# Patient Record
Sex: FEMALE | Race: WHITE | Employment: OTHER | URBAN - METROPOLITAN AREA
[De-identification: names, ages, dates, MRNs, and addresses within clinical notes are randomized per-mention and may not be internally consistent; named-entity substitution may affect disease eponyms.]

---

## 2017-08-23 PROBLEM — M54.2 NECK PAIN: Status: ACTIVE | Noted: 2017-08-23

## 2019-02-15 DIAGNOSIS — M54.2 NECK PAIN: Primary | ICD-10-CM

## 2019-03-01 ENCOUNTER — HOSPITAL ENCOUNTER (OUTPATIENT)
Dept: GENERAL RADIOLOGY | Age: 69
Discharge: HOME | End: 2019-03-03
Payer: MEDICARE

## 2019-03-01 ENCOUNTER — HOSPITAL ENCOUNTER (OUTPATIENT)
Age: 69
Discharge: HOME | End: 2019-03-03
Payer: MEDICARE

## 2019-03-01 ENCOUNTER — OFFICE VISIT (OUTPATIENT)
Dept: NEUROSURGERY | Age: 69
End: 2019-03-01
Payer: MEDICARE

## 2019-03-01 DIAGNOSIS — M54.2 NECK PAIN: Primary | ICD-10-CM

## 2019-03-01 DIAGNOSIS — M54.2 NECK PAIN: ICD-10-CM

## 2019-03-01 PROCEDURE — 1123F ACP DISCUSS/DSCN MKR DOCD: CPT | Performed by: PHYSICIAN ASSISTANT

## 2019-03-01 PROCEDURE — 1101F PT FALLS ASSESS-DOCD LE1/YR: CPT | Performed by: PHYSICIAN ASSISTANT

## 2019-03-01 PROCEDURE — G8428 CUR MEDS NOT DOCUMENT: HCPCS | Performed by: PHYSICIAN ASSISTANT

## 2019-03-01 PROCEDURE — 1036F TOBACCO NON-USER: CPT | Performed by: PHYSICIAN ASSISTANT

## 2019-03-01 PROCEDURE — G8484 FLU IMMUNIZE NO ADMIN: HCPCS | Performed by: PHYSICIAN ASSISTANT

## 2019-03-01 PROCEDURE — 72040 X-RAY EXAM NECK SPINE 2-3 VW: CPT

## 2019-03-01 PROCEDURE — G8421 BMI NOT CALCULATED: HCPCS | Performed by: PHYSICIAN ASSISTANT

## 2019-03-01 PROCEDURE — G8400 PT W/DXA NO RESULTS DOC: HCPCS | Performed by: PHYSICIAN ASSISTANT

## 2019-03-01 PROCEDURE — 99213 OFFICE O/P EST LOW 20 MIN: CPT | Performed by: PHYSICIAN ASSISTANT

## 2019-03-01 PROCEDURE — 1090F PRES/ABSN URINE INCON ASSESS: CPT | Performed by: PHYSICIAN ASSISTANT

## 2019-03-01 PROCEDURE — 3017F COLORECTAL CA SCREEN DOC REV: CPT | Performed by: PHYSICIAN ASSISTANT

## 2019-03-01 PROCEDURE — 4040F PNEUMOC VAC/ADMIN/RCVD: CPT | Performed by: PHYSICIAN ASSISTANT

## 2019-03-13 ENCOUNTER — HOSPITAL ENCOUNTER (OUTPATIENT)
Dept: MRI IMAGING | Age: 69
Discharge: HOME | End: 2019-03-15
Payer: MEDICARE

## 2019-03-13 DIAGNOSIS — M54.2 NECK PAIN: ICD-10-CM

## 2019-03-13 PROCEDURE — 72141 MRI NECK SPINE W/O DYE: CPT

## 2019-04-23 ENCOUNTER — OFFICE VISIT (OUTPATIENT)
Dept: NEUROSURGERY | Age: 69
End: 2019-04-23
Payer: MEDICARE

## 2019-04-23 VITALS
HEART RATE: 81 BPM | BODY MASS INDEX: 32.1 KG/M2 | DIASTOLIC BLOOD PRESSURE: 65 MMHG | SYSTOLIC BLOOD PRESSURE: 131 MMHG | WEIGHT: 188 LBS | HEIGHT: 64 IN

## 2019-04-23 DIAGNOSIS — M54.2 NECK PAIN: Primary | ICD-10-CM

## 2019-04-23 PROCEDURE — 99213 OFFICE O/P EST LOW 20 MIN: CPT | Performed by: NEUROLOGICAL SURGERY

## 2019-04-23 PROCEDURE — G8427 DOCREV CUR MEDS BY ELIG CLIN: HCPCS | Performed by: NEUROLOGICAL SURGERY

## 2019-04-23 PROCEDURE — 4040F PNEUMOC VAC/ADMIN/RCVD: CPT | Performed by: NEUROLOGICAL SURGERY

## 2019-04-23 PROCEDURE — 1090F PRES/ABSN URINE INCON ASSESS: CPT | Performed by: NEUROLOGICAL SURGERY

## 2019-04-23 PROCEDURE — G8417 CALC BMI ABV UP PARAM F/U: HCPCS | Performed by: NEUROLOGICAL SURGERY

## 2019-04-23 PROCEDURE — 1123F ACP DISCUSS/DSCN MKR DOCD: CPT | Performed by: NEUROLOGICAL SURGERY

## 2019-04-23 PROCEDURE — 3017F COLORECTAL CA SCREEN DOC REV: CPT | Performed by: NEUROLOGICAL SURGERY

## 2019-04-23 PROCEDURE — G8400 PT W/DXA NO RESULTS DOC: HCPCS | Performed by: NEUROLOGICAL SURGERY

## 2019-04-23 PROCEDURE — 1036F TOBACCO NON-USER: CPT | Performed by: NEUROLOGICAL SURGERY

## 2019-04-23 NOTE — PROGRESS NOTES
Patient is here for follow up consult for: neck pain. She has failed physical therapy and has myelopathy    Physical exam  Alert and Oriented X3  PERRLA, EOMI  ARAUJO 5/5  Sensation intact to LT and PP  Reflexes are 3+ and symmetric  Positive Lucia's sign bilaterally      A/P: patient is here for follow up for: pain. She has cervical stenosis from C3 to C6.  She has myelopathy with a positive Lucia's sign and I am recommending a posterior C3-C6 laminectomy and fusion    Marcelino Willa

## 2019-05-07 ENCOUNTER — PREP FOR PROCEDURE (OUTPATIENT)
Dept: NEUROSURGERY | Age: 69
End: 2019-05-07

## 2019-05-07 DIAGNOSIS — M48.02 CERVICAL STENOSIS OF SPINAL CANAL: Primary | ICD-10-CM

## 2019-05-07 RX ORDER — SODIUM CHLORIDE 0.9 % (FLUSH) 0.9 %
10 SYRINGE (ML) INJECTION EVERY 12 HOURS SCHEDULED
Status: CANCELLED | OUTPATIENT
Start: 2019-05-07

## 2019-05-07 RX ORDER — SODIUM CHLORIDE 0.9 % (FLUSH) 0.9 %
10 SYRINGE (ML) INJECTION PRN
Status: CANCELLED | OUTPATIENT
Start: 2019-05-07

## 2019-05-07 RX ORDER — SODIUM CHLORIDE 9 MG/ML
INJECTION, SOLUTION INTRAVENOUS CONTINUOUS
Status: CANCELLED | OUTPATIENT
Start: 2019-05-07

## 2019-06-14 RX ORDER — MULTIVIT-MIN/IRON/FOLIC ACID/K 18-600-40
CAPSULE ORAL
Status: ON HOLD | COMMUNITY
End: 2019-08-09 | Stop reason: HOSPADM

## 2019-06-14 NOTE — PROGRESS NOTES
pre-op area if you have concerns about your blood sugar [phone]. [x] Use your inhalers the morning of surgery. Bring your emergency inhaler with you day of surgery. [x] Follow physician instructions regarding any blood thinners you may be taking. WHAT TO EXPECT:  [x] The day of surgery you will be greeted and checked in by the [employer].  In addition, you will be registered in the Institute by a Patient Access Representative. Please bring your photo ID and insurance card. A nurse will greet you in accordance to the time you are needed in the pre-op area to prepare you for surgery. Please do not be discouraged if you are not greeted in the order you arrive as there are many variables that are involved in patient preparation. Your patience is greatly appreciated as you wait for your nurse. Please bring in items such as: books, magazines, newspapers, electronics, or any other items  to occupy your time in the waiting area. [x]  Delays may occur with surgery and staff will make a sincere effort to keep you informed of delays. If any delays occur with your procedure, we apologize ahead of time for your inconvenience as we recognize the value of your time.

## 2019-06-20 NOTE — PROGRESS NOTES
Unable to reach pt by phone to review surgical procedure/post-op expectations. Pre-op education materials provided by staff during PAT session and immediate questions to be answered at that time. VM left on pts pone with contact #. Will follow post-op.     Jaime Plascencia, RN, BSN  [facility]

## 2019-06-21 ENCOUNTER — HOSPITAL ENCOUNTER (OUTPATIENT)
Dept: GENERAL RADIOLOGY | Age: 69
Discharge: HOME | End: 2019-06-23
Payer: MEDICARE

## 2019-06-21 ENCOUNTER — HOSPITAL ENCOUNTER (OUTPATIENT)
Dept: PREADMISSION TESTING | Age: 69
Discharge: HOME | End: 2019-06-21
Payer: MEDICARE

## 2019-06-21 ENCOUNTER — ANESTHESIA EVENT (OUTPATIENT)
Dept: OPERATING ROOM | Age: 69
DRG: 473 | End: 2019-06-21
Payer: MEDICARE

## 2019-06-21 VITALS
RESPIRATION RATE: 20 BRPM | OXYGEN SATURATION: 98 % | HEIGHT: 65 IN | BODY MASS INDEX: 29.99 KG/M2 | TEMPERATURE: 98.2 F | HEART RATE: 73 BPM | WEIGHT: 180 LBS

## 2019-06-21 DIAGNOSIS — Z01.812 PRE-OPERATIVE LABORATORY EXAMINATION: ICD-10-CM

## 2019-06-21 DIAGNOSIS — M48.02 CERVICAL STENOSIS OF SPINAL CANAL: ICD-10-CM

## 2019-06-21 LAB
ABO/RH: NORMAL
ANION GAP SERPL CALCULATED.3IONS-SCNC: 9 MMOL/L (ref 7–16)
ANTIBODY SCREEN: NORMAL
BACTERIA: NORMAL /HPF
BASOPHILS ABSOLUTE: 0.07 E9/L (ref 0–0.2)
BASOPHILS RELATIVE PERCENT: 0.9 % (ref 0–2)
BILIRUBIN URINE: NEGATIVE
BLOOD, URINE: ABNORMAL
BUN BLDV-MCNC: 13 MG/DL (ref 8–23)
CALCIUM SERPL-MCNC: 9.4 MG/DL (ref 8.6–10.2)
CHLORIDE BLD-SCNC: 103 MMOL/L (ref 98–107)
CLARITY: CLEAR
CO2: 28 MMOL/L (ref 22–29)
COLOR: YELLOW
CREAT SERPL-MCNC: 0.6 MG/DL (ref 0.5–1)
EKG ATRIAL RATE: 73 BPM
EKG P AXIS: -20 DEGREES
EKG P-R INTERVAL: 146 MS
EKG Q-T INTERVAL: 386 MS
EKG QRS DURATION: 86 MS
EKG QTC CALCULATION (BAZETT): 425 MS
EKG R AXIS: 49 DEGREES
EKG T AXIS: 47 DEGREES
EKG VENTRICULAR RATE: 73 BPM
EOSINOPHILS ABSOLUTE: 0.35 E9/L (ref 0.05–0.5)
EOSINOPHILS RELATIVE PERCENT: 4.6 % (ref 0–6)
GFR AFRICAN AMERICAN: >60
GFR NON-AFRICAN AMERICAN: >60 ML/MIN/1.73
GLUCOSE BLD-MCNC: 120 MG/DL (ref 74–99)
GLUCOSE URINE: NEGATIVE MG/DL
HCT VFR BLD CALC: 41 % (ref 34–48)
HEMOGLOBIN: 13.3 G/DL (ref 11.5–15.5)
IMMATURE GRANULOCYTES #: 0.02 E9/L
IMMATURE GRANULOCYTES %: 0.3 % (ref 0–5)
INR BLD: 1
KETONES, URINE: NEGATIVE MG/DL
LEUKOCYTE ESTERASE, URINE: ABNORMAL
LYMPHOCYTES ABSOLUTE: 3.11 E9/L (ref 1.5–4)
LYMPHOCYTES RELATIVE PERCENT: 40.5 % (ref 20–42)
MCH RBC QN AUTO: 29.1 PG (ref 26–35)
MCHC RBC AUTO-ENTMCNC: 32.4 % (ref 32–34.5)
MCV RBC AUTO: 89.7 FL (ref 80–99.9)
MONOCYTES ABSOLUTE: 0.57 E9/L (ref 0.1–0.95)
MONOCYTES RELATIVE PERCENT: 7.4 % (ref 2–12)
NEUTROPHILS ABSOLUTE: 3.55 E9/L (ref 1.8–7.3)
NEUTROPHILS RELATIVE PERCENT: 46.3 % (ref 43–80)
NITRITE, URINE: NEGATIVE
PDW BLD-RTO: 13.6 FL (ref 11.5–15)
PH UA: 6 (ref 5–9)
PLATELET # BLD: 218 E9/L (ref 130–450)
PMV BLD AUTO: 10.1 FL (ref 7–12)
POTASSIUM REFLEX MAGNESIUM: 4.2 MMOL/L (ref 3.5–5)
PROTEIN UA: NEGATIVE MG/DL
PROTHROMBIN TIME: 11.2 SEC (ref 9.3–12.4)
RBC # BLD: 4.57 E12/L (ref 3.5–5.5)
RBC UA: NORMAL /HPF (ref 0–2)
RENAL EPITHELIAL, UA: NORMAL /HPF
SODIUM BLD-SCNC: 140 MMOL/L (ref 132–146)
SPECIFIC GRAVITY UA: 1.02 (ref 1–1.03)
UROBILINOGEN, URINE: 0.2 E.U./DL
WBC # BLD: 7.7 E9/L (ref 4.5–11.5)
WBC UA: NORMAL /HPF (ref 0–5)

## 2019-06-21 PROCEDURE — 86850 RBC ANTIBODY SCREEN: CPT

## 2019-06-21 PROCEDURE — 71046 X-RAY EXAM CHEST 2 VIEWS: CPT

## 2019-06-21 PROCEDURE — 87088 URINE BACTERIA CULTURE: CPT

## 2019-06-21 PROCEDURE — 36415 COLL VENOUS BLD VENIPUNCTURE: CPT

## 2019-06-21 PROCEDURE — 87081 CULTURE SCREEN ONLY: CPT

## 2019-06-21 PROCEDURE — 85025 COMPLETE CBC W/AUTO DIFF WBC: CPT

## 2019-06-21 PROCEDURE — 80048 BASIC METABOLIC PNL TOTAL CA: CPT

## 2019-06-21 PROCEDURE — 85610 PROTHROMBIN TIME: CPT

## 2019-06-21 PROCEDURE — 93010 ELECTROCARDIOGRAM REPORT: CPT | Performed by: INTERNAL MEDICINE

## 2019-06-21 PROCEDURE — 93005 ELECTROCARDIOGRAM TRACING: CPT | Performed by: PHYSICIAN ASSISTANT

## 2019-06-21 PROCEDURE — 86900 BLOOD TYPING SEROLOGIC ABO: CPT

## 2019-06-21 PROCEDURE — 81001 URINALYSIS AUTO W/SCOPE: CPT

## 2019-06-21 PROCEDURE — 86901 BLOOD TYPING SEROLOGIC RH(D): CPT

## 2019-06-21 ASSESSMENT — PAIN DESCRIPTION - LOCATION: LOCATION: SHOULDER;NECK

## 2019-06-21 ASSESSMENT — PAIN DESCRIPTION - FREQUENCY: FREQUENCY: CONTINUOUS

## 2019-06-21 ASSESSMENT — PAIN DESCRIPTION - PAIN TYPE: TYPE: CHRONIC PAIN

## 2019-06-21 ASSESSMENT — PAIN SCALES - GENERAL: PAINLEVEL_OUTOF10: 8

## 2019-06-21 ASSESSMENT — PAIN DESCRIPTION - ONSET: ONSET: ON-GOING

## 2019-06-21 ASSESSMENT — PAIN DESCRIPTION - PROGRESSION: CLINICAL_PROGRESSION: GRADUALLY WORSENING

## 2019-06-21 ASSESSMENT — PAIN DESCRIPTION - DESCRIPTORS: DESCRIPTORS: ACHING;SHARP;SHOOTING;NUMBNESS;TINGLING

## 2019-06-21 NOTE — ANESTHESIA PRE PROCEDURE
Department of Anesthesiology  Preprocedure Note       Name:  Payton Ellis   Age:  77 y.o.  :  1950                                          MRN:  [id number]         Date:  2019      Surgeon: Adeola Tovar):  Samantha Rodriguez MD    Procedure: POSTERIOR C3-C6 LAMINECTOMY AND FUSION -- NEEDS PLATES, SCREWS, CRISTÓBAL TABLE, CELL SAVER - GLOBUS (N/A )    Medications prior to admission:   Prior to Admission medications    Medication Sig Start Date End Date Taking? Authorizing Provider   Cholecalciferol (VITAMIN D) 2000 units CAPS capsule Take by mouth   Yes Historical Provider, MD   escitalopram (LEXAPRO) 10 MG tablet Take 20 mg by mouth daily    Yes Historical Provider, MD   amLODIPine (NORVASC) 5 MG tablet Take 5 mg by mouth daily   Yes Historical Provider, MD   Naproxen Sodium (ALEVE) 220 MG CAPS Take by mouth   Yes Historical Provider, MD   Multiple Vitamins-Minerals (THERAPEUTIC MULTIVITAMIN-MINERALS) tablet Take 1 tablet by mouth daily   Yes Historical Provider, MD   fluticasone (FLONASE) 50 MCG/ACT nasal spray 1 spray by Nasal route daily    Historical Provider, MD   vitamin B-12 (CYANOCOBALAMIN) 100 MCG tablet Take 50 mcg by mouth daily    Historical Provider, MD       Current medications:    Current Facility-Administered Medications   Medication Dose Route Frequency Provider Last Rate Last Dose    0.9 % sodium chloride infusion   Intravenous Continuous DRAKE Carmen        ceFAZolin (ANCEF) 2 g in dextrose 5 % 50 mL IVPB  2 g Intravenous [facility]        sodium chloride flush 0.9 % injection 10 mL  10 mL Intravenous 2 times per day DRAKE Carmen        sodium chloride flush 0.9 % injection 10 mL  10 mL Intravenous PRN DRAKE Carmen           Allergies:     Allergies   Allergen Reactions    Sulfa Antibiotics      As a child       Problem List:    Patient Active Problem List   Diagnosis Code    Neck pain M54.2    Cervical stenosis of spinal canal M48.02 Past Medical History:        Diagnosis Date    Anxiety     Depression     Osteoarthritis        Past Surgical History:        Procedure Laterality Date    CYST REMOVAL      ovary    JOINT REPLACEMENT      right hip    TONSILLECTOMY AND ADENOIDECTOMY      TOTAL HIP ARTHROPLASTY Right 2015       Social History:    Social History     Tobacco Use    Smoking status: Never Smoker    Smokeless tobacco: Never Used   Substance Use Topics    Alcohol use: No                                Counseling given: Not Answered      Vital Signs (Current):   Vitals:    06/14/19 1034 06/27/19 0639   BP:  (!) 170/78   Pulse:  80   Resp:  18   Temp:  98 °F (36.7 °C)   SpO2:  96%   Weight: 180 lb (81.6 kg) 186 lb (84.4 kg)   Height: 5' 4.5\" (1.638 m) 5' 4.5\" (1.638 m)                                              BP Readings from Last 3 Encounters:   06/27/19 (!) 170/78   04/23/19 131/65   01/23/18 (!) 179/85       NPO Status: Time of last liquid consumption: 2330pt instructed NPO after midnight 6/27/2019                         Time of last solid consumption: 2330                        Date of last liquid consumption: 06/26/19                        Date of last solid food consumption: 06/26/19    BMI:   Wt Readings from Last 3 Encounters:   06/27/19 186 lb (84.4 kg)   06/21/19 180 lb (81.6 kg)   04/23/19 188 lb (85.3 kg)     Body mass index is 31.43 kg/m².     CBC:   Lab Results   Component Value Date    WBC 7.7 06/21/2019    RBC 4.57 06/21/2019    HGB 13.3 06/21/2019    HCT 41.0 06/21/2019    MCV 89.7 06/21/2019    RDW 13.6 06/21/2019     06/21/2019       CMP:   Lab Results   Component Value Date     06/21/2019    K 4.2 06/21/2019     06/21/2019    CO2 28 06/21/2019    BUN 13 06/21/2019    CREATININE 0.6 06/21/2019    GFRAA >60 06/21/2019    LABGLOM >60 06/21/2019    GLUCOSE 120 06/21/2019    PROT 5.8 04/28/2015    CALCIUM 9.4 06/21/2019    BILITOT 0.3 04/28/2015    ALKPHOS 76 04/28/2015    AST 26 04/28/2015    ALT 22 04/28/2015       POC Tests: No results for input(s): POCGLU, POCNA, POCK, POCCL, POCBUN, POCHEMO, POCHCT in the last 72 hours. Coags:   Lab Results   Component Value Date    PROTIME 11.2 06/21/2019    INR 1.0 06/21/2019       HCG (If Applicable): No results found for: PREGTESTUR, PREGSERUM, HCG, HCGQUANT     ABGs: No results found for: PHART, PO2ART, SRY7VNB, GFV2SBC, BEART, D1ASOJDZ     Type & Screen (If Applicable):  No results found for: Paralee Lukes [address]     6/21/2019 EKG  EKG 12 lead   Study Date: 06/21/2019   [place] Perking 77 y.o.     Ordering Provider DRAKE Villarreal   Indications Cervical stenosis of spinal canal [M48.02 (ICD-10-CM)]   Result Information     Status: Preliminary result (6/21/2019 08:58) Provider Status: Ordered   Routing History     Priority Sent On From To Message Type    6/21/2019  8:38 AM Sal[place], Mhy Incoming Results From [employer] [facility] Results    6/21/2019  8:38 AM Salinas, Mhy Incoming Results From [employer] [facility] Pool Results   Order Questions     Question Answer Comment   Reason for Exam? Pre-op           PACS Images      Show images for EKG 12 lead   6/21/2019  8:58 AM - Salinas, Mhy Incoming Ekg Results From [facility]     Component Value Ref Range & Units Status Collected Lab   Ventricular Rate 73  BPM Preliminary 06/21/2019  8:53 AM [facility]   Atrial Rate 73  BPM Preliminary 06/21/2019  8:53 AM [facility]   P-R Interval 146  ms Preliminary 06/21/2019  8:53 AM [facility]   QRS Duration 86  ms Preliminary 06/21/2019  8:53 AM [facility]   Q-T Interval 386  ms Preliminary 06/21/2019  8:53 AM [facility]   QTc Calculation (Bazett) 425  ms Preliminary 06/21/2019  8:53 AM [facility]   P Axis -20  degrees Preliminary 06/21/2019  8:53 AM [facility]   R [place] 49  degrees Preliminary 06/21/2019  8:53 AM [facility]   T [place] 47  degrees Preliminary 06/21/2019  8:53 AM [facility]   Testing Performed By     Lab - Abbreviation Name Director Address Valid Date Range   360-[facility] MUSE Unknown Unknown 04/18/16 0721-Present   Narrative   Performed by: [facility]   Normal sinus rhythm  Normal ECG     3/1/2019 XR cervical spine     Mild grade 1 anterolisthesis C3 on C4. There is a well-corticated bony fragment overlying the inferior   endplate of C3 which may represent a remote avulsion fracture of the   osteophyte. Multiple osteophytes are seen along the lower cervical spine. No fracture or foreign body is identified. The disc spaces demonstrate moderate joint space loss primarily at   C1-C2 and C4-C5. Remainder of the cervical spine demonstrates mild   joint space loss    There is mild loss of the vertebral body height. There are areas of lucency noted through the bodies of C4-C5 and C6   which likely represent a lucency between contiguous proliferative   osteophytes at the uncinate process/vertebral articulation   The are severe degenerative changes involving the facets.           Impression   Findings consistent with moderate degenerative disc   disease and degenerative facets disease       The exam has been dictated and signed by Jimena Medel PA-C. Deborah Jacobs MD, Radiologist, have reviewed the images and   report and concur with these findings. 3/13/2019 MRI cervical spine     Findings: No evidence of fracture or subluxation. No suspicious marrow   infiltrative or destructive process is identified. Scattered fatty   marrow replacement changes are noted, some abutting the end plates. Considerable subcutaneous endplate sclerosis is seen about the   narrowed C4-C5 interspace.       Craniocervical junction remains in anatomic alignment. Mild   osteoarthritic changes are noted at the C1-C2 articulation.       At the C2-C3 interspace level, disc space height and configuration are   maintained. Spinal canal remains widely patent.  Posterior elements are   unremarkable for the presence of moderate left-sided only facet   hypertrophy which causes moderate foraminal outlet Cardiovascular:    (+) hypertension:,         Rhythm: regular  Rate: normal           Beta Blocker:  Not on Beta Blocker         Neuro/Psych:   (+) psychiatric history: stable with treatmentdepression/anxiety  (anxiety )             ROS comment: OA of cervical spine  GI/Hepatic/Renal: Neg GI/Hepatic/Renal ROS            Endo/Other:    (+) : arthritis: OA., .                 Abdominal:   (+) obese,         Vascular: negative vascular ROS. Anesthesia Plan      general     ASA 2     (IV to be placed in preop )  Induction: intravenous. BIS and arterial line  MIPS: Postoperative opioids intended, Prophylactic antiemetics administered and Postoperative trial extubation. Anesthetic plan and risks discussed with patient. Use of blood products discussed with patient whom consented to blood products. Plan discussed with CRNA and attending. Magalie Figueroa DO   2019        Department of Anesthesiology  Preprocedure Note       Name:  Mahad Lawrence   Age:  77 y.o.  :  1950                                          MRN:  [id number]         Date:  2019      Surgeon: Rosalie Chacko):  Isatu Rojas MD    Procedure: POSTERIOR C3-C6 LAMINECTOMY AND FUSION -- NEEDS PLATES, SCREWS, CRISTÓBAL TABLE, CELL SAVER - GLOBUS (N/A )    Medications prior to admission:   Prior to Admission medications    Medication Sig Start Date End Date Taking?  Authorizing Provider   Cholecalciferol (VITAMIN D) 2000 units CAPS capsule Take by mouth   Yes Historical Provider, MD   escitalopram (LEXAPRO) 10 MG tablet Take 20 mg by mouth daily    Yes Historical Provider, MD   amLODIPine (NORVASC) 5 MG tablet Take 5 mg by mouth daily   Yes Historical Provider, MD   Naproxen Sodium (ALEVE) 220 MG CAPS Take by mouth   Yes Historical Provider, MD   Multiple Vitamins-Minerals (THERAPEUTIC MULTIVITAMIN-MINERALS) tablet Take 1 tablet by mouth daily   Yes Historical Provider, MD   fluticasone (FLONASE) 50 MCG/ACT nasal spray 1 spray by Nasal route daily    Historical Provider, MD   vitamin B-12 (CYANOCOBALAMIN) 100 MCG tablet Take 50 mcg by mouth daily    Historical Provider, MD       Current medications:    Current Facility-Administered Medications   Medication Dose Route Frequency Provider Last Rate Last Dose    0.9 % sodium chloride infusion   Intravenous Continuous DRAKE Hunter        ceFAZolin (ANCEF) 2 g in dextrose 5 % 50 mL IVPB  2 g Intravenous On Call to Rachelle Taylor, PA        sodium chloride flush 0.9 % injection 10 mL  10 mL Intravenous 2 times per day DRAKE Hunter        sodium chloride flush 0.9 % injection 10 mL  10 mL Intravenous PRN DRAKE Hunter           Allergies: Allergies   Allergen Reactions    Sulfa Antibiotics      As a child       Problem List:    Patient Active Problem List   Diagnosis Code    Neck pain M54.2    Cervical stenosis of spinal canal M48.02       Past Medical History:        Diagnosis Date    Anxiety     Depression     Osteoarthritis        Past Surgical History:        Procedure Laterality Date    CYST REMOVAL      ovary    JOINT REPLACEMENT      right hip    TONSILLECTOMY AND ADENOIDECTOMY      TOTAL HIP ARTHROPLASTY Right 2015       Social History:    Social History     Tobacco Use    Smoking status: Never Smoker    Smokeless tobacco: Never Used   Substance Use Topics    Alcohol use:  No                                Counseling given: Not Answered      Vital Signs (Current):   Vitals:    06/14/19 1034 06/27/19 0639   BP:  (!) 170/78   Pulse:  80   Resp:  18   Temp:  98 °F (36.7 °C)   SpO2:  96%   Weight: 180 lb (81.6 kg) 186 lb (84.4 kg)   Height: 5' 4.5\" (1.638 m) 5' 4.5\" (1.638 m)                                              BP Readings from Last 3 Encounters:   06/27/19 (!) 170/78   04/23/19 131/65   01/23/18 (!) 179/85       NPO Status: Time of last liquid consumption: 2330pt [employer] P Josph Puls Ib Pool Results   Order Questions     Question Answer Comment   Reason for Exam? Pre-op           PACS Images      Show images for EKG 12 lead   6/21/2019  8:58 AM - Salinas, Mhy Incoming Ekg Results From [facility]     Component Value Ref Range & Units Status Collected Lab   Ventricular Rate 73  BPM Preliminary 06/21/2019  8:53 AM [facility]   Atrial Rate 73  BPM Preliminary 06/21/2019  8:53 AM [facility]   P-R Interval 146  ms Preliminary 06/21/2019  8:53 AM [facility]   QRS Duration 86  ms Preliminary 06/21/2019  8:53 AM [facility]   Q-T Interval 386  ms Preliminary 06/21/2019  8:53 AM [facility]   QTc Calculation (Bazett) 425  ms Preliminary 06/21/2019  8:53 AM [facility]   P Axis -20  degrees Preliminary 06/21/2019  8:53 AM [facility]   R [place] 49  degrees Preliminary 06/21/2019  8:53 AM [facility]   T [place] 47  degrees Preliminary 06/21/2019  8:53 AM [facility]   Testing Performed By     Lab - Abbreviation Name Director Address Valid Date Range   360-[facility] MUSE Unknown Unknown 04/18/16 0721-Present   Narrative   Performed by: [facility]   Normal sinus rhythm  Normal ECG     3/1/2019 XR cervical spine     Mild grade 1 anterolisthesis C3 on C4. There is a well-corticated bony fragment overlying the inferior   endplate of C3 which may represent a remote avulsion fracture of the   osteophyte. Multiple osteophytes are seen along the lower cervical spine. No fracture or foreign body is identified. The disc spaces demonstrate moderate joint space loss primarily at   C1-C2 and C4-C5. Remainder of the cervical spine demonstrates mild   joint space loss    There is mild loss of the vertebral body height.    There are areas of lucency noted through the bodies of C4-C5 and C6   which likely represent a lucency between contiguous proliferative   osteophytes at the uncinate process/vertebral articulation   The are severe degenerative changes involving the facets.           Impression   Findings consistent with moderate degenerative disc   disease and degenerative facets disease       The exam has been dictated and signed by Hermann Jackson PA-C. Haley Mtz MD, Radiologist, have reviewed the images and   report and concur with these findings. 3/13/2019 MRI cervical spine     Findings: No evidence of fracture or subluxation. No suspicious marrow   infiltrative or destructive process is identified. Scattered fatty   marrow replacement changes are noted, some abutting the end plates. Considerable subcutaneous endplate sclerosis is seen about the   narrowed C4-C5 interspace.       Craniocervical junction remains in anatomic alignment. Mild   osteoarthritic changes are noted at the C1-C2 articulation.       At the C2-C3 interspace level, disc space height and configuration are   maintained. Spinal canal remains widely patent. Posterior elements are   unremarkable for the presence of moderate left-sided only facet   hypertrophy which causes moderate foraminal outlet narrowing.       At the C3-C4 interspace level minimal generalized posterior disc   bulging ridging changes are seen. Minimal decrease space height is   seen. Spinal canal is slightly compromised. Moderate right-sided   uncinate joint hypertrophy is noted on the left side, combination of   changes of moderate uncinate joint hypertrophy and mild facet   hypertrophy are noted. There is moderate narrowing of the right   foraminal outlet and severe narrowing of the left foramen.       At the C4-C5 interspace level, severe disc space narrowing is noted   along with mild generalized posterior ridging. Mild to moderate   compromise the spinal canal is noted.  Moderate bilateral facet   hypertrophy is noted on the right and moderate to severe hypertrophy   on the left, causing extensive bilateral foraminal outlet narrowing.       At the C5-C6 interspace level, mild bilateral uncinate joint   hypertrophy is seen with mild generalized posterior disc bulging and ridging. There is moderate concurrent high pressure the right facet. There is mild compromise of the spinal canal. The right foramen is   severely narrowed of the left foraminal outlet mildly narrowed.       At the C6-C7 and C7-T1 interspace levels, only minor degenerative   changes are noted. Spinal canal and neural foramina remain widely   patent.       Cervical spinal cord remains normal in caliber. Minimal signal   abnormality compatible with myelinolysis is noted within the cord at   the approximate level of the C5-C6 interspace.       No paraspinous mass lesion is identified.           Impression   Extensive multilevel cervical spondylotic changes as described above.             Anesthesia Evaluation  Patient summary reviewed and Nursing notes reviewed no history of anesthetic complications:   Airway: Mallampati: II  TM distance: >3 FB   Neck ROM: full  Mouth opening: > = 3 FB Dental:          Pulmonary:Negative Pulmonary ROS and normal exam                               Cardiovascular:    (+) hypertension:,         Rhythm: regular  Rate: normal           Beta Blocker:  Not on Beta Blocker         Neuro/Psych:   (+) psychiatric history: stable with treatmentdepression/anxiety  (anxiety )             ROS comment: OA of cervical spine  GI/Hepatic/Renal: Neg GI/Hepatic/Renal ROS            Endo/Other:    (+) : arthritis: OA., .                 Abdominal:           Vascular: negative vascular ROS. Anesthesia Plan      general     ASA 2     (IV to be placed in preop )  Induction: intravenous. BIS and arterial line  MIPS: Postoperative opioids intended, Prophylactic antiemetics administered and Postoperative trial extubation. Anesthetic plan and risks discussed with patient. Use of blood products discussed with patient whom consented to blood products. Plan discussed with CRNA and attending.                   Lynna Sandifer, DO   6/27/2019

## 2019-06-22 LAB — MRSA CULTURE ONLY: NORMAL

## 2019-06-23 LAB — URINE CULTURE, ROUTINE: NORMAL

## 2019-06-27 ENCOUNTER — ANESTHESIA (OUTPATIENT)
Dept: OPERATING ROOM | Age: 69
DRG: 473 | End: 2019-06-27
Payer: MEDICARE

## 2019-06-27 ENCOUNTER — APPOINTMENT (OUTPATIENT)
Dept: GENERAL RADIOLOGY | Age: 69
DRG: 473 | End: 2019-06-27
Attending: NEUROLOGICAL SURGERY
Payer: MEDICARE

## 2019-06-27 ENCOUNTER — HOSPITAL ENCOUNTER (INPATIENT)
Age: 69
LOS: 2 days | Discharge: NURSING, REHAB OR LONG-TERM CARE | DRG: 473 | End: 2019-06-29
Attending: NEUROLOGICAL SURGERY | Admitting: NEUROLOGICAL SURGERY
Payer: MEDICARE

## 2019-06-27 VITALS
OXYGEN SATURATION: 100 % | DIASTOLIC BLOOD PRESSURE: 71 MMHG | SYSTOLIC BLOOD PRESSURE: 129 MMHG | RESPIRATION RATE: 9 BRPM

## 2019-06-27 DIAGNOSIS — M48.02 CERVICAL STENOSIS OF SPINAL CANAL: ICD-10-CM

## 2019-06-27 DIAGNOSIS — Z01.812 PRE-OPERATIVE LABORATORY EXAMINATION: Primary | ICD-10-CM

## 2019-06-27 PROCEDURE — C1713 ANCHOR/SCREW BN/BN,TIS/BN: HCPCS | Performed by: NEUROLOGICAL SURGERY

## 2019-06-27 PROCEDURE — 22600 ARTHRD PST TQ 1NTRSPC CRV: CPT | Performed by: PHYSICIAN ASSISTANT

## 2019-06-27 PROCEDURE — 2580000003 HC RX 258

## 2019-06-27 PROCEDURE — 22842 INSERT SPINE FIXATION DEVICE: CPT | Performed by: NEUROLOGICAL SURGERY

## 2019-06-27 PROCEDURE — 2580000003 HC RX 258: Performed by: NEUROLOGICAL SURGERY

## 2019-06-27 PROCEDURE — 7100000001 HC PACU RECOVERY - ADDTL 15 MIN: Performed by: NEUROLOGICAL SURGERY

## 2019-06-27 PROCEDURE — 22614 ARTHRD PST TQ 1NTRSPC EA ADD: CPT | Performed by: PHYSICIAN ASSISTANT

## 2019-06-27 PROCEDURE — 2500000003 HC RX 250 WO HCPCS: Performed by: NEUROLOGICAL SURGERY

## 2019-06-27 PROCEDURE — 6360000002 HC RX W HCPCS

## 2019-06-27 PROCEDURE — 97162 PT EVAL MOD COMPLEX 30 MIN: CPT

## 2019-06-27 PROCEDURE — 97165 OT EVAL LOW COMPLEX 30 MIN: CPT

## 2019-06-27 PROCEDURE — 6370000000 HC RX 637 (ALT 250 FOR IP): Performed by: NEUROLOGICAL SURGERY

## 2019-06-27 PROCEDURE — 3700000000 HC ANESTHESIA ATTENDED CARE: Performed by: NEUROLOGICAL SURGERY

## 2019-06-27 PROCEDURE — 3700000001 HC ADD 15 MINUTES (ANESTHESIA): Performed by: NEUROLOGICAL SURGERY

## 2019-06-27 PROCEDURE — 1200000000 HC SEMI PRIVATE

## 2019-06-27 PROCEDURE — 6360000002 HC RX W HCPCS: Performed by: NEUROLOGICAL SURGERY

## 2019-06-27 PROCEDURE — 63015 REMOVE SPINE LAMINA >2 CRVCL: CPT | Performed by: PHYSICIAN ASSISTANT

## 2019-06-27 PROCEDURE — 7100000000 HC PACU RECOVERY - FIRST 15 MIN: Performed by: NEUROLOGICAL SURGERY

## 2019-06-27 PROCEDURE — 6360000002 HC RX W HCPCS: Performed by: ANESTHESIOLOGY

## 2019-06-27 PROCEDURE — 22842 INSERT SPINE FIXATION DEVICE: CPT | Performed by: PHYSICIAN ASSISTANT

## 2019-06-27 PROCEDURE — 3600000014 HC SURGERY LEVEL 4 ADDTL 15MIN: Performed by: NEUROLOGICAL SURGERY

## 2019-06-27 PROCEDURE — 2700000000 HC OXYGEN THERAPY PER DAY

## 2019-06-27 PROCEDURE — 97530 THERAPEUTIC ACTIVITIES: CPT

## 2019-06-27 PROCEDURE — 0RG2071 FUSION OF 2 OR MORE CERVICAL VERTEBRAL JOINTS WITH AUTOLOGOUS TISSUE SUBSTITUTE, POSTERIOR APPROACH, POSTERIOR COLUMN, OPEN APPROACH: ICD-10-PCS | Performed by: NEUROLOGICAL SURGERY

## 2019-06-27 PROCEDURE — 22614 ARTHRD PST TQ 1NTRSPC EA ADD: CPT | Performed by: NEUROLOGICAL SURGERY

## 2019-06-27 PROCEDURE — 63015 REMOVE SPINE LAMINA >2 CRVCL: CPT | Performed by: NEUROLOGICAL SURGERY

## 2019-06-27 PROCEDURE — 2709999900 HC NON-CHARGEABLE SUPPLY: Performed by: NEUROLOGICAL SURGERY

## 2019-06-27 PROCEDURE — 6360000002 HC RX W HCPCS: Performed by: PHYSICIAN ASSISTANT

## 2019-06-27 PROCEDURE — 22600 ARTHRD PST TQ 1NTRSPC CRV: CPT | Performed by: NEUROLOGICAL SURGERY

## 2019-06-27 PROCEDURE — 2500000003 HC RX 250 WO HCPCS

## 2019-06-27 PROCEDURE — 3209999900 FLUORO FOR SURGICAL PROCEDURES

## 2019-06-27 PROCEDURE — 3600000004 HC SURGERY LEVEL 4 BASE: Performed by: NEUROLOGICAL SURGERY

## 2019-06-27 PROCEDURE — C9359 IMPLNT,BON VOID FILLER-PUTTY: HCPCS | Performed by: NEUROLOGICAL SURGERY

## 2019-06-27 DEVICE — 3.5MM CAPITOL CURVED ROD, 50MM
Type: IMPLANTABLE DEVICE | Site: SPINE CERVICAL | Status: FUNCTIONAL
Brand: CAPITOL

## 2019-06-27 DEVICE — ALLOGRAFT BNE PTTY 5 CC DBM PROGENIX +: Type: IMPLANTABLE DEVICE | Site: SPINE CERVICAL | Status: FUNCTIONAL

## 2019-06-27 DEVICE — QUARTEX THREADED LOCKING CAP
Type: IMPLANTABLE DEVICE | Site: SPINE CERVICAL | Status: FUNCTIONAL
Brand: QUARTEX

## 2019-06-27 DEVICE — QUARTEX 3.5MM POLYAXIAL SCREW, 14MM
Type: IMPLANTABLE DEVICE | Site: SPINE CERVICAL | Status: FUNCTIONAL
Brand: QUARTEX

## 2019-06-27 RX ORDER — SODIUM CHLORIDE 9 MG/ML
INJECTION, SOLUTION INTRAVENOUS CONTINUOUS PRN
Status: DISCONTINUED | OUTPATIENT
Start: 2019-06-27 | End: 2019-06-27 | Stop reason: SDUPTHER

## 2019-06-27 RX ORDER — CYCLOBENZAPRINE HCL 10 MG
10 TABLET ORAL 3 TIMES DAILY PRN
Status: DISCONTINUED | OUTPATIENT
Start: 2019-06-27 | End: 2019-06-29 | Stop reason: HOSPADM

## 2019-06-27 RX ORDER — OXYCODONE HYDROCHLORIDE 5 MG/1
5 TABLET ORAL EVERY 4 HOURS PRN
Status: DISCONTINUED | OUTPATIENT
Start: 2019-06-27 | End: 2019-06-29 | Stop reason: HOSPADM

## 2019-06-27 RX ORDER — LIDOCAINE HYDROCHLORIDE 20 MG/ML
INJECTION, SOLUTION INTRAVENOUS PRN
Status: DISCONTINUED | OUTPATIENT
Start: 2019-06-27 | End: 2019-06-27 | Stop reason: SDUPTHER

## 2019-06-27 RX ORDER — ONDANSETRON 2 MG/ML
4 INJECTION INTRAMUSCULAR; INTRAVENOUS EVERY 6 HOURS PRN
Status: DISCONTINUED | OUTPATIENT
Start: 2019-06-27 | End: 2019-06-29 | Stop reason: HOSPADM

## 2019-06-27 RX ORDER — OXYCODONE HYDROCHLORIDE AND ACETAMINOPHEN 5; 325 MG/1; MG/1
1 TABLET ORAL PRN
Status: DISCONTINUED | OUTPATIENT
Start: 2019-06-27 | End: 2019-06-27 | Stop reason: HOSPADM

## 2019-06-27 RX ORDER — SENNA AND DOCUSATE SODIUM 50; 8.6 MG/1; MG/1
1 TABLET, FILM COATED ORAL 2 TIMES DAILY
Status: DISCONTINUED | OUTPATIENT
Start: 2019-06-27 | End: 2019-06-29 | Stop reason: HOSPADM

## 2019-06-27 RX ORDER — VANCOMYCIN HYDROCHLORIDE 500 MG/10ML
INJECTION, POWDER, LYOPHILIZED, FOR SOLUTION INTRAVENOUS PRN
Status: DISCONTINUED | OUTPATIENT
Start: 2019-06-27 | End: 2019-06-27 | Stop reason: ALTCHOICE

## 2019-06-27 RX ORDER — NEOSTIGMINE METHYLSULFATE 1 MG/ML
INJECTION, SOLUTION INTRAVENOUS PRN
Status: DISCONTINUED | OUTPATIENT
Start: 2019-06-27 | End: 2019-06-27 | Stop reason: SDUPTHER

## 2019-06-27 RX ORDER — MORPHINE SULFATE 4 MG/ML
4 INJECTION, SOLUTION INTRAMUSCULAR; INTRAVENOUS
Status: DISCONTINUED | OUTPATIENT
Start: 2019-06-27 | End: 2019-06-29 | Stop reason: HOSPADM

## 2019-06-27 RX ORDER — SODIUM CHLORIDE, SODIUM LACTATE, POTASSIUM CHLORIDE, CALCIUM CHLORIDE 600; 310; 30; 20 MG/100ML; MG/100ML; MG/100ML; MG/100ML
INJECTION, SOLUTION INTRAVENOUS CONTINUOUS PRN
Status: DISCONTINUED | OUTPATIENT
Start: 2019-06-27 | End: 2019-06-27 | Stop reason: SDUPTHER

## 2019-06-27 RX ORDER — ESCITALOPRAM OXALATE 10 MG/1
20 TABLET ORAL DAILY
Status: DISCONTINUED | OUTPATIENT
Start: 2019-06-28 | End: 2019-06-29 | Stop reason: HOSPADM

## 2019-06-27 RX ORDER — ONDANSETRON 2 MG/ML
INJECTION INTRAMUSCULAR; INTRAVENOUS PRN
Status: DISCONTINUED | OUTPATIENT
Start: 2019-06-27 | End: 2019-06-27 | Stop reason: SDUPTHER

## 2019-06-27 RX ORDER — ONDANSETRON 2 MG/ML
4 INJECTION INTRAMUSCULAR; INTRAVENOUS
Status: DISCONTINUED | OUTPATIENT
Start: 2019-06-27 | End: 2019-06-27 | Stop reason: HOSPADM

## 2019-06-27 RX ORDER — SODIUM CHLORIDE 0.9 % (FLUSH) 0.9 %
10 SYRINGE (ML) INJECTION PRN
Status: DISCONTINUED | OUTPATIENT
Start: 2019-06-27 | End: 2019-06-29 | Stop reason: HOSPADM

## 2019-06-27 RX ORDER — BUPIVACAINE HYDROCHLORIDE AND EPINEPHRINE 2.5; 5 MG/ML; UG/ML
INJECTION, SOLUTION EPIDURAL; INFILTRATION; INTRACAUDAL; PERINEURAL PRN
Status: DISCONTINUED | OUTPATIENT
Start: 2019-06-27 | End: 2019-06-27 | Stop reason: ALTCHOICE

## 2019-06-27 RX ORDER — FENTANYL CITRATE 50 UG/ML
INJECTION, SOLUTION INTRAMUSCULAR; INTRAVENOUS PRN
Status: DISCONTINUED | OUTPATIENT
Start: 2019-06-27 | End: 2019-06-27 | Stop reason: SDUPTHER

## 2019-06-27 RX ORDER — SODIUM PHOSPHATE, DIBASIC AND SODIUM PHOSPHATE, MONOBASIC 7; 19 G/133ML; G/133ML
1 ENEMA RECTAL DAILY PRN
Status: DISCONTINUED | OUTPATIENT
Start: 2019-06-27 | End: 2019-06-29 | Stop reason: HOSPADM

## 2019-06-27 RX ORDER — ROCURONIUM BROMIDE 10 MG/ML
INJECTION, SOLUTION INTRAVENOUS PRN
Status: DISCONTINUED | OUTPATIENT
Start: 2019-06-27 | End: 2019-06-27 | Stop reason: SDUPTHER

## 2019-06-27 RX ORDER — SODIUM CHLORIDE 0.9 % (FLUSH) 0.9 %
10 SYRINGE (ML) INJECTION EVERY 12 HOURS SCHEDULED
Status: DISCONTINUED | OUTPATIENT
Start: 2019-06-27 | End: 2019-06-29 | Stop reason: HOSPADM

## 2019-06-27 RX ORDER — SODIUM CHLORIDE 9 MG/ML
INJECTION, SOLUTION INTRAVENOUS CONTINUOUS
Status: DISCONTINUED | OUTPATIENT
Start: 2019-06-27 | End: 2019-06-27

## 2019-06-27 RX ORDER — MEPERIDINE HYDROCHLORIDE 50 MG/ML
12.5 INJECTION INTRAMUSCULAR; INTRAVENOUS; SUBCUTANEOUS
Status: DISCONTINUED | OUTPATIENT
Start: 2019-06-27 | End: 2019-06-27 | Stop reason: HOSPADM

## 2019-06-27 RX ORDER — MORPHINE SULFATE 2 MG/ML
1 INJECTION, SOLUTION INTRAMUSCULAR; INTRAVENOUS EVERY 5 MIN PRN
Status: DISCONTINUED | OUTPATIENT
Start: 2019-06-27 | End: 2019-06-27 | Stop reason: HOSPADM

## 2019-06-27 RX ORDER — MORPHINE SULFATE 2 MG/ML
2 INJECTION, SOLUTION INTRAMUSCULAR; INTRAVENOUS
Status: DISCONTINUED | OUTPATIENT
Start: 2019-06-27 | End: 2019-06-29 | Stop reason: HOSPADM

## 2019-06-27 RX ORDER — MORPHINE SULFATE 2 MG/ML
2 INJECTION, SOLUTION INTRAMUSCULAR; INTRAVENOUS EVERY 5 MIN PRN
Status: DISCONTINUED | OUTPATIENT
Start: 2019-06-27 | End: 2019-06-27 | Stop reason: HOSPADM

## 2019-06-27 RX ORDER — FLUTICASONE PROPIONATE 50 MCG
1 SPRAY, SUSPENSION (ML) NASAL DAILY
Status: DISCONTINUED | OUTPATIENT
Start: 2019-06-27 | End: 2019-06-29 | Stop reason: HOSPADM

## 2019-06-27 RX ORDER — PROPOFOL 10 MG/ML
INJECTION, EMULSION INTRAVENOUS PRN
Status: DISCONTINUED | OUTPATIENT
Start: 2019-06-27 | End: 2019-06-27 | Stop reason: SDUPTHER

## 2019-06-27 RX ORDER — SODIUM CHLORIDE 0.9 % (FLUSH) 0.9 %
10 SYRINGE (ML) INJECTION PRN
Status: DISCONTINUED | OUTPATIENT
Start: 2019-06-27 | End: 2019-06-27 | Stop reason: HOSPADM

## 2019-06-27 RX ORDER — AMLODIPINE BESYLATE 5 MG/1
5 TABLET ORAL DAILY
Status: DISCONTINUED | OUTPATIENT
Start: 2019-06-28 | End: 2019-06-29 | Stop reason: HOSPADM

## 2019-06-27 RX ORDER — DOCUSATE SODIUM 100 MG/1
100 CAPSULE, LIQUID FILLED ORAL 2 TIMES DAILY
Status: DISCONTINUED | OUTPATIENT
Start: 2019-06-27 | End: 2019-06-29 | Stop reason: HOSPADM

## 2019-06-27 RX ORDER — GLYCOPYRROLATE 1 MG/5 ML
SYRINGE (ML) INTRAVENOUS PRN
Status: DISCONTINUED | OUTPATIENT
Start: 2019-06-27 | End: 2019-06-27 | Stop reason: SDUPTHER

## 2019-06-27 RX ORDER — DEXAMETHASONE SODIUM PHOSPHATE 10 MG/ML
INJECTION INTRAMUSCULAR; INTRAVENOUS PRN
Status: DISCONTINUED | OUTPATIENT
Start: 2019-06-27 | End: 2019-06-27 | Stop reason: SDUPTHER

## 2019-06-27 RX ORDER — PHENYLEPHRINE HYDROCHLORIDE 10 MG/ML
INJECTION INTRAVENOUS PRN
Status: DISCONTINUED | OUTPATIENT
Start: 2019-06-27 | End: 2019-06-27 | Stop reason: SDUPTHER

## 2019-06-27 RX ORDER — MIDAZOLAM HYDROCHLORIDE 1 MG/ML
INJECTION INTRAMUSCULAR; INTRAVENOUS PRN
Status: DISCONTINUED | OUTPATIENT
Start: 2019-06-27 | End: 2019-06-27 | Stop reason: SDUPTHER

## 2019-06-27 RX ORDER — M-VIT,TX,IRON,MINS/CALC/FOLIC 27MG-0.4MG
1 TABLET ORAL DAILY
Status: DISCONTINUED | OUTPATIENT
Start: 2019-06-27 | End: 2019-06-29 | Stop reason: HOSPADM

## 2019-06-27 RX ORDER — OXYCODONE HYDROCHLORIDE AND ACETAMINOPHEN 5; 325 MG/1; MG/1
2 TABLET ORAL PRN
Status: DISCONTINUED | OUTPATIENT
Start: 2019-06-27 | End: 2019-06-27 | Stop reason: HOSPADM

## 2019-06-27 RX ORDER — LIDOCAINE HYDROCHLORIDE AND EPINEPHRINE 5; 5 MG/ML; UG/ML
INJECTION, SOLUTION INFILTRATION; PERINEURAL PRN
Status: DISCONTINUED | OUTPATIENT
Start: 2019-06-27 | End: 2019-06-27 | Stop reason: ALTCHOICE

## 2019-06-27 RX ORDER — OXYCODONE HYDROCHLORIDE 10 MG/1
10 TABLET ORAL EVERY 4 HOURS PRN
Status: DISCONTINUED | OUTPATIENT
Start: 2019-06-27 | End: 2019-06-29 | Stop reason: HOSPADM

## 2019-06-27 RX ORDER — SODIUM CHLORIDE 0.9 % (FLUSH) 0.9 %
10 SYRINGE (ML) INJECTION EVERY 12 HOURS SCHEDULED
Status: DISCONTINUED | OUTPATIENT
Start: 2019-06-27 | End: 2019-06-27 | Stop reason: HOSPADM

## 2019-06-27 RX ADMIN — DEXTROSE MONOHYDRATE 2 G: 50 INJECTION, SOLUTION INTRAVENOUS at 23:35

## 2019-06-27 RX ADMIN — Medication 10 ML: at 23:35

## 2019-06-27 RX ADMIN — OXYCODONE HYDROCHLORIDE 10 MG: 10 TABLET ORAL at 19:20

## 2019-06-27 RX ADMIN — SENNOSIDES,DOCUSATE SODIUM 1 TABLET: 8.6; 5 TABLET, FILM COATED ORAL at 16:11

## 2019-06-27 RX ADMIN — DOCUSATE SODIUM 100 MG: 100 CAPSULE, LIQUID FILLED ORAL at 16:12

## 2019-06-27 RX ADMIN — FLUTICASONE PROPIONATE 1 SPRAY: 50 SPRAY, METERED NASAL at 16:11

## 2019-06-27 RX ADMIN — DEXAMETHASONE SODIUM PHOSPHATE 10 MG: 10 INJECTION INTRAMUSCULAR; INTRAVENOUS at 08:08

## 2019-06-27 RX ADMIN — SODIUM CHLORIDE, POTASSIUM CHLORIDE, SODIUM LACTATE AND CALCIUM CHLORIDE: 600; 310; 30; 20 INJECTION, SOLUTION INTRAVENOUS at 08:03

## 2019-06-27 RX ADMIN — Medication 2 G: at 08:00

## 2019-06-27 RX ADMIN — OXYCODONE HYDROCHLORIDE 5 MG: 5 TABLET ORAL at 14:24

## 2019-06-27 RX ADMIN — ONDANSETRON HYDROCHLORIDE 4 MG: 2 INJECTION, SOLUTION INTRAMUSCULAR; INTRAVENOUS at 09:36

## 2019-06-27 RX ADMIN — PHENYLEPHRINE HYDROCHLORIDE 100 MCG: 10 INJECTION INTRAVENOUS at 08:59

## 2019-06-27 RX ADMIN — CYCLOBENZAPRINE 10 MG: 10 TABLET, FILM COATED ORAL at 14:24

## 2019-06-27 RX ADMIN — MORPHINE SULFATE 2 MG: 2 INJECTION, SOLUTION INTRAMUSCULAR; INTRAVENOUS at 21:59

## 2019-06-27 RX ADMIN — SENNOSIDES,DOCUSATE SODIUM 1 TABLET: 8.6; 5 TABLET, FILM COATED ORAL at 21:52

## 2019-06-27 RX ADMIN — FENTANYL CITRATE 100 MCG: 50 INJECTION, SOLUTION INTRAMUSCULAR; INTRAVENOUS at 07:59

## 2019-06-27 RX ADMIN — Medication 0.6 MG: at 10:16

## 2019-06-27 RX ADMIN — DEXTROSE MONOHYDRATE 2 G: 50 INJECTION, SOLUTION INTRAVENOUS at 16:32

## 2019-06-27 RX ADMIN — CYCLOBENZAPRINE 10 MG: 10 TABLET, FILM COATED ORAL at 21:52

## 2019-06-27 RX ADMIN — MIDAZOLAM HYDROCHLORIDE 2 MG: 1 INJECTION, SOLUTION INTRAMUSCULAR; INTRAVENOUS at 07:53

## 2019-06-27 RX ADMIN — ROCURONIUM BROMIDE 50 MG: 10 INJECTION, SOLUTION INTRAVENOUS at 07:59

## 2019-06-27 RX ADMIN — Medication 10 ML: at 21:00

## 2019-06-27 RX ADMIN — HYDROMORPHONE HYDROCHLORIDE 0.5 MG: 1 INJECTION, SOLUTION INTRAMUSCULAR; INTRAVENOUS; SUBCUTANEOUS at 10:55

## 2019-06-27 RX ADMIN — LIDOCAINE HYDROCHLORIDE 100 MG: 20 INJECTION, SOLUTION INTRAVENOUS at 07:59

## 2019-06-27 RX ADMIN — PROPOFOL 200 MG: 10 INJECTION, EMULSION INTRAVENOUS at 07:59

## 2019-06-27 RX ADMIN — SODIUM CHLORIDE: 9 INJECTION, SOLUTION INTRAVENOUS at 07:53

## 2019-06-27 RX ADMIN — HYDROMORPHONE HYDROCHLORIDE 0.5 MG: 1 INJECTION, SOLUTION INTRAMUSCULAR; INTRAVENOUS; SUBCUTANEOUS at 10:49

## 2019-06-27 RX ADMIN — Medication 3 MG: at 10:16

## 2019-06-27 RX ADMIN — MULTIPLE VITAMINS W/ MINERALS TAB 1 TABLET: TAB at 16:11

## 2019-06-27 RX ADMIN — PHENYLEPHRINE HYDROCHLORIDE 100 MCG: 10 INJECTION INTRAVENOUS at 09:09

## 2019-06-27 RX ADMIN — BISACODYL 5 MG: 5 TABLET, COATED ORAL at 16:11

## 2019-06-27 ASSESSMENT — PULMONARY FUNCTION TESTS
PIF_VALUE: 32
PIF_VALUE: 31
PIF_VALUE: 23
PIF_VALUE: 22
PIF_VALUE: 32
PIF_VALUE: 33
PIF_VALUE: 33
PIF_VALUE: 34
PIF_VALUE: 33
PIF_VALUE: 33
PIF_VALUE: 17
PIF_VALUE: 4
PIF_VALUE: 32
PIF_VALUE: 33
PIF_VALUE: 32
PIF_VALUE: 31
PIF_VALUE: 33
PIF_VALUE: 21
PIF_VALUE: 33
PIF_VALUE: 31
PIF_VALUE: 22
PIF_VALUE: 31
PIF_VALUE: 31
PIF_VALUE: 32
PIF_VALUE: 33
PIF_VALUE: 31
PIF_VALUE: 1
PIF_VALUE: 33
PIF_VALUE: 25
PIF_VALUE: 33
PIF_VALUE: 32
PIF_VALUE: 6
PIF_VALUE: 31
PIF_VALUE: 2
PIF_VALUE: 15
PIF_VALUE: 18
PIF_VALUE: 32
PIF_VALUE: 33
PIF_VALUE: 32
PIF_VALUE: 33
PIF_VALUE: 0
PIF_VALUE: 17
PIF_VALUE: 1
PIF_VALUE: 24
PIF_VALUE: 24
PIF_VALUE: 33
PIF_VALUE: 32
PIF_VALUE: 21
PIF_VALUE: 32
PIF_VALUE: 24
PIF_VALUE: 33
PIF_VALUE: 4
PIF_VALUE: 32
PIF_VALUE: 22
PIF_VALUE: 26
PIF_VALUE: 32
PIF_VALUE: 33
PIF_VALUE: 33
PIF_VALUE: 4
PIF_VALUE: 33
PIF_VALUE: 32
PIF_VALUE: 33
PIF_VALUE: 32
PIF_VALUE: 21
PIF_VALUE: 19
PIF_VALUE: 33
PIF_VALUE: 20
PIF_VALUE: 26
PIF_VALUE: 33
PIF_VALUE: 33
PIF_VALUE: 31
PIF_VALUE: 33
PIF_VALUE: 32
PIF_VALUE: 32
PIF_VALUE: 15
PIF_VALUE: 21
PIF_VALUE: 33
PIF_VALUE: 2
PIF_VALUE: 33
PIF_VALUE: 32
PIF_VALUE: 32
PIF_VALUE: 33
PIF_VALUE: 17
PIF_VALUE: 32
PIF_VALUE: 16
PIF_VALUE: 27
PIF_VALUE: 32
PIF_VALUE: 33
PIF_VALUE: 32
PIF_VALUE: 33
PIF_VALUE: 32
PIF_VALUE: 33
PIF_VALUE: 27
PIF_VALUE: 19
PIF_VALUE: 34
PIF_VALUE: 28
PIF_VALUE: 18
PIF_VALUE: 33
PIF_VALUE: 33
PIF_VALUE: 31
PIF_VALUE: 24
PIF_VALUE: 32
PIF_VALUE: 24
PIF_VALUE: 23
PIF_VALUE: 25
PIF_VALUE: 32
PIF_VALUE: 31
PIF_VALUE: 33
PIF_VALUE: 15
PIF_VALUE: 33
PIF_VALUE: 23
PIF_VALUE: 17
PIF_VALUE: 17
PIF_VALUE: 24
PIF_VALUE: 31
PIF_VALUE: 12
PIF_VALUE: 11
PIF_VALUE: 33
PIF_VALUE: 33
PIF_VALUE: 27
PIF_VALUE: 33
PIF_VALUE: 20
PIF_VALUE: 23
PIF_VALUE: 33
PIF_VALUE: 32
PIF_VALUE: 24
PIF_VALUE: 31
PIF_VALUE: 31
PIF_VALUE: 33
PIF_VALUE: 29
PIF_VALUE: 32
PIF_VALUE: 0
PIF_VALUE: 33
PIF_VALUE: 26
PIF_VALUE: 0
PIF_VALUE: 30
PIF_VALUE: 0

## 2019-06-27 ASSESSMENT — PAIN DESCRIPTION - FREQUENCY
FREQUENCY: CONTINUOUS
FREQUENCY: CONTINUOUS

## 2019-06-27 ASSESSMENT — PAIN SCALES - GENERAL
PAINLEVEL_OUTOF10: 0
PAINLEVEL_OUTOF10: 0
PAINLEVEL_OUTOF10: 4
PAINLEVEL_OUTOF10: 0
PAINLEVEL_OUTOF10: 7
PAINLEVEL_OUTOF10: 4
PAINLEVEL_OUTOF10: 0
PAINLEVEL_OUTOF10: 7
PAINLEVEL_OUTOF10: 8
PAINLEVEL_OUTOF10: 4
PAINLEVEL_OUTOF10: 4
PAINLEVEL_OUTOF10: 0
PAINLEVEL_OUTOF10: 0

## 2019-06-27 ASSESSMENT — PAIN - FUNCTIONAL ASSESSMENT
PAIN_FUNCTIONAL_ASSESSMENT: 0-10
PAIN_FUNCTIONAL_ASSESSMENT: PREVENTS OR INTERFERES SOME ACTIVE ACTIVITIES AND ADLS

## 2019-06-27 ASSESSMENT — PAIN DESCRIPTION - LOCATION
LOCATION: NECK
LOCATION: NECK

## 2019-06-27 ASSESSMENT — PAIN DESCRIPTION - ORIENTATION
ORIENTATION: POSTERIOR
ORIENTATION: POSTERIOR

## 2019-06-27 ASSESSMENT — PAIN DESCRIPTION - PAIN TYPE
TYPE: SURGICAL PAIN
TYPE: SURGICAL PAIN

## 2019-06-27 ASSESSMENT — PAIN DESCRIPTION - DESCRIPTORS
DESCRIPTORS: ACHING;BURNING;CONSTANT
DESCRIPTORS: ACHING;BURNING;CONSTANT
DESCRIPTORS: ACHING;SHARP;SHOOTING;NUMBNESS;TINGLING

## 2019-06-27 NOTE — PROGRESS NOTES
to sit: Mod I   Sit to supine: Mod I   Functional Transfers Sit to stand:Min A   Stand to sit:Min A  Stand pivot: Min A with w/w  Commode: NT  Sup   Functional Mobility Min A with w/w few steps from EOB to bedside chair. Sup with household distances using w/w   Balance Sitting:     Static:  SBA    Dynamic:CGA  Standing: Min A     Activity Tolerance Poor+/Fair- with lt. ax. Minimally limited d/t dizziness, pain. Pulse ox. WFL (95-96%) on 1.5L O2 via NC     Visual/  Perceptual Glasses: yes                Hand dominance: R  UE ROM: RUE: R shld. to 90, crepitus noted, distally WFL    LUE: L shld. to 100, distally WFL  Strength: RUE: shld. 3/5, 3+/4- distally        LUE: shld. 3+/5, 3+/4- distally   Strength: B WFL  Fine Motor Coordination: B Fair/ WFL with min. Increased time for dexterity    Hearing: [email or web address]  Sensation: B hands:  c/o numbness or tingling radial sided  Tone: B UE: WFL  Edema: None noted B UEs                            Comments/Treatment: Upon arrival, patient supine in bed, son present throughout. Pt. Assisted with bed mobility, ADLs at EOB during facilitated sitting ax., functional transfer training with focus on safety, technique, precautions & neutral mechanics. At end of session, patient seated in bedside chair, RN notified, son supervising pt. While up, all needs met, with call light and phone within reach, all lines and tubes intact. Pt. Instructed RE: safe transfers/mobility, ADLs, role of OT, treatment plan, recs. , prec. Pt would benefit from continued skilled OT to increase functional independence and quality of life. Eval Complexity: Low  · History: Expanded chart review of medical records and additional review of physical, cognitive, or psychosocial history related to current functional performance  · Exam: 3+ performance deficits  · Assistance/Modification: Min/mod assistance or modifications required to perform tasks.  May have comorbidities that affect occupational

## 2019-06-27 NOTE — ANESTHESIA POSTPROCEDURE EVALUATION
Department of Anesthesiology  Postprocedure Note    Patient: Monik Tan  MRN: [id number]  YOB: 1950  Date of evaluation: 6/27/2019  Time:  11:14 AM     Procedure Summary     Date:  06/27/19 [place]  [facility] [place] [facility] [place]    Anesthesia Start:  5992 Anesthesia Stop:  1030    Procedure:  POSTERIOR C3-C6 LAMINECTOMY AND FUSION -- NEEDS PLATES, SCREWS, CRISTÓBAL TABLE, CELL SAVER - GLOBUS (N/A ) Diagnosis:  (CERVICAL STENOSIS)    Surgeon:  Clemente Flores MD Responsible Provider:  Richardson Sullivan DO    Anesthesia Type:  general ASA Status:  2          Anesthesia Type: general    Amy Phase I: Amy Score: 10    Amy Phase II:      Last vitals: Reviewed and per EMR flowsheets.        Anesthesia Post Evaluation    Patient location during evaluation: PACU  Patient participation: complete - patient participated  Level of consciousness: awake and alert  Airway patency: patent  Nausea & Vomiting: no nausea and no vomiting  Complications: no  Cardiovascular status: blood pressure returned to baseline  Respiratory status: acceptable  Hydration status: euvolemic

## 2019-06-27 NOTE — H&P
I have examined the patient and reviewed the H and P and there are no changes noted    Goyo Anguiano
present. No tracheal deviation present. No thyromegaly present. Pulmonary/Chest: No stridor. No apnea, no tachypnea and no bradypnea. She is not intubated. Abdominal: Normal appearance. She exhibits no distension. Musculoskeletal: Normal range of motion. She exhibits no edema, tenderness or deformity. Lymphadenopathy:     She has no cervical adenopathy. Neurological: She is alert and oriented to person, place, and time. She is not disoriented. She displays no atrophy, no tremor and normal reflexes. No cranial nerve deficit or sensory deficit. She exhibits normal muscle tone. She displays a negative Romberg sign. She displays no seizure activity. Coordination and gait normal. GCS eye subscore is 4. GCS verbal subscore is 5. GCS motor subscore is 6. She displays no Babinski's sign on the right side. She displays no Babinski's sign on the left side. Reflex Scores:       Tricep reflexes are 3+ on the right side and 3+ on the left side. Bicep reflexes are 3+ on the right side and 3+ on the left side. Brachioradialis reflexes are 3+ on the right side and 3+ on the left side. Patellar reflexes are 3+ on the right side and 3+ on the left side. Achilles reflexes are 3+ on the right side and 3+ on the left side. 4/5 in all ext   Skin: Skin is warm and dry. No rash noted. She is not diaphoretic. No erythema. No pallor. Psychiatric: She has a normal mood and affect. Her behavior is normal. Judgment and thought content normal.   Vitals reviewed.        Assessment:   77year old lady who presents with neck pain that radiates into her arms. Her MRI shows severe stenosis from C3-C6                Plan:   I am recommending a C3-C6 posterior cervical laminectomy and fusion. R/B/A of surgery have been discussed and she wishes to proceed with surgery.

## 2019-06-28 PROCEDURE — 2580000003 HC RX 258: Performed by: NEUROLOGICAL SURGERY

## 2019-06-28 PROCEDURE — 97530 THERAPEUTIC ACTIVITIES: CPT

## 2019-06-28 PROCEDURE — 1200000000 HC SEMI PRIVATE

## 2019-06-28 PROCEDURE — 6360000002 HC RX W HCPCS: Performed by: NEUROLOGICAL SURGERY

## 2019-06-28 PROCEDURE — 6370000000 HC RX 637 (ALT 250 FOR IP): Performed by: NEUROLOGICAL SURGERY

## 2019-06-28 PROCEDURE — 97535 SELF CARE MNGMENT TRAINING: CPT

## 2019-06-28 RX ORDER — OXYCODONE HYDROCHLORIDE 5 MG/1
5 TABLET ORAL EVERY 4 HOURS PRN
Qty: 42 TABLET | Refills: 0 | Status: SHIPPED | OUTPATIENT
Start: 2019-06-28 | End: 2019-07-05

## 2019-06-28 RX ORDER — CYCLOBENZAPRINE HCL 10 MG
10 TABLET ORAL 3 TIMES DAILY PRN
Qty: 40 TABLET | Refills: 0 | Status: ON HOLD | OUTPATIENT
Start: 2019-06-28 | End: 2019-08-09 | Stop reason: HOSPADM

## 2019-06-28 RX ADMIN — OXYCODONE HYDROCHLORIDE 10 MG: 10 TABLET ORAL at 20:59

## 2019-06-28 RX ADMIN — SENNOSIDES,DOCUSATE SODIUM 1 TABLET: 8.6; 5 TABLET, FILM COATED ORAL at 20:59

## 2019-06-28 RX ADMIN — BISACODYL 5 MG: 5 TABLET, COATED ORAL at 09:02

## 2019-06-28 RX ADMIN — DEXTROSE MONOHYDRATE 2 G: 50 INJECTION, SOLUTION INTRAVENOUS at 08:55

## 2019-06-28 RX ADMIN — SENNOSIDES,DOCUSATE SODIUM 1 TABLET: 8.6; 5 TABLET, FILM COATED ORAL at 09:02

## 2019-06-28 RX ADMIN — OXYCODONE HYDROCHLORIDE 10 MG: 10 TABLET ORAL at 12:50

## 2019-06-28 RX ADMIN — DEXTROSE MONOHYDRATE 2 G: 50 INJECTION, SOLUTION INTRAVENOUS at 16:18

## 2019-06-28 RX ADMIN — DOCUSATE SODIUM 100 MG: 100 CAPSULE, LIQUID FILLED ORAL at 09:02

## 2019-06-28 RX ADMIN — Medication 10 ML: at 21:00

## 2019-06-28 RX ADMIN — ESCITALOPRAM OXALATE 20 MG: 10 TABLET, FILM COATED ORAL at 09:02

## 2019-06-28 RX ADMIN — MULTIPLE VITAMINS W/ MINERALS TAB 1 TABLET: TAB at 09:02

## 2019-06-28 RX ADMIN — Medication 10 ML: at 08:57

## 2019-06-28 RX ADMIN — AMLODIPINE BESYLATE 5 MG: 5 TABLET ORAL at 09:02

## 2019-06-28 RX ADMIN — FLUTICASONE PROPIONATE 1 SPRAY: 50 SPRAY, METERED NASAL at 09:02

## 2019-06-28 RX ADMIN — DOCUSATE SODIUM 100 MG: 100 CAPSULE, LIQUID FILLED ORAL at 20:59

## 2019-06-28 ASSESSMENT — PAIN DESCRIPTION - ONSET: ONSET: ON-GOING

## 2019-06-28 ASSESSMENT — PAIN DESCRIPTION - DESCRIPTORS
DESCRIPTORS: ACHING;CONSTANT;SORE
DESCRIPTORS: CONSTANT;CRUSHING;SHARP

## 2019-06-28 ASSESSMENT — PAIN DESCRIPTION - PAIN TYPE
TYPE: SURGICAL PAIN
TYPE: SURGICAL PAIN

## 2019-06-28 ASSESSMENT — PAIN SCALES - GENERAL
PAINLEVEL_OUTOF10: 2
PAINLEVEL_OUTOF10: 4
PAINLEVEL_OUTOF10: 8
PAINLEVEL_OUTOF10: 8

## 2019-06-28 ASSESSMENT — PAIN - FUNCTIONAL ASSESSMENT: PAIN_FUNCTIONAL_ASSESSMENT: PREVENTS OR INTERFERES SOME ACTIVE ACTIVITIES AND ADLS

## 2019-06-28 ASSESSMENT — PAIN DESCRIPTION - FREQUENCY: FREQUENCY: CONTINUOUS

## 2019-06-28 ASSESSMENT — PAIN DESCRIPTION - LOCATION
LOCATION: NECK
LOCATION: NECK

## 2019-06-28 ASSESSMENT — PAIN DESCRIPTION - ORIENTATION: ORIENTATION: POSTERIOR

## 2019-06-28 NOTE — DISCHARGE INSTR - COC
Wt 186 lb (84.4 kg)   SpO2 93%   BMI 31.43 kg/m²     Last documented pain score (0-10 scale): Pain Level: 2  Last Weight:   Wt Readings from Last 1 Encounters:   06/27/19 186 lb (84.4 kg)     Mental Status:  oriented, alert, coherent, logical, thought processes intact and able to concentrate and follow conversation    IV Access:  - None    Nursing Mobility/ADLs:  Walking   Assisted  Transfer  Assisted  Bathing  Assisted  Dressing  Assisted  Toileting  Assisted  Feeding  Independent  Med [facility]  Assisted  Med Delivery   whole    Wound Care Documentation and Therapy:        Elimination:  Continence:   · Bowel: Yes  · Bladder: Yes  Urinary Catheter: None   Colostomy/Ileostomy/Ileal Conduit: No       Date of Last BM: 06/26/2019    Intake/Output Summary (Last 24 hours) at 6/28/2019 0907  Last data filed at 6/28/2019 0838  Gross per 24 hour   Intake 1940 ml   Output 3180 ml   Net -1240 ml     I/O last 3 completed shifts: In: 2000 [P.O.:600; I.V.:1400]  Out: 2240 [Urine:1950; Drains:90; Blood:200]    Safety Concerns: At Risk for Falls    Impairments/Disabilities:      Vision and Hearing    Nutrition Therapy:  Current Nutrition Therapy:   - Oral Diet:  General    Routes of Feeding: Oral  Liquids: No Restrictions  Daily Fluid Restriction: no  Last Modified Barium Swallow with Video (Video Swallowing Test): not done    Treatments at the Time of Hospital Discharge:   Respiratory Treatments: N/A  Oxygen Therapy:  is not on home oxygen therapy.   Ventilator:    - No ventilator support    Rehab Therapies: Physical Therapy and Occupational Therapy  Weight Bearing Status/Restrictions: No weight bearing restirctions  Other Medical Equipment (for information only, NOT a DME order):  N/A  Other Treatments: ***    Patient's personal belongings (please select all that are sent with patient):  Glasses, Hearing Aides left    RN SIGNATURE:  Electronically signed by Franklin Villavicencio RN on 6/29/19 at 12:36 PM    CASE MANAGEMENT/SOCIAL WORK SECTION    Inpatient Status Date: ***    Readmission Risk Assessment Score:  Readmission Risk              Risk of Unplanned Readmission:        5           Discharging to Facility/ Agency   · Name: [facility]  · Address:  · Phone:  · Fax:    Dialysis Facility (if applicable)   · Name:  · Address:  · Dialysis Schedule:  · Phone:  · Fax:    / signature: Electronically signed by ANTONIO Acosta on 6/29/19 at 8:28 AM    PHYSICIAN SECTION    Prognosis: Good    Condition at Discharge: Stable    Rehab Potential (if transferring to Rehab): Good    Recommended Labs or Other Treatments After Discharge: ***    Physician Certification: I certify the above information and transfer of Tara Beverly  is necessary for the continuing treatment of the diagnosis listed and that she requires [place] for less 30 days.      Update Admission H&P: [id number]}    PHYSICIAN SIGNATURE:  Electronically signed by DRAKE Valdez on 6/28/19 at 9:07 AM

## 2019-06-28 NOTE — PLAN OF CARE
Problem: Falls - Risk of:  Goal: Will remain free from falls  Description  Will remain free from falls  Outcome: Met This Shift  Goal: Absence of physical injury  Description  Absence of physical injury  Outcome: Met This Shift     Problem: Infection - Surgical Site:  Goal: Will show no infection signs and symptoms  Description  Will show no infection signs and symptoms  Outcome: Met This Shift

## 2019-06-28 NOTE — PROGRESS NOTES
Post-op Instructions Reinforced:    Use of Incentive Spirometry q2h- encourage use  Use of PCD's when stationary in bed-reinforced importance  Pain Control- Use of pain scale and communication(white) board for pain med availability, Frequency and time available. Encourage up to chair for each meal-reinforce importance  Progress diet slowly-avoid nausea/vomiting  S/S to alert staff- sudden increase of pain/numbness of extremities, CP/SOB  Setting realistic pain goals-reaching goal before discharge. ALWAYS keep call light in reach !!  Reinforce hospital/unit expectations    Discharge Plan-Await PT/OT to assist with D/C planning. . Pt receptive to  HHC/Rehab if recommended.     Verbalized understanding of all of above-contact number given    Jorge Oropeza RN, Spine Navigator  [phone]

## 2019-06-28 NOTE — PLAN OF CARE
Problem: Falls - Risk of:  Goal: Will remain free from falls  Description  Will remain free from falls  Outcome: Met This Shift  Goal: Absence of physical injury  Description  Absence of physical injury  Outcome: Met This Shift     Problem: Discharge Planning:  Goal: Discharged to appropriate level of care  Description  Discharged to appropriate level of care  Outcome: Met This Shift     Problem: Infection - Surgical Site:  Goal: Will show no infection signs and symptoms  Description  Will show no infection signs and symptoms  Outcome: Met This Shift

## 2019-06-28 NOTE — PROGRESS NOTES
Physical Therapy  Treatment  Name: Anna Garcia  : 1950  MRN: [id number]     Date of Service: 2019     Evaluating Therapist: Sheila Michaels PT, DPT  [id number]     Room #: 9689/3245-O  DIAGNOSIS: Cervical Stenosis  PROCEDURE: POSTERIOR C3-C6 LAMINECTOMY AND FUSION (19)  PRECAUTIONS: Falls, Cervical Precautions, Custom Cervical Collar, O2, hemovac  PMH: Anxiety, depression, OA, R KRISTAL       Social:  Pt lives alone in a 2 story home with 4 stairs and 0 rail/s to enter. There are 12-14 steps and 1 rail/s to second floor bed/bath. Prior to admission pt ambulated with no AD. Pt owns [facility] from hip surgery, but doesn't use it.       Initial Evaluation  Date: 19 Treatment   19 Short Term/ Long Term   Goals   Was pt agreeable to Eval/treatment? Yes  Yes     Does pt have pain? Pt reported R posterior neck/upper back pain       Bed Mobility  Rolling: NT  Supine to sit: NT  Sit to supine: NT  Scooting: NT  Rolling: NT  Supine to sit: NT  Sit to supine: NT  Scooting: NT  Patient sitting EOB upon entering room. SBA   Transfers Sit to stand: Min A  Stand to sit: Min A  Stand pivot: Min A  Sit to stand: Min A to fww. Stand to sit: Min A cues for hand placement. Stand pivot: Min A with fww SBA   Ambulation   50 feet x2 reps using No AD with Min A  100 feet with fww for support. Sitting rest then 100 feet also with fww. Cues required to stay behind device. >200 feet using AAD if needed with SBA   Stair negotiation:  NT  NT >10 steps using 1 rail with SBA   ROM RLE: WFL  LLE: WFL       Strength RLE: 4+/5  LLE: 4+/5       Balance    Sitting: SBA  Standing: Min A using no AD       AM-PAC Basic Mobility Inpatient Short Form Raw Score:          Patient is A&Ox4. Sensation: pt reports \"numb\" feeling in R neck; pt reports h/o numbness/tingling in BLE  Coordination: NT  Edema: None noted     ASSESSMENT    Pt displays functional ability as noted in the objective portion of this evaluation.    Comments/Treatment:  Pt was cleared by RN prior to PT treatment. Pt was received sitting up EOB  and was agreeable to PT treatment. Pt amb with decreased step length, minimal heel strike and narrow JAMES required fww for support. Cues required to use fww safely. Gait completed for a distance of 100 feet. Distance limited by current endurance level. Gait then completed for 2nd rep also approximately 100 feet. Patient educated in spinal precautions with good understanding. Also completed sitting BLE exercises with cues for completion. Pt was left sitting up with call button within reach and all needs met.      Patient education  Pt educated on PT role, spinal precautions, bed mobility, transfer technique, gait training and postural reducation.       Patient response to education:   Pt verbalized understanding Pt demonstrated skill Pt requires further education in this area   Yes Requires assist/VCs Yes      Rehab potential is Good for reaching above PT goals.       Pts/ family goals     1. To get strength back and be pain free.      Patient and or family understand(s) diagnosis, prognosis, and plan of care.     PLAN    PT care will be provided in accordance with the objectives noted above. Whenever appropriate, clear delegation orders will be provided for nursing staff. Exercises and functional mobility practice will be used as well as appropriate assistive devices or modalities to obtain goals.  Patient and family education will also be administered as needed.     Frequency of treatments will be 2-5x/week x 7-10 days.     Time in: Giovana Mendoza  Time out:  Khanh [address], [address] [facility]

## 2019-06-28 NOTE — PROGRESS NOTES
Please notify Dr Olga Hilario for any internal med needs.  Patient's PCP is covered by Dr Olga Hilario  Thank you  Ken Zamora, [facility] Team

## 2019-06-28 NOTE — CARE COORDINATION
DISCHARGE PLANNING:    Spoke with patient at the bedside regarding transition of care upon discharge. Pt lives in a 2 story home alone with about 13 steps. Pt plan is to discharge home and or [address] pending PT/OT recommendations. Pt has KEE history with [facility] and no C history. Requires no DME at this time. Pt PCP is Dr. Orion Merlin. She uses [facility] on [address]. Her son Livia Sanders will be providing transportation when discharged. A referral was made to [facility]) [phone]. Awaiting a call back. I encouraged the patient to follow up with me if she has any further questions. I will continue to follow.

## 2019-06-29 VITALS
WEIGHT: 186 LBS | HEART RATE: 101 BPM | RESPIRATION RATE: 16 BRPM | HEIGHT: 65 IN | DIASTOLIC BLOOD PRESSURE: 72 MMHG | OXYGEN SATURATION: 94 % | TEMPERATURE: 98 F | SYSTOLIC BLOOD PRESSURE: 134 MMHG | BODY MASS INDEX: 30.99 KG/M2

## 2019-06-29 PROCEDURE — 6370000000 HC RX 637 (ALT 250 FOR IP): Performed by: NEUROLOGICAL SURGERY

## 2019-06-29 PROCEDURE — 6360000002 HC RX W HCPCS: Performed by: NEUROLOGICAL SURGERY

## 2019-06-29 PROCEDURE — 97530 THERAPEUTIC ACTIVITIES: CPT

## 2019-06-29 PROCEDURE — 2580000003 HC RX 258: Performed by: NEUROLOGICAL SURGERY

## 2019-06-29 RX ADMIN — OXYCODONE HYDROCHLORIDE 10 MG: 10 TABLET ORAL at 07:00

## 2019-06-29 RX ADMIN — MORPHINE SULFATE 4 MG: 4 INJECTION, SOLUTION INTRAMUSCULAR; INTRAVENOUS at 01:04

## 2019-06-29 RX ADMIN — DEXTROSE MONOHYDRATE 2 G: 50 INJECTION, SOLUTION INTRAVENOUS at 01:23

## 2019-06-29 RX ADMIN — AMLODIPINE BESYLATE 5 MG: 5 TABLET ORAL at 09:02

## 2019-06-29 RX ADMIN — ESCITALOPRAM OXALATE 20 MG: 10 TABLET, FILM COATED ORAL at 09:02

## 2019-06-29 RX ADMIN — DOCUSATE SODIUM 100 MG: 100 CAPSULE, LIQUID FILLED ORAL at 09:02

## 2019-06-29 RX ADMIN — DEXTROSE MONOHYDRATE 2 G: 50 INJECTION, SOLUTION INTRAVENOUS at 09:01

## 2019-06-29 RX ADMIN — FLUTICASONE PROPIONATE 1 SPRAY: 50 SPRAY, METERED NASAL at 09:03

## 2019-06-29 RX ADMIN — CYCLOBENZAPRINE 10 MG: 10 TABLET, FILM COATED ORAL at 07:00

## 2019-06-29 RX ADMIN — MULTIPLE VITAMINS W/ MINERALS TAB 1 TABLET: TAB at 09:03

## 2019-06-29 RX ADMIN — Medication 10 ML: at 09:02

## 2019-06-29 ASSESSMENT — PAIN SCALES - GENERAL
PAINLEVEL_OUTOF10: 4
PAINLEVEL_OUTOF10: 8
PAINLEVEL_OUTOF10: 7
PAINLEVEL_OUTOF10: 0
PAINLEVEL_OUTOF10: 8

## 2019-06-29 ASSESSMENT — PAIN DESCRIPTION - LOCATION
LOCATION: NECK;SHOULDER
LOCATION: SHOULDER;NECK

## 2019-06-29 ASSESSMENT — PAIN DESCRIPTION - ORIENTATION
ORIENTATION: RIGHT
ORIENTATION: RIGHT

## 2019-06-29 ASSESSMENT — PAIN DESCRIPTION - DESCRIPTORS
DESCRIPTORS: BURNING;ACHING;DISCOMFORT
DESCRIPTORS: ACHING;DISCOMFORT;SORE

## 2019-06-29 ASSESSMENT — PAIN DESCRIPTION - PAIN TYPE
TYPE: SURGICAL PAIN
TYPE: SURGICAL PAIN

## 2019-06-29 NOTE — PLAN OF CARE
Problem: Falls - Risk of:  Goal: Will remain free from falls  Description  Will remain free from falls  Outcome: Met This Shift     Problem: Infection - Surgical Site:  Goal: Will show no infection signs and symptoms  Description  Will show no infection signs and symptoms  Outcome: Met This Shift     Problem: Mobility - Impaired:  Goal: Mobility will improve to maximum level  Description  Mobility will improve to maximum level  Outcome: Ongoing

## 2019-06-29 NOTE — PROGRESS NOTES
Department of Neurosurgery  Progress Note    CHIEF COMPLAINT: s/p cervical fusion    SUBJECTIVE:  natasha op site pain well. REVIEW OF SYSTEMS :  Constitutional: Negative for chills and fever. Neurological: Negative for dizziness, tremors and speech change. OBJECTIVE:   VITALS:  /78   Pulse 98   Temp 97.3 °F (36.3 °C) (Temporal)   Resp 14   Ht 5' 4.5\" (1.638 m)   Wt 186 lb (84.4 kg)   SpO2 95%   BMI 31.43 kg/m²   PHYSICAL:  CONSTITUTIONAL:  awake, alert, cooperative, no apparent distress, and appears stated age. ARAUJO.   FC    DATA:  CBC:   Lab Results   Component Value Date    WBC 7.7 06/21/2019    RBC 4.57 06/21/2019    HGB 13.3 06/21/2019    HCT 41.0 06/21/2019    MCV 89.7 06/21/2019    MCH 29.1 06/21/2019    MCHC 32.4 06/21/2019    RDW 13.6 06/21/2019     06/21/2019    MPV 10.1 06/21/2019     BMP:    Lab Results   Component Value Date     06/21/2019    K 4.2 06/21/2019     06/21/2019    CO2 28 06/21/2019    BUN 13 06/21/2019    LABALBU 3.6 04/28/2015    CREATININE 0.6 06/21/2019    CALCIUM 9.4 06/21/2019    GFRAA >60 06/21/2019    LABGLOM >60 06/21/2019    GLUCOSE 120 06/21/2019     PT/INR:    Lab Results   Component Value Date    PROTIME 11.2 06/21/2019    INR 1.0 06/21/2019     PTT:  No results found for: APTT, PTT[APTT}    Current Inpatient Medications  Current Facility-Administered Medications: amLODIPine (NORVASC) tablet 5 mg, 5 mg, Oral, Daily  escitalopram (LEXAPRO) tablet 20 mg, 20 mg, Oral, Daily  fluticasone (FLONASE) 50 MCG/ACT nasal spray 1 spray, 1 spray, Nasal, Daily  therapeutic multivitamin-minerals 1 tablet, 1 tablet, Oral, Daily  sodium chloride flush 0.9 % injection 10 mL, 10 mL, Intravenous, 2 times per day  sodium chloride flush 0.9 % injection 10 mL, 10 mL, Intravenous, PRN  docusate sodium (COLACE) capsule 100 mg, 100 mg, Oral, BID  ondansetron (ZOFRAN) injection 4 mg, 4 mg, Intravenous, Q6H PRN  ceFAZolin (ANCEF) 2 g in dextrose 5 % 50 mL IVPB, 2 g,

## 2019-06-30 ENCOUNTER — APPOINTMENT (OUTPATIENT)
Dept: GENERAL RADIOLOGY | Age: 69
DRG: 908 | End: 2019-06-30
Payer: MEDICARE

## 2019-06-30 ENCOUNTER — APPOINTMENT (OUTPATIENT)
Dept: CT IMAGING | Age: 69
DRG: 908 | End: 2019-06-30
Payer: MEDICARE

## 2019-06-30 ENCOUNTER — HOSPITAL ENCOUNTER (EMERGENCY)
Age: 69
Discharge: HOME | DRG: 908 | End: 2019-06-30
Attending: EMERGENCY MEDICINE
Payer: MEDICARE

## 2019-06-30 VITALS
HEIGHT: 64 IN | RESPIRATION RATE: 18 BRPM | SYSTOLIC BLOOD PRESSURE: 118 MMHG | HEART RATE: 88 BPM | WEIGHT: 186 LBS | TEMPERATURE: 98.7 F | DIASTOLIC BLOOD PRESSURE: 82 MMHG | BODY MASS INDEX: 31.76 KG/M2

## 2019-06-30 DIAGNOSIS — R53.1 GENERAL WEAKNESS: ICD-10-CM
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
## 2019-06-30 LAB
ALBUMIN SERPL-MCNC: 3.7 G/DL (ref 3.5–5.2)
ALP BLD-CCNC: 107 U/L (ref 35–104)
ALT SERPL-CCNC: 57 U/L (ref 0–32)
ANION GAP SERPL CALCULATED.3IONS-SCNC: 15 MMOL/L (ref 7–16)
AST SERPL-CCNC: 62 U/L (ref 0–31)
BACTERIA: ABNORMAL /HPF
BASOPHILS ABSOLUTE: 0.04 E9/L (ref 0–0.2)
BASOPHILS RELATIVE PERCENT: 0.3 % (ref 0–2)
BILIRUB SERPL-MCNC: 0.7 MG/DL (ref 0–1.2)
BILIRUBIN URINE: NEGATIVE
BLOOD, URINE: ABNORMAL
BUN BLDV-MCNC: 11 MG/DL (ref 8–23)
CALCIUM SERPL-MCNC: 9.2 MG/DL (ref 8.6–10.2)
CHLORIDE BLD-SCNC: 95 MMOL/L (ref 98–107)
CLARITY: CLEAR
CO2: 25 MMOL/L (ref 22–29)
COLOR: YELLOW
CREAT SERPL-MCNC: 0.6 MG/DL (ref 0.5–1)
EOSINOPHILS ABSOLUTE: 0.03 E9/L (ref 0.05–0.5)
EOSINOPHILS RELATIVE PERCENT: 0.2 % (ref 0–6)
GFR AFRICAN AMERICAN: >60
GFR NON-AFRICAN AMERICAN: >60 ML/MIN/1.73
GLUCOSE BLD-MCNC: 122 MG/DL (ref 74–99)
GLUCOSE URINE: NEGATIVE MG/DL
HCT VFR BLD CALC: 38.4 % (ref 34–48)
HEMOGLOBIN: 11.9 G/DL (ref 11.5–15.5)
IMMATURE GRANULOCYTES #: 0.09 E9/L
IMMATURE GRANULOCYTES %: 0.6 % (ref 0–5)
KETONES, URINE: NEGATIVE MG/DL
LEUKOCYTE ESTERASE, URINE: ABNORMAL
LYMPHOCYTES ABSOLUTE: 2.59 E9/L (ref 1.5–4)
LYMPHOCYTES RELATIVE PERCENT: 17.5 % (ref 20–42)
MCH RBC QN AUTO: 28.6 PG (ref 26–35)
MCHC RBC AUTO-ENTMCNC: 31 % (ref 32–34.5)
MCV RBC AUTO: 92.3 FL (ref 80–99.9)
MONOCYTES ABSOLUTE: 1.55 E9/L (ref 0.1–0.95)
MONOCYTES RELATIVE PERCENT: 10.4 % (ref 2–12)
NEUTROPHILS ABSOLUTE: 10.54 E9/L (ref 1.8–7.3)
NEUTROPHILS RELATIVE PERCENT: 71 % (ref 43–80)
NITRITE, URINE: NEGATIVE
PDW BLD-RTO: 13.9 FL (ref 11.5–15)
PH UA: 6 (ref 5–9)
PLATELET # BLD: 201 E9/L (ref 130–450)
PMV BLD AUTO: 9.8 FL (ref 7–12)
POTASSIUM REFLEX MAGNESIUM: 3.9 MMOL/L (ref 3.5–5)
PROTEIN UA: ABNORMAL MG/DL
RBC # BLD: 4.16 E12/L (ref 3.5–5.5)
RBC # BLD: NORMAL 10*6/UL
RBC UA: ABNORMAL /HPF (ref 0–2)
SODIUM BLD-SCNC: 135 MMOL/L (ref 132–146)
SPECIFIC GRAVITY UA: 1.01 (ref 1–1.03)
TOTAL PROTEIN: 6.7 G/DL (ref 6.4–8.3)
UROBILINOGEN, URINE: 0.2 E.U./DL
WBC # BLD: 14.8 E9/L (ref 4.5–11.5)
WBC UA: ABNORMAL /HPF (ref 0–5)

## 2019-06-30 PROCEDURE — 71045 X-RAY EXAM CHEST 1 VIEW: CPT

## 2019-06-30 PROCEDURE — 73030 X-RAY EXAM OF SHOULDER: CPT

## 2019-06-30 PROCEDURE — 81001 URINALYSIS AUTO W/SCOPE: CPT

## 2019-06-30 PROCEDURE — 85025 COMPLETE CBC W/AUTO DIFF WBC: CPT

## 2019-06-30 PROCEDURE — 99284 EMERGENCY DEPT VISIT MOD MDM: CPT

## 2019-06-30 PROCEDURE — 72125 CT NECK SPINE W/O DYE: CPT

## 2019-06-30 PROCEDURE — 36415 COLL VENOUS BLD VENIPUNCTURE: CPT

## 2019-06-30 PROCEDURE — 80053 COMPREHEN METABOLIC PANEL: CPT

## 2019-06-30 RX ORDER — 0.9 % SODIUM CHLORIDE 0.9 %
1000 INTRAVENOUS SOLUTION INTRAVENOUS ONCE
Status: DISCONTINUED | OUTPATIENT
Start: 2019-06-30 | End: 2019-06-30 | Stop reason: HOSPADM

## 2019-06-30 ASSESSMENT — PAIN DESCRIPTION - LOCATION: LOCATION: NECK

## 2019-06-30 ASSESSMENT — PAIN SCALES - GENERAL: PAINLEVEL_OUTOF10: 8

## 2019-06-30 NOTE — ED NOTES
Assisted up to BR, gait unsteady, pt c/o R neck pain with any kind of movement and c/o c collar bothering her and stated she \"thinks it's too high for her\". This collar was placed on her post op and this RN instructed her that we could not change it as it was put on post op. C/o feeling tired and stiff from laying on cart. Assisted ambulatory back to room and into w/c for comfort.       Ebonie Abbott RN  06/30/19 3608

## 2019-06-30 NOTE — ED PROVIDER NOTES
ED Attending  CC: Marcelle     Department of Emergency Medicine   ED  Provider Note  Admit Date/RoomTime: 6/30/2019 12:36 PM  ED Room: 33/33  Chief Complaint   Fatigue (x 3 days) and Fall (Pt states it was a controlled fall and she lowered herself to the ground but was unable to get back up due to being weak )    History of Present Illness   Source of history provided by:  patient. History/Exam Limitations: none. Ibeth Wood is a 77 y.o. old female who has a past medical history of:   Past Medical History:   Diagnosis Date    Anxiety     Depression     Osteoarthritis     presents to the emergency department by ambulance where the patient received nothing prior to arrival., for a fall which occured 3 hour(s) prior to arrival. She was reportedly getting ready to shower while at rehab facility prior to incident with no complaints at this time. The patients tetanus status is up to date. Since onset the symptoms have been minimal in degree. Her pain is aggraveated by nothing and relieved by nothing. She denies any head injury, loss of consciousness, neck pain, chest pain, abdominal pain, extremity injury, numbness or weakness. Patient stated that she recently had a a cervical fusion of her C3, 4, 5, and 6 on Thursday, June 27, 2019 per . Patient stated that she was getting ready to use the the bathroom and getting ready to shower when her nurse that was in the room walked away she ended up off balance and slid down the wall in the bathroom. Due to the fact that the patient recently had surgery the facility wanted her neck evaluated. She currently does have a hard cervical collar on per patient she will be wearing that well over 1 month from her surgery. She is currently denying any head pain, neck pain, abdominal pain or any chest pain at this time. Patient states she has been feeling slightly weaker but she thinks it is just due to her having recent surgery.     N/A  ROS    Pertinent positives agreeable with the plan to be discharged. Assessment      1. Fall, initial encounter    2. General weakness      Plan   Discharge to rehab facility  Patient condition is good    New Medications     New Prescriptions    No medications on file     Electronically signed by FARHANA Lopez NP   DD: 6/30/19  **This report was transcribed using voice recognition software. Every effort was made to ensure accuracy; however, inadvertent computerized transcription errors may be present.   END OF ED PROVIDER NOTE      FARHANA Roberson NP  06/30/19 9250

## 2019-07-01 ENCOUNTER — HOSPITAL ENCOUNTER (INPATIENT)
Age: 69
LOS: 4 days | Discharge: NURSING, REHAB OR LONG-TERM CARE | DRG: 908 | End: 2019-07-06
Attending: INTERNAL MEDICINE | Admitting: INTERNAL MEDICINE
Payer: MEDICARE

## 2019-07-01 ENCOUNTER — APPOINTMENT (OUTPATIENT)
Dept: CT IMAGING | Age: 69
DRG: 908 | End: 2019-07-01
Payer: MEDICARE

## 2019-07-01 DIAGNOSIS — G89.18 POST-OP PAIN: ICD-10-CM

## 2019-07-01 DIAGNOSIS — R20.2 PARESTHESIA: Primary | ICD-10-CM

## 2019-07-01 LAB
ALBUMIN SERPL-MCNC: 3.6 G/DL (ref 3.5–5.2)
ALP BLD-CCNC: 226 U/L (ref 35–104)
ALT SERPL-CCNC: 154 U/L (ref 0–32)
ANION GAP SERPL CALCULATED.3IONS-SCNC: 12 MMOL/L (ref 7–16)
AST SERPL-CCNC: 188 U/L (ref 0–31)
BACTERIA: ABNORMAL /HPF
BASOPHILS ABSOLUTE: 0.04 E9/L (ref 0–0.2)
BASOPHILS RELATIVE PERCENT: 0.3 % (ref 0–2)
BILIRUB SERPL-MCNC: 0.9 MG/DL (ref 0–1.2)
BILIRUBIN URINE: NEGATIVE
BLOOD, URINE: ABNORMAL
BUN BLDV-MCNC: 12 MG/DL (ref 8–23)
BURR CELLS: ABNORMAL
CALCIUM SERPL-MCNC: 9.3 MG/DL (ref 8.6–10.2)
CHLORIDE BLD-SCNC: 96 MMOL/L (ref 98–107)
CLARITY: CLEAR
CO2: 29 MMOL/L (ref 22–29)
COLOR: YELLOW
CREAT SERPL-MCNC: 0.6 MG/DL (ref 0.5–1)
EOSINOPHILS ABSOLUTE: 0.17 E9/L (ref 0.05–0.5)
EOSINOPHILS RELATIVE PERCENT: 1.2 % (ref 0–6)
GFR AFRICAN AMERICAN: >60
GFR NON-AFRICAN AMERICAN: >60 ML/MIN/1.73
GLUCOSE BLD-MCNC: 142 MG/DL (ref 74–99)
GLUCOSE URINE: 100 MG/DL
HCT VFR BLD CALC: 36.9 % (ref 34–48)
HEMOGLOBIN: 11.8 G/DL (ref 11.5–15.5)
IMMATURE GRANULOCYTES #: 0.06 E9/L
IMMATURE GRANULOCYTES %: 0.4 % (ref 0–5)
KETONES, URINE: NEGATIVE MG/DL
LEUKOCYTE ESTERASE, URINE: NEGATIVE
LYMPHOCYTES ABSOLUTE: 2.3 E9/L (ref 1.5–4)
LYMPHOCYTES RELATIVE PERCENT: 15.6 % (ref 20–42)
MCH RBC QN AUTO: 28.6 PG (ref 26–35)
MCHC RBC AUTO-ENTMCNC: 32 % (ref 32–34.5)
MCV RBC AUTO: 89.6 FL (ref 80–99.9)
MONOCYTES ABSOLUTE: 1.57 E9/L (ref 0.1–0.95)
MONOCYTES RELATIVE PERCENT: 10.6 % (ref 2–12)
NEUTROPHILS ABSOLUTE: 10.63 E9/L (ref 1.8–7.3)
NEUTROPHILS RELATIVE PERCENT: 71.9 % (ref 43–80)
NITRITE, URINE: NEGATIVE
OVALOCYTES: ABNORMAL
PDW BLD-RTO: 13.9 FL (ref 11.5–15)
PH UA: 5.5 (ref 5–9)
PLATELET # BLD: 248 E9/L (ref 130–450)
PMV BLD AUTO: 10 FL (ref 7–12)
POIKILOCYTES: ABNORMAL
POTASSIUM REFLEX MAGNESIUM: 3.9 MMOL/L (ref 3.5–5)
PROTEIN UA: NEGATIVE MG/DL
RBC # BLD: 4.12 E12/L (ref 3.5–5.5)
RBC UA: ABNORMAL /HPF (ref 0–2)
SODIUM BLD-SCNC: 137 MMOL/L (ref 132–146)
SPECIFIC GRAVITY UA: 1.01 (ref 1–1.03)
TOTAL PROTEIN: 7.2 G/DL (ref 6.4–8.3)
UROBILINOGEN, URINE: 0.2 E.U./DL
WBC # BLD: 14.8 E9/L (ref 4.5–11.5)
WBC UA: ABNORMAL /HPF (ref 0–5)

## 2019-07-01 PROCEDURE — 81001 URINALYSIS AUTO W/SCOPE: CPT

## 2019-07-01 PROCEDURE — 99285 EMERGENCY DEPT VISIT HI MDM: CPT

## 2019-07-01 PROCEDURE — 72127 CT NECK SPINE W/O & W/DYE: CPT

## 2019-07-01 PROCEDURE — 87088 URINE BACTERIA CULTURE: CPT

## 2019-07-01 PROCEDURE — 80053 COMPREHEN METABOLIC PANEL: CPT

## 2019-07-01 PROCEDURE — 85025 COMPLETE CBC W/AUTO DIFF WBC: CPT

## 2019-07-01 ASSESSMENT — ENCOUNTER SYMPTOMS
DIARRHEA: 0
VOMITING: 0
PHOTOPHOBIA: 0
SHORTNESS OF BREATH: 0
NAUSEA: 0
ABDOMINAL PAIN: 0
BACK PAIN: 0
CHEST TIGHTNESS: 0
ABDOMINAL DISTENTION: 0

## 2019-07-01 NOTE — ED NOTES
Pt son arrived to take pt back. Pt was helped into car by this nurse. All follow up care and finding reviewed with the son.       Boy Laughlin RN  06/30/19 2013

## 2019-07-02 PROBLEM — R20.2 PARESTHESIA AND PAIN OF BOTH UPPER EXTREMITIES: Status: RESOLVED | Noted: 2019-07-02 | Resolved: 2019-07-02

## 2019-07-02 PROBLEM — M79.601 PARESTHESIA AND PAIN OF BOTH UPPER EXTREMITIES: Status: ACTIVE | Noted: 2019-07-02

## 2019-07-02 PROBLEM — M79.601 PARESTHESIA AND PAIN OF BOTH UPPER EXTREMITIES: Status: RESOLVED | Noted: 2019-07-02 | Resolved: 2019-07-02

## 2019-07-02 PROBLEM — M54.2 NECK PAIN: Status: RESOLVED | Noted: 2017-08-23 | Resolved: 2019-07-02

## 2019-07-02 PROBLEM — M79.602 PARESTHESIA AND PAIN OF BOTH UPPER EXTREMITIES: Status: RESOLVED | Noted: 2019-07-02 | Resolved: 2019-07-02

## 2019-07-02 PROBLEM — M79.602 PARESTHESIA AND PAIN OF BOTH UPPER EXTREMITIES: Status: ACTIVE | Noted: 2019-07-02

## 2019-07-02 PROBLEM — I10 ESSENTIAL HYPERTENSION: Chronic | Status: ACTIVE | Noted: 2019-07-02

## 2019-07-02 PROBLEM — R20.2 PARESTHESIA AND PAIN OF BOTH UPPER EXTREMITIES: Status: ACTIVE | Noted: 2019-07-02

## 2019-07-02 PROCEDURE — 6360000002 HC RX W HCPCS: Performed by: INTERNAL MEDICINE

## 2019-07-02 PROCEDURE — 6370000000 HC RX 637 (ALT 250 FOR IP): Performed by: INTERNAL MEDICINE

## 2019-07-02 PROCEDURE — 2580000003 HC RX 258: Performed by: RADIOLOGY

## 2019-07-02 PROCEDURE — 51798 US URINE CAPACITY MEASURE: CPT

## 2019-07-02 PROCEDURE — 6370000000 HC RX 637 (ALT 250 FOR IP): Performed by: STUDENT IN AN ORGANIZED HEALTH CARE EDUCATION/TRAINING PROGRAM

## 2019-07-02 PROCEDURE — 6360000004 HC RX CONTRAST MEDICATION: Performed by: RADIOLOGY

## 2019-07-02 PROCEDURE — 97530 THERAPEUTIC ACTIVITIES: CPT | Performed by: PHYSICAL THERAPIST

## 2019-07-02 PROCEDURE — 1200000000 HC SEMI PRIVATE

## 2019-07-02 PROCEDURE — 97162 PT EVAL MOD COMPLEX 30 MIN: CPT | Performed by: PHYSICAL THERAPIST

## 2019-07-02 PROCEDURE — 2580000003 HC RX 258: Performed by: INTERNAL MEDICINE

## 2019-07-02 PROCEDURE — 97165 OT EVAL LOW COMPLEX 30 MIN: CPT

## 2019-07-02 PROCEDURE — 51702 INSERT TEMP BLADDER CATH: CPT

## 2019-07-02 PROCEDURE — 97530 THERAPEUTIC ACTIVITIES: CPT

## 2019-07-02 RX ORDER — AMLODIPINE BESYLATE 5 MG/1
5 TABLET ORAL DAILY
Status: DISCONTINUED | OUTPATIENT
Start: 2019-07-02 | End: 2019-07-06 | Stop reason: HOSPADM

## 2019-07-02 RX ORDER — SODIUM CHLORIDE 0.9 % (FLUSH) 0.9 %
10 SYRINGE (ML) INJECTION PRN
Status: COMPLETED | OUTPATIENT
Start: 2019-07-02 | End: 2019-07-02

## 2019-07-02 RX ORDER — SODIUM CHLORIDE 0.9 % (FLUSH) 0.9 %
10 SYRINGE (ML) INJECTION PRN
Status: DISCONTINUED | OUTPATIENT
Start: 2019-07-02 | End: 2019-07-06 | Stop reason: HOSPADM

## 2019-07-02 RX ORDER — OXYCODONE HYDROCHLORIDE AND ACETAMINOPHEN 5; 325 MG/1; MG/1
1 TABLET ORAL ONCE
Status: COMPLETED | OUTPATIENT
Start: 2019-07-02 | End: 2019-07-02

## 2019-07-02 RX ORDER — FLUTICASONE PROPIONATE 50 MCG
1 SPRAY, SUSPENSION (ML) NASAL DAILY
Status: DISCONTINUED | OUTPATIENT
Start: 2019-07-02 | End: 2019-07-06 | Stop reason: HOSPADM

## 2019-07-02 RX ORDER — ESCITALOPRAM OXALATE 10 MG/1
20 TABLET ORAL DAILY
Status: DISCONTINUED | OUTPATIENT
Start: 2019-07-02 | End: 2019-07-06 | Stop reason: HOSPADM

## 2019-07-02 RX ORDER — MORPHINE SULFATE 2 MG/ML
2 INJECTION, SOLUTION INTRAMUSCULAR; INTRAVENOUS EVERY 4 HOURS PRN
Status: DISCONTINUED | OUTPATIENT
Start: 2019-07-02 | End: 2019-07-03

## 2019-07-02 RX ORDER — OXYCODONE HYDROCHLORIDE 5 MG/1
5 TABLET ORAL EVERY 4 HOURS PRN
Status: DISCONTINUED | OUTPATIENT
Start: 2019-07-02 | End: 2019-07-03

## 2019-07-02 RX ORDER — UBIDECARENONE 75 MG
50 CAPSULE ORAL DAILY
Status: DISCONTINUED | OUTPATIENT
Start: 2019-07-02 | End: 2019-07-06 | Stop reason: HOSPADM

## 2019-07-02 RX ORDER — ONDANSETRON 2 MG/ML
4 INJECTION INTRAMUSCULAR; INTRAVENOUS EVERY 6 HOURS PRN
Status: DISCONTINUED | OUTPATIENT
Start: 2019-07-02 | End: 2019-07-03 | Stop reason: SDUPTHER

## 2019-07-02 RX ORDER — M-VIT,TX,IRON,MINS/CALC/FOLIC 27MG-0.4MG
1 TABLET ORAL DAILY
Status: DISCONTINUED | OUTPATIENT
Start: 2019-07-02 | End: 2019-07-06 | Stop reason: HOSPADM

## 2019-07-02 RX ORDER — SODIUM CHLORIDE 0.9 % (FLUSH) 0.9 %
10 SYRINGE (ML) INJECTION EVERY 12 HOURS SCHEDULED
Status: DISCONTINUED | OUTPATIENT
Start: 2019-07-02 | End: 2019-07-06 | Stop reason: HOSPADM

## 2019-07-02 RX ORDER — CYCLOBENZAPRINE HCL 10 MG
10 TABLET ORAL 3 TIMES DAILY PRN
Status: DISCONTINUED | OUTPATIENT
Start: 2019-07-02 | End: 2019-07-06 | Stop reason: HOSPADM

## 2019-07-02 RX ADMIN — MULTIPLE VITAMINS W/ MINERALS TAB 1 TABLET: TAB at 08:51

## 2019-07-02 RX ADMIN — OXYCODONE HYDROCHLORIDE AND ACETAMINOPHEN 1 TABLET: 5; 325 TABLET ORAL at 04:54

## 2019-07-02 RX ADMIN — OXYCODONE HYDROCHLORIDE 5 MG: 5 TABLET ORAL at 11:09

## 2019-07-02 RX ADMIN — AMLODIPINE BESYLATE 5 MG: 5 TABLET ORAL at 08:52

## 2019-07-02 RX ADMIN — ESCITALOPRAM OXALATE 20 MG: 10 TABLET, FILM COATED ORAL at 08:52

## 2019-07-02 RX ADMIN — VITAM B12 50 MCG: 100 TAB at 08:51

## 2019-07-02 RX ADMIN — Medication 10 ML: at 00:36

## 2019-07-02 RX ADMIN — ENOXAPARIN SODIUM 40 MG: 40 INJECTION SUBCUTANEOUS at 08:52

## 2019-07-02 RX ADMIN — FLUTICASONE PROPIONATE 1 SPRAY: 50 SPRAY, METERED NASAL at 08:51

## 2019-07-02 RX ADMIN — Medication 10 ML: at 08:52

## 2019-07-02 RX ADMIN — CYCLOBENZAPRINE 10 MG: 10 TABLET, FILM COATED ORAL at 08:52

## 2019-07-02 RX ADMIN — IOPAMIDOL 90 ML: 755 INJECTION, SOLUTION INTRAVENOUS at 00:35

## 2019-07-02 RX ADMIN — Medication 10 ML: at 21:01

## 2019-07-02 RX ADMIN — OXYCODONE HYDROCHLORIDE 5 MG: 5 TABLET ORAL at 17:44

## 2019-07-02 ASSESSMENT — PAIN SCALES - GENERAL
PAINLEVEL_OUTOF10: 4
PAINLEVEL_OUTOF10: 7
PAINLEVEL_OUTOF10: 5
PAINLEVEL_OUTOF10: 0
PAINLEVEL_OUTOF10: 9
PAINLEVEL_OUTOF10: 7
PAINLEVEL_OUTOF10: 4
PAINLEVEL_OUTOF10: 5
PAINLEVEL_OUTOF10: 8

## 2019-07-02 ASSESSMENT — PAIN DESCRIPTION - PAIN TYPE: TYPE: ACUTE PAIN

## 2019-07-02 ASSESSMENT — PAIN DESCRIPTION - LOCATION: LOCATION: BACK

## 2019-07-02 ASSESSMENT — PAIN DESCRIPTION - DESCRIPTORS: DESCRIPTORS: ACHING;BURNING;DISCOMFORT

## 2019-07-02 NOTE — H&P
daily  vitamin B-12 (CYANOCOBALAMIN) 100 MCG tablet, Take 50 mcg by mouth daily    Allergies:    Sulfa antibiotics    Social History:    reports that she has never smoked. She has never used smokeless tobacco. She reports that she does not drink alcohol or use drugs. Family History:   family history is not on file. REVIEW OF SYSTEMS:  As above in the HPI, otherwise negative    PHYSICAL EXAM:    Vitals:  BP (!) 143/64   Pulse 87   Temp 98 °F (36.7 °C) (Temporal)   Resp 18   Ht 5' 4\" (1.626 m)   Wt 186 lb (84.4 kg)   SpO2 94%   BMI 31.93 kg/m²     General:  Awake, alert, oriented X 3. Well developed, well nourished, well groomed. No apparent distress. HEENT:  Normocephalic, atraumatic. Pupils equal, round, reactive to light. No scleral icterus. No conjunctival injection. Normal lips, teeth, and gums. No nasal discharge. Neck:  Supple  Heart:  RRR, no murmurs, gallops, rubs  Lungs:  CTA bilaterally, bilat symmetrical expansion, no wheeze, rales, or rhonchi  Abdomen:   Bowel sounds present, soft, nontender, no masses, no organomegaly, no peritoneal signs  Extremities:  No clubbing, cyanosis, or edema  Skin:  Warm and dry, no open lesions or rash  Neuro:  Cranial nerves 2-12 intact, no focal deficits  Breast: deferred  Rectal: deferred  Genitalia:  deferred    LABS:    CBC with Differential:    Lab Results   Component Value Date    WBC 14.8 07/01/2019    RBC 4.12 07/01/2019    HGB 11.8 07/01/2019    HCT 36.9 07/01/2019     07/01/2019    MCV 89.6 07/01/2019    MCH 28.6 07/01/2019    MCHC 32.0 07/01/2019    RDW 13.9 07/01/2019    LYMPHOPCT 15.6 07/01/2019    MONOPCT 10.6 07/01/2019    BASOPCT 0.3 07/01/2019    MONOSABS 1.57 07/01/2019    LYMPHSABS 2.30 07/01/2019    EOSABS 0.17 07/01/2019    BASOSABS 0.04 07/01/2019     CMP:    Lab Results   Component Value Date     07/01/2019    K 3.9 07/01/2019    CL 96 07/01/2019    CO2 29 07/01/2019    BUN 12 07/01/2019    CREATININE 0.6 07/01/2019

## 2019-07-02 NOTE — ED NOTES
This RN and a 2nd RN assisted pt to restroom. Pt having difficulties transferring as well as bearing weight. Knees buckled multiple times during transfer.  Dr. Martin Cool made aware and pt advised of necessity to remain in bed unless have 2 person assist.      Raciel Greenwood RN  07/02/19 1836

## 2019-07-02 NOTE — PROGRESS NOTES
Glasses: yes                UE Assessment:  Hand Dominance: Right [x]  Left []   Strength ROM Additional Info:    RUE  Not formally tested proximally d/t pain and cervical precautions, unable to move through full ROM shoulder flexion and elbow flexion. 3+/5 distally. WFL with exception of shoulder flexion to approx 50* fair  and wfl FMC/dexterity noted during ADL tasks   LUE Not formally tested proximally d/t pain and cervical precautions, unable to move through full ROM shoulder flexion and elbow flexion. 3/5 distally. WFL with exception of shoulder flexion limited to approx 30*, elbow flexion limited poor  and wfl FMC/dexterity noted during ADL tasks     Hearing: [facility]  Sensation:  Pt with c/o numbness, light touch intact. Pt reports that it \"feels like she is holding something in her hands even when she isn't. \"  Tone: WNL UE, high tone toned in RLE  Edema: none noted                            Comments/Treatment: OK from RN to see patient. Upon arrival, patient sitting up in chair with friend present. OT eval completed. Therapist facilitated: sitting at edge of chair for 5 minutes to increase dynamic sitting balance and activity tolerance, transfer training with verbal cues for hand placement. Pt unable to use arms to push up from chair due to weakness, attempted 3 sit to stands, pt unable to achieve. Pt educated on cervical precautions. Pt sitting in chair at end of eval, pt positioned with pillows to support arms. Call light and phone in reach. All lines and tubes intact. Pt would benefit from continued skilled OT to increase safety and independence with completion of ADL/IADL tasks for functional independence and quality of life.     Eval Complexity: Low    Assessment of current deficits:  Functional mobility [x]  ADLs [x] Strength [x]  Cognition []  Functional transfers  [x] IADLs [x] Safety Awareness [x]  Endurance [x]  Fine Motor Coordination [] Balance [x] Vision/perception [] Sensation [x]   Gross Motor Coordination [] ROM [x] Delirium []                  Motor Control []    Plan of Care:   ADL retraining [x]   Equipment needs [x]   Neuromuscular re-education [x] Energy Conservation Techniques [x]  Functional Transfer training [x] Patient and/or Family Education [x]  Functional Mobility training [x]  Environmental Modifications [x]  Cognitive re-training []   Compensatory techniques for ADLs [x]  Splinting Needs []   Positioning to improve overall function [x]   Therapeutic Activity [x]  Therapeutic Exercise  [x]  Visual/Perceptual: []    Delirium prevention/treatment  []   Other:  []    Rehab Potential: Good for established goals     Patient / Family Goal: None stated     Patient and/or family were instructed diagnosis, prognosis/goals and plan of care. Demonstrated fair understanding.     Tx Time in: 1200  Tx Time out: 1220  Evaluation + 20 treatment time    Joana Jett, OTR/L [id number]

## 2019-07-02 NOTE — ED PROVIDER NOTES
Conjunctivae are normal. Right eye exhibits no discharge. Left eye exhibits no discharge. Neck: Neck supple. Cardiovascular: Normal rate and regular rhythm. Pulmonary/Chest: Effort normal and breath sounds normal. No respiratory distress. She has no wheezes. Abdominal: Soft. Bowel sounds are normal. She exhibits no distension. There is no tenderness. There is no rebound and no guarding. Musculoskeletal: She exhibits no edema or tenderness. 5/5 strength UE/LE  UE ROM limited due to chronic shoulder pain   Neurological: She is alert and oriented to person, place, and time. No cranial nerve deficit or sensory deficit. Coordination normal.   No focal neurological deficits    Skin: She is not diaphoretic. Psychiatric: She has a normal mood and affect. Nursing note and vitals reviewed. Procedures    MDM  Number of Diagnoses or Management Options  Paresthesia:   Post-op pain:   Diagnosis management comments: Audra Adams presented with numbness and tingling down both arms. CT C-spine showed fluid collection. Discussed patient with neurosurgery, fluid collection observed on CT is normal post surgical finding per neurosurgery. Patient is unable to ambulate while in ED, patient unsteady. Patient also experiencing increasing pain. Patient will be admitted for management of post surgical pain and placement to facility. Patient is currently admitted for rehab. Patient is unable to ambulate in ED. Discussed results and plan with patient she is agreeable to plan.                  --------------------------------------------- PAST HISTORY ---------------------------------------------  Past Medical History:  has a past medical history of Anxiety, Depression, and Osteoarthritis. Past Surgical History:  has a past surgical history that includes joint replacement; cyst removal; Tonsillectomy and adenoidectomy; Total hip arthroplasty (Right, 2015); and cervical fusion (N/A, 6/27/2019).     Social History:  reports 0 - 31 U/L   Urinalysis with Microscopic   Result Value Ref Range    Color, UA Yellow Straw/Yellow    Clarity, UA Clear Clear    Glucose, Ur 100 (A) Negative mg/dL    Bilirubin Urine Negative Negative    Ketones, Urine Negative Negative mg/dL    Specific Gravity, UA 1.010 1.005 - 1.030    Blood, Urine TRACE-INTACT Negative    pH, UA 5.5 5.0 - 9.0    Protein, UA Negative Negative mg/dL    Urobilinogen, Urine 0.2 <2.0 E.U./dL    Nitrite, Urine Negative Negative    Leukocyte Esterase, Urine Negative Negative    WBC, UA NONE 0 - 5 /HPF    RBC, UA 0-1 0 - 2 /HPF    Bacteria, UA NONE /HPF       RADIOLOGY:  CT CERVICAL SPINE W WO CONTRAST   Final Result   1.  3 x 2 x 6.5 cm fluid collection in the soft tissues of the operative bed in    the mid cervical spine extending superiorly into the soft tissues of the upper    neck. This is likely related to recent surgery. A definite abscess is not    identified. This fluid collection could be causing some mass effect upon the    posterior spinal canal.   2.  Degenerative changes cervical spine as described. 3.  Posterior cervical fusion from C3-C6 with laminectomies at these levels. 4.  There is extensive artifact in the mid cervical spine. There appears to be    some narrowing of the spinal canal from C4-C6 however evaluation is limited on    this exam.   5.  Moderate neural foraminal stenosis in the mid cervical spine bilaterally. This is secondary to bony hypertrophy. This report has been electronically signed by Eric Pederson MD.                ------------------------- NURSING NOTES AND VITALS REVIEWED ---------------------------  Date / Time Roomed:  7/1/2019 10:02 PM  ED Bed Assignment:  17A/17A-17    The nursing notes within the ED encounter and vital signs as below have been reviewed.      Patient Vitals for the past 24 hrs:   BP Temp Temp src Pulse Resp SpO2 Height Weight   07/02/19 0400 -- -- -- 87 22 -- -- --   07/02/19 0200 (!) 155/105 -- -- 95 23 95 % --

## 2019-07-03 ENCOUNTER — ANESTHESIA (OUTPATIENT)
Dept: OPERATING ROOM | Age: 69
DRG: 908 | End: 2019-07-03
Payer: MEDICARE

## 2019-07-03 ENCOUNTER — ANESTHESIA EVENT (OUTPATIENT)
Dept: OPERATING ROOM | Age: 69
DRG: 908 | End: 2019-07-03
Payer: MEDICARE

## 2019-07-03 VITALS
OXYGEN SATURATION: 100 % | SYSTOLIC BLOOD PRESSURE: 160 MMHG | DIASTOLIC BLOOD PRESSURE: 109 MMHG | RESPIRATION RATE: 12 BRPM

## 2019-07-03 LAB
ANION GAP SERPL CALCULATED.3IONS-SCNC: 14 MMOL/L (ref 7–16)
BASOPHILS ABSOLUTE: 0.06 E9/L (ref 0–0.2)
BASOPHILS RELATIVE PERCENT: 0.5 % (ref 0–2)
BUN BLDV-MCNC: 10 MG/DL (ref 8–23)
CALCIUM SERPL-MCNC: 9.6 MG/DL (ref 8.6–10.2)
CHLORIDE BLD-SCNC: 97 MMOL/L (ref 98–107)
CO2: 27 MMOL/L (ref 22–29)
CREAT SERPL-MCNC: 0.6 MG/DL (ref 0.5–1)
EOSINOPHILS ABSOLUTE: 0.2 E9/L (ref 0.05–0.5)
EOSINOPHILS RELATIVE PERCENT: 1.8 % (ref 0–6)
GFR AFRICAN AMERICAN: >60
GFR NON-AFRICAN AMERICAN: >60 ML/MIN/1.73
GLUCOSE BLD-MCNC: 137 MG/DL (ref 74–99)
HCT VFR BLD CALC: 35.2 % (ref 34–48)
HEMOGLOBIN: 11.1 G/DL (ref 11.5–15.5)
IMMATURE GRANULOCYTES #: 0.05 E9/L
IMMATURE GRANULOCYTES %: 0.5 % (ref 0–5)
LYMPHOCYTES ABSOLUTE: 2.74 E9/L (ref 1.5–4)
LYMPHOCYTES RELATIVE PERCENT: 24.7 % (ref 20–42)
MCH RBC QN AUTO: 28.9 PG (ref 26–35)
MCHC RBC AUTO-ENTMCNC: 31.5 % (ref 32–34.5)
MCV RBC AUTO: 91.7 FL (ref 80–99.9)
MONOCYTES ABSOLUTE: 1.24 E9/L (ref 0.1–0.95)
MONOCYTES RELATIVE PERCENT: 11.2 % (ref 2–12)
NEUTROPHILS ABSOLUTE: 6.79 E9/L (ref 1.8–7.3)
NEUTROPHILS RELATIVE PERCENT: 61.3 % (ref 43–80)
PDW BLD-RTO: 14.1 FL (ref 11.5–15)
PLATELET # BLD: 253 E9/L (ref 130–450)
PMV BLD AUTO: 10 FL (ref 7–12)
POTASSIUM REFLEX MAGNESIUM: 3.7 MMOL/L (ref 3.5–5)
RBC # BLD: 3.84 E12/L (ref 3.5–5.5)
SODIUM BLD-SCNC: 138 MMOL/L (ref 132–146)
WBC # BLD: 11.1 E9/L (ref 4.5–11.5)

## 2019-07-03 PROCEDURE — 85025 COMPLETE CBC W/AUTO DIFF WBC: CPT

## 2019-07-03 PROCEDURE — 87205 SMEAR GRAM STAIN: CPT

## 2019-07-03 PROCEDURE — 36415 COLL VENOUS BLD VENIPUNCTURE: CPT

## 2019-07-03 PROCEDURE — 6370000000 HC RX 637 (ALT 250 FOR IP): Performed by: PHYSICIAN ASSISTANT

## 2019-07-03 PROCEDURE — 2709999900 HC NON-CHARGEABLE SUPPLY: Performed by: NEUROLOGICAL SURGERY

## 2019-07-03 PROCEDURE — 7100000000 HC PACU RECOVERY - FIRST 15 MIN: Performed by: NEUROLOGICAL SURGERY

## 2019-07-03 PROCEDURE — 6360000002 HC RX W HCPCS: Performed by: NEUROLOGICAL SURGERY

## 2019-07-03 PROCEDURE — 3600000014 HC SURGERY LEVEL 4 ADDTL 15MIN: Performed by: NEUROLOGICAL SURGERY

## 2019-07-03 PROCEDURE — 2580000003 HC RX 258: Performed by: NEUROLOGICAL SURGERY

## 2019-07-03 PROCEDURE — 6360000002 HC RX W HCPCS: Performed by: INTERNAL MEDICINE

## 2019-07-03 PROCEDURE — 87102 FUNGUS ISOLATION CULTURE: CPT

## 2019-07-03 PROCEDURE — 6370000000 HC RX 637 (ALT 250 FOR IP): Performed by: INTERNAL MEDICINE

## 2019-07-03 PROCEDURE — 3700000001 HC ADD 15 MINUTES (ANESTHESIA): Performed by: NEUROLOGICAL SURGERY

## 2019-07-03 PROCEDURE — 87075 CULTR BACTERIA EXCEPT BLOOD: CPT

## 2019-07-03 PROCEDURE — 87116 MYCOBACTERIA CULTURE: CPT

## 2019-07-03 PROCEDURE — 7100000001 HC PACU RECOVERY - ADDTL 15 MIN: Performed by: NEUROLOGICAL SURGERY

## 2019-07-03 PROCEDURE — 97530 THERAPEUTIC ACTIVITIES: CPT

## 2019-07-03 PROCEDURE — 10180 I&D COMPLEX PO WOUND INFCTJ: CPT | Performed by: NEUROLOGICAL SURGERY

## 2019-07-03 PROCEDURE — 97110 THERAPEUTIC EXERCISES: CPT

## 2019-07-03 PROCEDURE — 6360000002 HC RX W HCPCS: Performed by: NURSE ANESTHETIST, CERTIFIED REGISTERED

## 2019-07-03 PROCEDURE — 6370000000 HC RX 637 (ALT 250 FOR IP): Performed by: NEUROLOGICAL SURGERY

## 2019-07-03 PROCEDURE — 87015 SPECIMEN INFECT AGNT CONCNTJ: CPT

## 2019-07-03 PROCEDURE — 97535 SELF CARE MNGMENT TRAINING: CPT

## 2019-07-03 PROCEDURE — 80048 BASIC METABOLIC PNL TOTAL CA: CPT

## 2019-07-03 PROCEDURE — 2580000003 HC RX 258: Performed by: INTERNAL MEDICINE

## 2019-07-03 PROCEDURE — 3600000004 HC SURGERY LEVEL 4 BASE: Performed by: NEUROLOGICAL SURGERY

## 2019-07-03 PROCEDURE — 1200000000 HC SEMI PRIVATE

## 2019-07-03 PROCEDURE — 3700000000 HC ANESTHESIA ATTENDED CARE: Performed by: NEUROLOGICAL SURGERY

## 2019-07-03 PROCEDURE — 87206 SMEAR FLUORESCENT/ACID STAI: CPT

## 2019-07-03 PROCEDURE — 2580000003 HC RX 258: Performed by: PHYSICIAN ASSISTANT

## 2019-07-03 PROCEDURE — 87070 CULTURE OTHR SPECIMN AEROBIC: CPT

## 2019-07-03 PROCEDURE — 00CU0ZZ EXTIRPATION OF MATTER FROM SPINAL CANAL, OPEN APPROACH: ICD-10-PCS | Performed by: NEUROLOGICAL SURGERY

## 2019-07-03 PROCEDURE — 2580000003 HC RX 258: Performed by: NURSE ANESTHETIST, CERTIFIED REGISTERED

## 2019-07-03 PROCEDURE — 2500000003 HC RX 250 WO HCPCS: Performed by: NURSE ANESTHETIST, CERTIFIED REGISTERED

## 2019-07-03 RX ORDER — ONDANSETRON 2 MG/ML
4 INJECTION INTRAMUSCULAR; INTRAVENOUS EVERY 6 HOURS PRN
Status: DISCONTINUED | OUTPATIENT
Start: 2019-07-03 | End: 2019-07-06 | Stop reason: HOSPADM

## 2019-07-03 RX ORDER — GLYCOPYRROLATE 1 MG/5 ML
SYRINGE (ML) INTRAVENOUS PRN
Status: DISCONTINUED | OUTPATIENT
Start: 2019-07-03 | End: 2019-07-03 | Stop reason: SDUPTHER

## 2019-07-03 RX ORDER — DIAPER,BRIEF,INFANT-TODD,DISP
EACH MISCELLANEOUS PRN
Status: DISCONTINUED | OUTPATIENT
Start: 2019-07-03 | End: 2019-07-03 | Stop reason: ALTCHOICE

## 2019-07-03 RX ORDER — OXYCODONE HYDROCHLORIDE 5 MG/1
5 TABLET ORAL EVERY 4 HOURS PRN
Status: DISCONTINUED | OUTPATIENT
Start: 2019-07-03 | End: 2019-07-06 | Stop reason: HOSPADM

## 2019-07-03 RX ORDER — CEFAZOLIN SODIUM 1 G/3ML
INJECTION, POWDER, FOR SOLUTION INTRAMUSCULAR; INTRAVENOUS PRN
Status: DISCONTINUED | OUTPATIENT
Start: 2019-07-03 | End: 2019-07-03 | Stop reason: SDUPTHER

## 2019-07-03 RX ORDER — TIZANIDINE 4 MG/1
4 TABLET ORAL EVERY 6 HOURS PRN
Status: DISCONTINUED | OUTPATIENT
Start: 2019-07-03 | End: 2019-07-06 | Stop reason: HOSPADM

## 2019-07-03 RX ORDER — PROPOFOL 10 MG/ML
INJECTION, EMULSION INTRAVENOUS PRN
Status: DISCONTINUED | OUTPATIENT
Start: 2019-07-03 | End: 2019-07-03 | Stop reason: SDUPTHER

## 2019-07-03 RX ORDER — MORPHINE SULFATE 2 MG/ML
1 INJECTION, SOLUTION INTRAMUSCULAR; INTRAVENOUS EVERY 5 MIN PRN
Status: DISCONTINUED | OUTPATIENT
Start: 2019-07-03 | End: 2019-07-03 | Stop reason: HOSPADM

## 2019-07-03 RX ORDER — VANCOMYCIN HYDROCHLORIDE 500 MG/10ML
INJECTION, POWDER, LYOPHILIZED, FOR SOLUTION INTRAVENOUS PRN
Status: DISCONTINUED | OUTPATIENT
Start: 2019-07-03 | End: 2019-07-03 | Stop reason: ALTCHOICE

## 2019-07-03 RX ORDER — SODIUM CHLORIDE 0.9 % (FLUSH) 0.9 %
10 SYRINGE (ML) INJECTION EVERY 12 HOURS SCHEDULED
Status: DISCONTINUED | OUTPATIENT
Start: 2019-07-03 | End: 2019-07-06 | Stop reason: HOSPADM

## 2019-07-03 RX ORDER — DOCUSATE SODIUM 100 MG/1
100 CAPSULE, LIQUID FILLED ORAL 2 TIMES DAILY
Status: DISCONTINUED | OUTPATIENT
Start: 2019-07-03 | End: 2019-07-06 | Stop reason: HOSPADM

## 2019-07-03 RX ORDER — OXYCODONE HYDROCHLORIDE 10 MG/1
10 TABLET ORAL EVERY 4 HOURS PRN
Status: DISCONTINUED | OUTPATIENT
Start: 2019-07-03 | End: 2019-07-06 | Stop reason: HOSPADM

## 2019-07-03 RX ORDER — ONDANSETRON 2 MG/ML
INJECTION INTRAMUSCULAR; INTRAVENOUS PRN
Status: DISCONTINUED | OUTPATIENT
Start: 2019-07-03 | End: 2019-07-03 | Stop reason: SDUPTHER

## 2019-07-03 RX ORDER — NEOSTIGMINE METHYLSULFATE 1 MG/ML
INJECTION, SOLUTION INTRAVENOUS PRN
Status: DISCONTINUED | OUTPATIENT
Start: 2019-07-03 | End: 2019-07-03 | Stop reason: SDUPTHER

## 2019-07-03 RX ORDER — DEXAMETHASONE SODIUM PHOSPHATE 10 MG/ML
INJECTION INTRAMUSCULAR; INTRAVENOUS PRN
Status: DISCONTINUED | OUTPATIENT
Start: 2019-07-03 | End: 2019-07-03 | Stop reason: SDUPTHER

## 2019-07-03 RX ORDER — PHENYLEPHRINE HYDROCHLORIDE 10 MG/ML
INJECTION INTRAVENOUS PRN
Status: DISCONTINUED | OUTPATIENT
Start: 2019-07-03 | End: 2019-07-03 | Stop reason: SDUPTHER

## 2019-07-03 RX ORDER — SODIUM CHLORIDE 0.9 % (FLUSH) 0.9 %
10 SYRINGE (ML) INJECTION PRN
Status: DISCONTINUED | OUTPATIENT
Start: 2019-07-03 | End: 2019-07-06 | Stop reason: HOSPADM

## 2019-07-03 RX ORDER — SODIUM CHLORIDE, SODIUM LACTATE, POTASSIUM CHLORIDE, CALCIUM CHLORIDE 600; 310; 30; 20 MG/100ML; MG/100ML; MG/100ML; MG/100ML
INJECTION, SOLUTION INTRAVENOUS CONTINUOUS PRN
Status: DISCONTINUED | OUTPATIENT
Start: 2019-07-03 | End: 2019-07-03 | Stop reason: SDUPTHER

## 2019-07-03 RX ORDER — ROCURONIUM BROMIDE 10 MG/ML
INJECTION, SOLUTION INTRAVENOUS PRN
Status: DISCONTINUED | OUTPATIENT
Start: 2019-07-03 | End: 2019-07-03 | Stop reason: SDUPTHER

## 2019-07-03 RX ORDER — FENTANYL CITRATE 50 UG/ML
INJECTION, SOLUTION INTRAMUSCULAR; INTRAVENOUS PRN
Status: DISCONTINUED | OUTPATIENT
Start: 2019-07-03 | End: 2019-07-03 | Stop reason: SDUPTHER

## 2019-07-03 RX ORDER — SODIUM CHLORIDE 9 MG/ML
INJECTION, SOLUTION INTRAVENOUS CONTINUOUS
Status: DISCONTINUED | OUTPATIENT
Start: 2019-07-03 | End: 2019-07-06 | Stop reason: HOSPADM

## 2019-07-03 RX ORDER — MORPHINE SULFATE 2 MG/ML
2 INJECTION, SOLUTION INTRAMUSCULAR; INTRAVENOUS EVERY 5 MIN PRN
Status: DISCONTINUED | OUTPATIENT
Start: 2019-07-03 | End: 2019-07-03 | Stop reason: HOSPADM

## 2019-07-03 RX ORDER — LIDOCAINE HYDROCHLORIDE 20 MG/ML
INJECTION, SOLUTION INTRAVENOUS PRN
Status: DISCONTINUED | OUTPATIENT
Start: 2019-07-03 | End: 2019-07-03 | Stop reason: SDUPTHER

## 2019-07-03 RX ADMIN — FLUTICASONE PROPIONATE 1 SPRAY: 50 SPRAY, METERED NASAL at 09:26

## 2019-07-03 RX ADMIN — Medication 3 MG: at 19:17

## 2019-07-03 RX ADMIN — DOCUSATE SODIUM 100 MG: 100 CAPSULE, LIQUID FILLED ORAL at 22:29

## 2019-07-03 RX ADMIN — LIDOCAINE HYDROCHLORIDE 40 MG: 20 INJECTION, SOLUTION INTRAVENOUS at 17:58

## 2019-07-03 RX ADMIN — AMLODIPINE BESYLATE 5 MG: 5 TABLET ORAL at 09:26

## 2019-07-03 RX ADMIN — CEFAZOLIN 2000 MG: 1 INJECTION, POWDER, FOR SOLUTION INTRAMUSCULAR; INTRAVENOUS at 18:13

## 2019-07-03 RX ADMIN — CEFAZOLIN SODIUM 2 G: 10 INJECTION, POWDER, FOR SOLUTION INTRAVENOUS at 22:29

## 2019-07-03 RX ADMIN — PHENYLEPHRINE HYDROCHLORIDE 100 MCG: 10 INJECTION INTRAVENOUS at 18:59

## 2019-07-03 RX ADMIN — SODIUM CHLORIDE: 9 INJECTION, SOLUTION INTRAVENOUS at 22:31

## 2019-07-03 RX ADMIN — ESCITALOPRAM OXALATE 20 MG: 10 TABLET, FILM COATED ORAL at 09:26

## 2019-07-03 RX ADMIN — SODIUM CHLORIDE, POTASSIUM CHLORIDE, SODIUM LACTATE AND CALCIUM CHLORIDE: 600; 310; 30; 20 INJECTION, SOLUTION INTRAVENOUS at 18:43

## 2019-07-03 RX ADMIN — DEXAMETHASONE SODIUM PHOSPHATE 10 MG: 10 INJECTION INTRAMUSCULAR; INTRAVENOUS at 18:15

## 2019-07-03 RX ADMIN — VITAM B12 50 MCG: 100 TAB at 09:25

## 2019-07-03 RX ADMIN — Medication 10 ML: at 22:29

## 2019-07-03 RX ADMIN — ONDANSETRON HYDROCHLORIDE 4 MG: 2 INJECTION, SOLUTION INTRAMUSCULAR; INTRAVENOUS at 18:48

## 2019-07-03 RX ADMIN — CYCLOBENZAPRINE 10 MG: 10 TABLET, FILM COATED ORAL at 09:25

## 2019-07-03 RX ADMIN — Medication 10 ML: at 09:26

## 2019-07-03 RX ADMIN — FENTANYL CITRATE 50 MCG: 50 INJECTION, SOLUTION INTRAMUSCULAR; INTRAVENOUS at 17:58

## 2019-07-03 RX ADMIN — ROCURONIUM BROMIDE 40 MG: 10 INJECTION, SOLUTION INTRAVENOUS at 17:58

## 2019-07-03 RX ADMIN — SODIUM CHLORIDE: 9 INJECTION, SOLUTION INTRAVENOUS at 13:00

## 2019-07-03 RX ADMIN — PHENYLEPHRINE HYDROCHLORIDE 50 MCG: 10 INJECTION INTRAVENOUS at 18:22

## 2019-07-03 RX ADMIN — PHENYLEPHRINE HYDROCHLORIDE 100 MCG: 10 INJECTION INTRAVENOUS at 18:29

## 2019-07-03 RX ADMIN — MULTIPLE VITAMINS W/ MINERALS TAB 1 TABLET: TAB at 09:26

## 2019-07-03 RX ADMIN — PROPOFOL 150 MG: 10 INJECTION, EMULSION INTRAVENOUS at 17:58

## 2019-07-03 RX ADMIN — ENOXAPARIN SODIUM 40 MG: 40 INJECTION SUBCUTANEOUS at 09:25

## 2019-07-03 RX ADMIN — FENTANYL CITRATE 50 MCG: 50 INJECTION, SOLUTION INTRAMUSCULAR; INTRAVENOUS at 17:51

## 2019-07-03 RX ADMIN — OXYCODONE HYDROCHLORIDE 5 MG: 5 TABLET ORAL at 09:25

## 2019-07-03 RX ADMIN — Medication 0.6 MG: at 19:17

## 2019-07-03 ASSESSMENT — PULMONARY FUNCTION TESTS
PIF_VALUE: 24
PIF_VALUE: 31
PIF_VALUE: 23
PIF_VALUE: 25
PIF_VALUE: 32
PIF_VALUE: 24
PIF_VALUE: 36
PIF_VALUE: 1
PIF_VALUE: 1
PIF_VALUE: 33
PIF_VALUE: 29
PIF_VALUE: 37
PIF_VALUE: 33
PIF_VALUE: 1
PIF_VALUE: 24
PIF_VALUE: 24
PIF_VALUE: 2
PIF_VALUE: 30
PIF_VALUE: 35
PIF_VALUE: 24
PIF_VALUE: 32
PIF_VALUE: 30
PIF_VALUE: 33
PIF_VALUE: 37
PIF_VALUE: 3
PIF_VALUE: 24
PIF_VALUE: 32
PIF_VALUE: 35
PIF_VALUE: 2
PIF_VALUE: 32
PIF_VALUE: 30
PIF_VALUE: 19
PIF_VALUE: 29
PIF_VALUE: 33
PIF_VALUE: 3
PIF_VALUE: 35
PIF_VALUE: 32
PIF_VALUE: 2
PIF_VALUE: 34
PIF_VALUE: 31
PIF_VALUE: 1
PIF_VALUE: 3
PIF_VALUE: 33
PIF_VALUE: 24
PIF_VALUE: 34
PIF_VALUE: 33
PIF_VALUE: 37
PIF_VALUE: 11
PIF_VALUE: 10
PIF_VALUE: 33
PIF_VALUE: 32
PIF_VALUE: 1
PIF_VALUE: 32
PIF_VALUE: 1
PIF_VALUE: 24
PIF_VALUE: 2
PIF_VALUE: 33
PIF_VALUE: 22
PIF_VALUE: 32
PIF_VALUE: 32
PIF_VALUE: 35
PIF_VALUE: 31
PIF_VALUE: 32
PIF_VALUE: 24
PIF_VALUE: 32
PIF_VALUE: 30
PIF_VALUE: 20
PIF_VALUE: 30
PIF_VALUE: 25
PIF_VALUE: 32
PIF_VALUE: 31
PIF_VALUE: 6
PIF_VALUE: 24
PIF_VALUE: 25
PIF_VALUE: 3
PIF_VALUE: 2
PIF_VALUE: 32
PIF_VALUE: 2
PIF_VALUE: 33
PIF_VALUE: 1
PIF_VALUE: 25
PIF_VALUE: 31
PIF_VALUE: 24
PIF_VALUE: 24
PIF_VALUE: 33
PIF_VALUE: 2
PIF_VALUE: 33
PIF_VALUE: 38
PIF_VALUE: 24
PIF_VALUE: 34
PIF_VALUE: 35
PIF_VALUE: 33
PIF_VALUE: 25

## 2019-07-03 ASSESSMENT — PAIN DESCRIPTION - ONSET: ONSET: ON-GOING

## 2019-07-03 ASSESSMENT — PAIN DESCRIPTION - DESCRIPTORS: DESCRIPTORS: ACHING;DISCOMFORT;DULL

## 2019-07-03 ASSESSMENT — PAIN SCALES - GENERAL
PAINLEVEL_OUTOF10: 0
PAINLEVEL_OUTOF10: 8
PAINLEVEL_OUTOF10: 0

## 2019-07-03 ASSESSMENT — PAIN DESCRIPTION - PAIN TYPE: TYPE: ACUTE PAIN

## 2019-07-03 ASSESSMENT — PAIN DESCRIPTION - FREQUENCY: FREQUENCY: INTERMITTENT

## 2019-07-03 ASSESSMENT — PAIN DESCRIPTION - LOCATION: LOCATION: NECK

## 2019-07-03 NOTE — ANESTHESIA PRE PROCEDURE
(LEXAPRO) tablet 20 mg  20 mg Oral Daily [facility], DO   20 mg at 07/03/19 0926    fluticasone (FLONASE) 50 MCG/ACT nasal spray 1 spray  1 spray Nasal Daily [facility], DO   1 spray at 07/03/19 7505    therapeutic multivitamin-minerals 1 tablet  1 tablet Oral Daily [facility], DO   1 tablet at 07/03/19 8264    oxyCODONE (ROXICODONE) immediate release tablet 5 mg  5 mg Oral Q4H PRN [facility], DO   5 mg at 07/03/19 3189    vitamin B-12 (CYANOCOBALAMIN) tablet 50 mcg  50 mcg Oral Daily [facility], DO   50 mcg at 07/03/19 0351    sodium chloride flush 0.9 % injection 10 mL  10 mL Intravenous 2 times per day [facility], DO   10 mL at 07/03/19 1379    sodium chloride flush 0.9 % injection 10 mL  10 mL Intravenous PRN [facility], DO        magnesium hydroxide (MILK OF MAGNESIA) 400 MG/5ML suspension 30 mL  30 mL Oral Daily PRN [facility], DO        ondansetron [facility]) injection 4 mg  4 mg Intravenous Q6H PRN [facility], DO        morphine (PF) injection 2 mg  2 mg Intravenous Q4H PRN [facility], DO           Allergies: Allergies   Allergen Reactions    Sulfa Antibiotics      As a child       Problem List:    Patient Active Problem List   Diagnosis Code    Cervical stenosis of spinal canal M48.02    Essential hypertension I10       Past Medical History:        Diagnosis Date    Anxiety     Depression     Osteoarthritis        Past Surgical History:        Procedure Laterality Date    CERVICAL FUSION N/A 6/27/2019    POSTERIOR C3-C6 LAMINECTOMY AND FUSION -- NEEDS PLATES, SCREWS, CRISTÓBAL TABLE, CELL SAVER - GLOBUS performed by Lacie Guerrero MD at [address]      ov[place]    JOINT REPLACEMENT      right hip    TONSILLECTOMY AND ADENOIDECTOMY      TOTAL HIP ARTHROPLASTY Right 2015       Social History:    Social History     Tobacco Use    Smoking status: Never Smoker    Smokeless tobacco: Never Used   Substance Use Topics    Alcohol use:  No Counseling given: Not Answered      Vital Signs (Current): There were no vitals filed for this visit. BP Readings from Last 3 Encounters:   07/03/19 (!) 179/79   06/30/19 118/82   06/29/19 134/72       NPO Status:  pt instructed NPO after midnight 6/27/2019                                                                                BMI:   Wt Readings from Last 3 Encounters:   07/03/19 187 lb 6.3 oz (85 kg)   06/30/19 186 lb (84.4 kg)   06/27/19 186 lb (84.4 kg)     There is no height or weight on file to calculate BMI.    CBC:   Lab Results   Component Value Date    WBC 11.1 07/03/2019    RBC 3.84 07/03/2019    HGB 11.1 07/03/2019    HCT 35.2 07/03/2019    MCV 91.7 07/03/2019    RDW 14.1 07/03/2019     07/03/2019       CMP:   Lab Results   Component Value Date     07/03/2019    K 3.7 07/03/2019    CL 97 07/03/2019    CO2 27 07/03/2019    BUN 10 07/03/2019    CREATININE 0.6 07/03/2019    GFRAA >60 07/03/2019    LABGLOM >60 07/03/2019    GLUCOSE 137 07/03/2019    PROT 7.2 07/01/2019    CALCIUM 9.6 07/03/2019    BILITOT 0.9 07/01/2019    ALKPHOS 226 07/01/2019     07/01/2019     07/01/2019       POC Tests: No results for input(s): POCGLU, POCNA, POCK, POCCL, POCBUN, POCHEMO, POCHCT in the last 72 hours. Coags:   Lab Results   Component Value Date    PROTIME 11.2 06/21/2019    INR 1.0 06/21/2019       HCG (If Applicable): No results found for: PREGTESTUR, PREGSERUM, HCG, HCGQUANT     ABGs: No results found for: PHART, PO2ART, RNE2GHR, NLN6ZFE, BEART, H4BZGQFK     Type & Screen (If Applicable):  No results found for: Alma Das [address]     6/21/2019 EKG  EKG 12 lead   Study Date: 06/21/2019   Audra Adams 77 y.o.     Ordering Provider DRAKE Uriarte   Indications Cervical stenosis of spinal canal [L17.32 (ICD-10-CM)]   Result Information     Status: Preliminary result (6/21/2019 08:58) Provider Status: Ordered   Routing TARA Berumenon, DO   50 mcg at 07/03/19 0925    sodium chloride flush 0.9 % injection 10 mL  10 mL Intravenous 2 times per day Corinne Roxann, DO   10 mL at 07/03/19 5103    sodium chloride flush 0.9 % injection 10 mL  10 mL Intravenous PRN Corinne Roxann, DO        magnesium hydroxide (MILK OF MAGNESIA) 400 MG/5ML suspension 30 mL  30 mL Oral Daily PRN Corinne Roxann, DO        ondansetron [facility]) injection 4 mg  4 mg Intravenous Q6H PRN Corinne Roxann, DO        morphine (PF) injection 2 mg  2 mg Intravenous Q4H PRN Corinne Roxann, DO           Allergies: Allergies   Allergen Reactions    Sulfa Antibiotics      As a child       Problem List:    Patient Active Problem List   Diagnosis Code    Cervical stenosis of spinal canal M48.02    Essential hypertension I10       Past Medical History:        Diagnosis Date    Anxiety     Depression     Osteoarthritis        Past Surgical History:        Procedure Laterality Date    CERVICAL FUSION N/A 6/27/2019    POSTERIOR C3-C6 LAMINECTOMY AND FUSION -- NEEDS PLATES, SCREWS, CRISTÓBAL TABLE, CELL SAVER - GLOBUS performed by Jimbo Brooks MD at [address]      [place]    JOINT REPLACEMENT      right hip    TONSILLECTOMY AND ADENOIDECTOMY      TOTAL HIP ARTHROPLASTY Right 2015       Social History:    Social History     Tobacco Use    Smoking status: Never Smoker    Smokeless tobacco: Never Used   Substance Use Topics    Alcohol use: No                                Counseling given: Not Answered      Vital Signs (Current): There were no vitals filed for this visit.                                            BP Readings from Last 3 Encounters:   07/03/19 (!) 179/79   06/30/19 118/82   06/29/19 134/72       NPO Status:  pt instructed NPO after midnight 6/27/2019                                                                                BMI:   Wt Readings from Last 3 Encounters:   07/03/19 187 lb 6.3 oz (85 kg)   06/30/19 186 lb (84.4 kg) Preliminary 06/21/2019  8:53 AM [facility]   Atrial Rate 73  BPM Preliminary 06/21/2019  8:53 AM [facility]   P-R Interval 146  ms Preliminary 06/21/2019  8:53 AM [facility]   QRS Duration 86  ms Preliminary 06/21/2019  8:53 AM [facility]   Q-T Interval 386  ms Preliminary 06/21/2019  8:53 AM [facility]   QTc Calculation (Bazett) 425  ms Preliminary 06/21/2019  8:53 AM [facility]   P Axis -20  degrees Preliminary 06/21/2019  8:53 AM [facility]   R [place] 49  degrees Preliminary 06/21/2019  8:53 AM [facility]   T [place] 47  degrees Preliminary 06/21/2019  8:53 AM [facility]   Testing Performed By     Lab - Abbreviation Name Director Address Valid Date Range   360-[facility] MUSE Unknown Unknown 04/18/16 0721-Present   Narrative   Performed by: [facility]   Normal sinus rhythm  Normal ECG     3/1/2019 XR cervical spine     Mild grade 1 anterolisthesis C3 on C4. There is a well-corticated bony fragment overlying the inferior   endplate of C3 which may represent a remote avulsion fracture of the   osteophyte. Multiple osteophytes are seen along the lower cervical spine. No fracture or foreign body is identified. The disc spaces demonstrate moderate joint space loss primarily at   C1-C2 and C4-C5. Remainder of the cervical spine demonstrates mild   joint space loss    There is mild loss of the vertebral body height. There are areas of lucency noted through the bodies of C4-C5 and C6   which likely represent a lucency between contiguous proliferative   osteophytes at the uncinate process/vertebral articulation   The are severe degenerative changes involving the facets.           Impression   Findings consistent with moderate degenerative disc   disease and degenerative facets disease       The exam has been dictated and signed by Amanda Marion PA-C. Erik Deleon MD, Radiologist, have reviewed the images and   report and concur with these findings.      3/13/2019 MRI cervical spine     Findings: No evidence of fracture or

## 2019-07-03 NOTE — PROGRESS NOTES
Daily  sodium chloride flush 0.9 % injection 10 mL, 10 mL, Intravenous, 2 times per day  sodium chloride flush 0.9 % injection 10 mL, 10 mL, Intravenous, PRN  magnesium hydroxide (MILK OF MAGNESIA) 400 MG/5ML suspension 30 mL, 30 mL, Oral, Daily PRN  ondansetron (ZOFRAN) injection 4 mg, 4 mg, Intravenous, Q6H PRN  enoxaparin (LOVENOX) injection 40 mg, 40 mg, Subcutaneous, Daily  morphine (PF) injection 2 mg, 2 mg, Intravenous, Q4H PRN    ASSESSMENT:   · 77year old female s/p C3-6 laminectomy/fusion on 6/27 with myelopathy-  Cervical CT without hardware failure, good alignment, expected post op changes.   +op site discharge    PLAN:  · PT/OT  · WBAT with assistance  · Cervical collar  · Pain control  · Will plan on I&D of posterior cervical wound today  · NPO      Electronically signed by DRAKE Ramos on 7/3/2019 at 9:52 AM

## 2019-07-03 NOTE — PROGRESS NOTES
Minimal Assist    LB Dressing Dependent   Dependent  To don socks bed level. Moderate Assist    Bathing Maximal Assist  Per Eval  Will continue to assess. Minimal Assist    Toileting Dependent   Dependent  Erickson present. Moderate Assist    Bed Mobility  Supine to sit: n/t, pt educated on log rolling (pt up in chair)   Sit to supine: n/t, pt up in chair   Supine<>Sit: Mod A x2  Pt required increased time to complete d/t anxiety and pain. Pt educated on techniques to maintain precautions and for pain control. Pt reports increased tolerance to bed mobility utilizing spinal neutrality techniques. Supine to sit: Maximal Assist   Sit to supine: Maximal Assist    Functional Transfers Attempted, Dependent x 2 sit to stand from chair, unable to achieve stand    Sit<>Stand: Mod A   Completed x2 reps with min cues for hand placement/safety required. Pt requires B knees/feet blocked to complete. Maximal Assist x 2   Functional Mobility n/t   Mod A x2  Completed few side steps to [facility] with HHA x2. Min cues for sequencing/safety required. Pt demo'd posterior lean, unable to self correct. Balance Sitting:     Static: fair+    Dynamic: poor  Standing: n/t  Sitting:     Static: Min A to SBA    Dynamic:Min A  Standing: Mod A    Pt tolerated sitting at EOB x15 minutes with fair tolerance Occasional posterior lean noted requiring assist to recover. Standing completed with HHA x2 x2 reps with min cues for posture and body mechanics.      Activity Tolerance Poor, limited by pain Fair-   Pt requires increased time to complete activities and frequent rest breaks d/t fatigue.   fair   Visual/  Perceptual Glasses: yes                       Comments: Upon arrival pt in supine with HOB elevated. Pt educated on transfer/mobility safety, body mechanics, posture, log roll/spinal neutrality for bed mobility, safety, adaptive techniques for ADL tasks and benefits of increasing activity.  Pt verbalized/demonstrated fair understanding,

## 2019-07-04 PROBLEM — S06.4XAA EPIDURAL HEMATOMA: Status: ACTIVE | Noted: 2019-07-04

## 2019-07-04 LAB — URINE CULTURE, ROUTINE: NORMAL

## 2019-07-04 PROCEDURE — 6370000000 HC RX 637 (ALT 250 FOR IP): Performed by: PHYSICIAN ASSISTANT

## 2019-07-04 PROCEDURE — 6360000002 HC RX W HCPCS: Performed by: NEUROLOGICAL SURGERY

## 2019-07-04 PROCEDURE — 97530 THERAPEUTIC ACTIVITIES: CPT

## 2019-07-04 PROCEDURE — 2700000000 HC OXYGEN THERAPY PER DAY

## 2019-07-04 PROCEDURE — 97110 THERAPEUTIC EXERCISES: CPT

## 2019-07-04 PROCEDURE — 2580000003 HC RX 258: Performed by: NEUROLOGICAL SURGERY

## 2019-07-04 PROCEDURE — 1200000000 HC SEMI PRIVATE

## 2019-07-04 PROCEDURE — 2580000003 HC RX 258: Performed by: PHYSICIAN ASSISTANT

## 2019-07-04 RX ADMIN — SODIUM CHLORIDE: 9 INJECTION, SOLUTION INTRAVENOUS at 09:37

## 2019-07-04 RX ADMIN — FLUTICASONE PROPIONATE 1 SPRAY: 50 SPRAY, METERED NASAL at 09:37

## 2019-07-04 RX ADMIN — VITAM B12 50 MCG: 100 TAB at 09:38

## 2019-07-04 RX ADMIN — OXYCODONE HYDROCHLORIDE 10 MG: 10 TABLET ORAL at 09:42

## 2019-07-04 RX ADMIN — AMLODIPINE BESYLATE 5 MG: 5 TABLET ORAL at 09:37

## 2019-07-04 RX ADMIN — Medication 10 ML: at 22:27

## 2019-07-04 RX ADMIN — DOCUSATE SODIUM 100 MG: 100 CAPSULE, LIQUID FILLED ORAL at 09:37

## 2019-07-04 RX ADMIN — OXYCODONE HYDROCHLORIDE 10 MG: 10 TABLET ORAL at 13:55

## 2019-07-04 RX ADMIN — ESCITALOPRAM OXALATE 20 MG: 10 TABLET, FILM COATED ORAL at 09:37

## 2019-07-04 RX ADMIN — MULTIPLE VITAMINS W/ MINERALS TAB 1 TABLET: TAB at 09:37

## 2019-07-04 RX ADMIN — DOCUSATE SODIUM 100 MG: 100 CAPSULE, LIQUID FILLED ORAL at 22:27

## 2019-07-04 RX ADMIN — CEFAZOLIN SODIUM 2 G: 10 INJECTION, POWDER, FOR SOLUTION INTRAVENOUS at 13:55

## 2019-07-04 RX ADMIN — CEFAZOLIN SODIUM 2 G: 10 INJECTION, POWDER, FOR SOLUTION INTRAVENOUS at 22:27

## 2019-07-04 RX ADMIN — CEFAZOLIN SODIUM 2 G: 10 INJECTION, POWDER, FOR SOLUTION INTRAVENOUS at 06:07

## 2019-07-04 ASSESSMENT — PAIN SCALES - GENERAL
PAINLEVEL_OUTOF10: 5
PAINLEVEL_OUTOF10: 7
PAINLEVEL_OUTOF10: 5
PAINLEVEL_OUTOF10: 0
PAINLEVEL_OUTOF10: 7

## 2019-07-04 NOTE — PLAN OF CARE
Problem: Falls - Risk of:  Goal: Will remain free from falls  Description  Will remain free from falls  7/4/2019 1334 by Pavan Morrow RN  Outcome: Met This Shift     Problem: Pain:  Goal: Pain level will decrease  Description  Pain level will decrease  Outcome: Met This Shift     Problem: Risk for Impaired Skin Integrity  Goal: Tissue integrity - skin and mucous membranes  Description  Structural intactness and normal physiological function of skin and  mucous membranes.   Outcome: Met This Shift

## 2019-07-04 NOTE — PROGRESS NOTES
Department of Neurosurgery  Attending Progress Note    CHIEF COMPLAINT:    SUBJECTIVE:  Feels better today    ROS:    OBJECTIVE  Physical  VITALS:  /68   Pulse 77   Temp 97.7 °F (36.5 °C) (Temporal)   Resp 16   Ht 5' 4\" (1.626 m)   Wt 187 lb 8 oz (85 kg)   SpO2 94%   BMI 32.18 kg/m²   NEUROLOGIC:  Mental Status Exam:  Level of Alertness:   awake  Orientation:   person, place, time  Motor Exam:  4/5 in bilateral UE and LE  Sensory:  Sensory intact    Data  CBC:   Lab Results   Component Value Date    WBC 11.1 07/03/2019    RBC 3.84 07/03/2019    HGB 11.1 07/03/2019    HCT 35.2 07/03/2019    MCV 91.7 07/03/2019    MCH 28.9 07/03/2019    MCHC 31.5 07/03/2019    RDW 14.1 07/03/2019     07/03/2019    MPV 10.0 07/03/2019     BMP:    Lab Results   Component Value Date     07/03/2019    K 3.7 07/03/2019    CL 97 07/03/2019    CO2 27 07/03/2019    BUN 10 07/03/2019    LABALBU 3.6 07/01/2019    CREATININE 0.6 07/03/2019    CALCIUM 9.6 07/03/2019    GFRAA >60 07/03/2019    LABGLOM >60 07/03/2019    GLUCOSE 137 07/03/2019     Current Inpatient Medications  Current Facility-Administered Medications: 0.9 % sodium chloride infusion, , Intravenous, Continuous  sodium chloride flush 0.9 % injection 10 mL, 10 mL, Intravenous, 2 times per day  sodium chloride flush 0.9 % injection 10 mL, 10 mL, Intravenous, PRN  docusate sodium (COLACE) capsule 100 mg, 100 mg, Oral, BID  ondansetron (ZOFRAN) injection 4 mg, 4 mg, Intravenous, Q6H PRN  ceFAZolin (ANCEF) 2 g in dextrose 5 % 50 mL IVPB, 2 g, Intravenous, Q8H  oxyCODONE (ROXICODONE) immediate release tablet 5 mg, 5 mg, Oral, Q4H PRN **OR** oxyCODONE HCl (OXY-IR) immediate release tablet 10 mg, 10 mg, Oral, Q4H PRN  tiZANidine (ZANAFLEX) tablet 4 mg, 4 mg, Oral, Q6H PRN  ceFAZolin (ANCEF) 2 g in dextrose 5 % 50 mL IVPB, 2 g, Intravenous, Once  amLODIPine (NORVASC) tablet 5 mg, 5 mg, Oral, Daily  cyclobenzaprine (FLEXERIL) tablet 10 mg, 10 mg, Oral, TID

## 2019-07-05 LAB
ANION GAP SERPL CALCULATED.3IONS-SCNC: 12 MMOL/L (ref 7–16)
BUN BLDV-MCNC: 16 MG/DL (ref 8–23)
CALCIUM SERPL-MCNC: 9.1 MG/DL (ref 8.6–10.2)
CHLORIDE BLD-SCNC: 101 MMOL/L (ref 98–107)
CO2: 28 MMOL/L (ref 22–29)
CREAT SERPL-MCNC: 0.6 MG/DL (ref 0.5–1)
GFR AFRICAN AMERICAN: >60
GFR NON-AFRICAN AMERICAN: >60 ML/MIN/1.73
GLUCOSE BLD-MCNC: 123 MG/DL (ref 74–99)
GRAM STAIN ORDERABLE: NORMAL
HCT VFR BLD CALC: 33.3 % (ref 34–48)
HEMOGLOBIN: 10.4 G/DL (ref 11.5–15.5)
MCH RBC QN AUTO: 28.7 PG (ref 26–35)
MCHC RBC AUTO-ENTMCNC: 31.2 % (ref 32–34.5)
MCV RBC AUTO: 92 FL (ref 80–99.9)
PDW BLD-RTO: 13.9 FL (ref 11.5–15)
PLATELET # BLD: 295 E9/L (ref 130–450)
PMV BLD AUTO: 9.9 FL (ref 7–12)
POTASSIUM SERPL-SCNC: 3.4 MMOL/L (ref 3.5–5)
RBC # BLD: 3.62 E12/L (ref 3.5–5.5)
SODIUM BLD-SCNC: 141 MMOL/L (ref 132–146)
WBC # BLD: 11.1 E9/L (ref 4.5–11.5)

## 2019-07-05 PROCEDURE — 36415 COLL VENOUS BLD VENIPUNCTURE: CPT

## 2019-07-05 PROCEDURE — 6370000000 HC RX 637 (ALT 250 FOR IP): Performed by: PHYSICIAN ASSISTANT

## 2019-07-05 PROCEDURE — 97535 SELF CARE MNGMENT TRAINING: CPT

## 2019-07-05 PROCEDURE — 97110 THERAPEUTIC EXERCISES: CPT

## 2019-07-05 PROCEDURE — 2700000000 HC OXYGEN THERAPY PER DAY

## 2019-07-05 PROCEDURE — 80048 BASIC METABOLIC PNL TOTAL CA: CPT

## 2019-07-05 PROCEDURE — 6370000000 HC RX 637 (ALT 250 FOR IP): Performed by: INTERNAL MEDICINE

## 2019-07-05 PROCEDURE — 85027 COMPLETE CBC AUTOMATED: CPT

## 2019-07-05 PROCEDURE — 2580000003 HC RX 258: Performed by: NEUROLOGICAL SURGERY

## 2019-07-05 PROCEDURE — 97110 THERAPEUTIC EXERCISES: CPT | Performed by: PHYSICAL THERAPIST

## 2019-07-05 PROCEDURE — 2580000003 HC RX 258: Performed by: PHYSICIAN ASSISTANT

## 2019-07-05 PROCEDURE — 1200000000 HC SEMI PRIVATE

## 2019-07-05 PROCEDURE — 6360000002 HC RX W HCPCS: Performed by: NEUROLOGICAL SURGERY

## 2019-07-05 PROCEDURE — 97530 THERAPEUTIC ACTIVITIES: CPT

## 2019-07-05 RX ORDER — POTASSIUM CHLORIDE 20 MEQ/1
40 TABLET, EXTENDED RELEASE ORAL ONCE
Status: COMPLETED | OUTPATIENT
Start: 2019-07-05 | End: 2019-07-05

## 2019-07-05 RX ADMIN — VITAM B12 50 MCG: 100 TAB at 10:24

## 2019-07-05 RX ADMIN — OXYCODONE HYDROCHLORIDE 10 MG: 10 TABLET ORAL at 15:29

## 2019-07-05 RX ADMIN — CEFAZOLIN SODIUM 2 G: 10 INJECTION, POWDER, FOR SOLUTION INTRAVENOUS at 05:37

## 2019-07-05 RX ADMIN — DOCUSATE SODIUM 100 MG: 100 CAPSULE, LIQUID FILLED ORAL at 20:23

## 2019-07-05 RX ADMIN — ESCITALOPRAM OXALATE 20 MG: 10 TABLET, FILM COATED ORAL at 10:24

## 2019-07-05 RX ADMIN — OXYCODONE HYDROCHLORIDE 10 MG: 10 TABLET ORAL at 01:56

## 2019-07-05 RX ADMIN — POTASSIUM CHLORIDE 40 MEQ: 20 TABLET, EXTENDED RELEASE ORAL at 10:24

## 2019-07-05 RX ADMIN — OXYCODONE HYDROCHLORIDE 10 MG: 10 TABLET ORAL at 10:23

## 2019-07-05 RX ADMIN — CEFAZOLIN SODIUM 2 G: 10 INJECTION, POWDER, FOR SOLUTION INTRAVENOUS at 13:31

## 2019-07-05 RX ADMIN — DOCUSATE SODIUM 100 MG: 100 CAPSULE, LIQUID FILLED ORAL at 10:24

## 2019-07-05 RX ADMIN — AMLODIPINE BESYLATE 5 MG: 5 TABLET ORAL at 10:24

## 2019-07-05 RX ADMIN — SODIUM CHLORIDE: 9 INJECTION, SOLUTION INTRAVENOUS at 05:36

## 2019-07-05 RX ADMIN — MULTIPLE VITAMINS W/ MINERALS TAB 1 TABLET: TAB at 10:24

## 2019-07-05 RX ADMIN — FLUTICASONE PROPIONATE 1 SPRAY: 50 SPRAY, METERED NASAL at 10:23

## 2019-07-05 ASSESSMENT — PAIN DESCRIPTION - LOCATION: LOCATION: HEAD;NECK

## 2019-07-05 ASSESSMENT — PAIN DESCRIPTION - ONSET: ONSET: ON-GOING

## 2019-07-05 ASSESSMENT — PAIN SCALES - GENERAL
PAINLEVEL_OUTOF10: 7
PAINLEVEL_OUTOF10: 7
PAINLEVEL_OUTOF10: 6
PAINLEVEL_OUTOF10: 0

## 2019-07-05 ASSESSMENT — PAIN DESCRIPTION - PAIN TYPE: TYPE: SURGICAL PAIN

## 2019-07-05 ASSESSMENT — PAIN DESCRIPTION - ORIENTATION: ORIENTATION: RIGHT;LEFT

## 2019-07-05 ASSESSMENT — PAIN DESCRIPTION - FREQUENCY: FREQUENCY: CONTINUOUS

## 2019-07-05 ASSESSMENT — PAIN - FUNCTIONAL ASSESSMENT: PAIN_FUNCTIONAL_ASSESSMENT: ACTIVITIES ARE NOT PREVENTED

## 2019-07-05 ASSESSMENT — PAIN DESCRIPTION - DESCRIPTORS: DESCRIPTORS: ACHING;CONSTANT;DISCOMFORT;SHARP

## 2019-07-05 ASSESSMENT — PAIN DESCRIPTION - PROGRESSION: CLINICAL_PROGRESSION: GRADUALLY WORSENING

## 2019-07-05 NOTE — PROGRESS NOTES
Physical Therapy  Facility/Department: [facility] MED SURG ONC  Daily Treatment Note  NAME: Sahra Richter  : 1950  MRN: [id number]    Date of Service: 2019    Evaluating Therapist: Mohit Good PT, DPT         Equipment Recommendations: to be determined.      Room #: 7870/4182-N  DIAGNOSIS: paresthesia s/p C3-6 PCF/laminectomy on    OPERATIVE PROCEDURES: 7/3   1. Exploration of posterior cervical wound. 2.  Evacuation of posterior cervical epidural hematoma. PRECAUTIONS: fall risk, C collar, cervical precautions, drain      Social:  Pt lives alone in a 2 floor plan 4 steps and 0 rails to enter. Prior to admission pt walked with no device.  Pt admit from [facility]. Pt reports requires 2 person assist.                       Initial Evaluation  Date:  Treatment      Short Term/ Long Term   Goals   Was pt agreeable to Eval/treatment? yes  yes     Does pt have pain? No c/o pain   c/o neck pain 7/10      Bed Mobility  Rolling: NT  Supine to sit: NT  Sit to supine: NT  Scooting: NT  Rolling: NT  Supine to sit: NT  Sit to supine: NT    Max A    Transfers Sit to stand: dep x 2 - unable to stand   Stand to sit: dep x 2  Stand pivot: NT  Sit to stand: Mod A x 2  Stand to sit: Mod A  Stand pivot: NT Mod a x 2   Ambulation    NT  NT NT   Stair negotiation: ascended and descended  NT  NT NT   AM-PAC Raw Score             Balance: static standing mod A     Pt performed therapeutic exercise of the following: ankle pumps, seated hip flexion, LAQ's     Patient education  Pt was educated on mobility    Patient response to education:   Pt verbalized understanding Pt demonstrated skill Pt requires further education in this area   x  x     Additional Comments: pt found sitting in bedside chair agreeable to treatment. Pt demonstrates decreased balance standing but able to stand 1.5 minutes with mod A with no device.      Pt was left sitting in bedside chair with call light left by patient and RN present

## 2019-07-05 NOTE — PROGRESS NOTES
Acute Rehab Pre-Admission Screen      Referral date: 7/2/19  Onset/Hospital Admit Date: 7/1/2019 10:02 PM  Current Location: [facility]/8416-  Admitting Diagnosis: Cervical Stenosis   Surgery /  Date:  6/27/19 POSTERIOR C3-C6 LAMINECTOMY AND FUSION  7/3/19 Exploration of posterior cervical wound, Evacuation of posterior cervical epidural hematoma  Name: Raisa Plunkett  YOB: 1950  Age: 77 y.o.   Address: [address] [place] [address]  Home Phone: [phone] (home)  Social Security #:     Sex: female  Race:   Marital Status:    Ethnic/Cultural/Confucianist Considerations: none    Advanced Directives: [x] Full Code  [] [address] [] Medications only       [] Living Will  [x] DPOA      []Organ donor      [] No mechanical breathing or ventilation     [] no tube feeding, nutrition or hydration      [] Patient does not have advanced directives or living will     Copies in Chart: no    COVERAGE INFORMATION   Primary Insurer: Medicare  Secondary Insurer: [employer] Medicare #: [id number]  Verified coverage: [] Patient  [] Family/caregiver    [x] financial department [] insurance carrier    PHYSICIAN / Eddie Viramontes INFORMATION    Referring Physician: Jose Bautista  Attending Physician: Pretty Barry DO  Primary Care Physician: Ivis Varela MD  Consultants/Opinions (see full consult notes on chart): Joselyn (neurosurgery) rec: I&D of posterior cervical wound    SOCIAL INFORMATION    Primary  Contact: Mauro Whitlock  Relationship: son  Primary Phone: [phone]    Secondary  Contact: Bruna Taylor  Relationship: child  Primary Phone: [phone]     Previous Community Services: was at [employer] after [employer], prior home  Caregiver available: [x] Yes [] No Hours per day available: to be determined  Patient previously employed:  [] Yes [] Part Time [] Full Time [x] No [x] Retired  Occupation/Profession: Retired  Prior living arrangements: [x] Home  [] Assisted living  [x] SNF [] Other  Lived with: [x] Alone  [] Spouse  [] Family  [] Other  Home:  2 story home  4 entry steps  Rails: 0  Steps:  ? to 2nd floor  Rails:  ?   Bedroom: [] 1st floor  [] 2nd floor    Bathroom:  [] 1st floor  [] 2nd floor    Prior Functional Level: Independent for: ADLs, IADLs, ambulation, drove  Dominant hand: [x] Right  [] Left    Previous Home Equipment:  [] Cane [] Grab bars [] Orthotic / prosthetic   [] Shower chair [] Tub bench  [] 3-in-1 Commode [] Long handle sponge   [] Oxygen [] Sock aide  [] Wheelchair  [] motorized wc/scooter  [] Wheelchair cushion   [] Crutches [] Long handle shoehorn  [] Reachers [] Toilet seat elevator  [x] Walker(wheeled)   [] Walker(standard) [] Mechanical lift    [] None of the above    MEDICAL UPDATE:  History of present admission:The patient is a 77 y.o. female patient of dr Manda grimes who presents with complains of neck pain and extremity numbness. Patient was discharged from the hospital on 6/29/2019. She underwent cervical laminectomy on 6/27/19. She tolerated this well and was discharged to a rehab facility. She has had continued neck pain and numbness in her arms. She was sent to the ED for further re-evaluation. Pt found to have fluid collection in mid cervical spine and went to OR on 7/3/19 with Dr. Paige Malin for exploration of posterior cervical wound and evacuation of posterior cervical epidural hematoma. Past Medical:  Past Medical History:   Diagnosis Date    Anxiety     Depression     Osteoarthritis         Influenza vaccine given:  [] yes   [] no  [x] n/a (out of season)    Has patient had 2 or more falls in the past year? [] yes   [x] no   Has patient had any fall with injury in the past year? [] yes   [x] no  [] unknown  Has patient had major surgery during past 100 days prior to admission?    [x] yes   [] no Type/ Date:see above      Surgical History:  Past Surgical History:   Procedure Laterality Date    CERVICAL FUSION N/A 6/27/2019    POSTERIOR C3-C6 LAMINECTOMY AND

## 2019-07-05 NOTE — PLAN OF CARE
Problem: Falls - Risk of:  Goal: Will remain free from falls  Description  Will remain free from falls  7/4/2019 2357 by Niraj Saha RN  Outcome: Met This Shift  7/4/2019 1334 by Gricelda Fontanez RN  Outcome: Met This Shift

## 2019-07-05 NOTE — CARE COORDINATION
Per Deon Mazariegos from [facility]. 57 acute rehab, she spoke with Chicho Adame and patient will be ready for ARU tomorrow 7/6/19. Charge nurse, patient and son all notified.   Thu Baez RN CM

## 2019-07-06 ENCOUNTER — HOSPITAL ENCOUNTER (INPATIENT)
Age: 69
LOS: 34 days | Discharge: NURSING, REHAB OR LONG-TERM CARE | DRG: 949 | End: 2019-08-09
Attending: PHYSICAL MEDICINE & REHABILITATION | Admitting: PHYSICAL MEDICINE & REHABILITATION
Payer: MEDICARE

## 2019-07-06 VITALS
OXYGEN SATURATION: 94 % | DIASTOLIC BLOOD PRESSURE: 72 MMHG | HEART RATE: 95 BPM | HEIGHT: 64 IN | TEMPERATURE: 98.4 F | SYSTOLIC BLOOD PRESSURE: 148 MMHG | BODY MASS INDEX: 32.01 KG/M2 | WEIGHT: 187.5 LBS | RESPIRATION RATE: 17 BRPM

## 2019-07-06 PROBLEM — S14.151A INCOMPLETE SPINAL CORD INJURY AT C1-C4 LEVEL WITHOUT BONE INJURY (HCC): Status: ACTIVE | Noted: 2019-07-06

## 2019-07-06 LAB
BODY FLUID CULTURE, STERILE: NORMAL
GRAM STAIN RESULT: NORMAL

## 2019-07-06 PROCEDURE — 1280000000 HC REHAB R&B

## 2019-07-06 PROCEDURE — 6370000000 HC RX 637 (ALT 250 FOR IP): Performed by: PHYSICIAN ASSISTANT

## 2019-07-06 PROCEDURE — 6370000000 HC RX 637 (ALT 250 FOR IP): Performed by: PHYSICAL MEDICINE & REHABILITATION

## 2019-07-06 PROCEDURE — 97530 THERAPEUTIC ACTIVITIES: CPT

## 2019-07-06 PROCEDURE — 2700000000 HC OXYGEN THERAPY PER DAY

## 2019-07-06 PROCEDURE — 6370000000 HC RX 637 (ALT 250 FOR IP): Performed by: INTERNAL MEDICINE

## 2019-07-06 PROCEDURE — 2580000003 HC RX 258: Performed by: PHYSICIAN ASSISTANT

## 2019-07-06 RX ORDER — M-VIT,TX,IRON,MINS/CALC/FOLIC 27MG-0.4MG
1 TABLET ORAL DAILY
Status: CANCELLED | OUTPATIENT
Start: 2019-07-07

## 2019-07-06 RX ORDER — UBIDECARENONE 75 MG
50 CAPSULE ORAL DAILY
Status: DISCONTINUED | OUTPATIENT
Start: 2019-07-07 | End: 2019-08-09 | Stop reason: HOSPADM

## 2019-07-06 RX ORDER — TIZANIDINE 4 MG/1
4 TABLET ORAL EVERY 6 HOURS PRN
Status: DISCONTINUED | OUTPATIENT
Start: 2019-07-06 | End: 2019-08-03

## 2019-07-06 RX ORDER — DOCUSATE SODIUM 100 MG/1
100 CAPSULE, LIQUID FILLED ORAL 2 TIMES DAILY
Status: DISCONTINUED | OUTPATIENT
Start: 2019-07-06 | End: 2019-08-09 | Stop reason: HOSPADM

## 2019-07-06 RX ORDER — OXYCODONE HYDROCHLORIDE 10 MG/1
10 TABLET ORAL EVERY 4 HOURS PRN
Status: DISCONTINUED | OUTPATIENT
Start: 2019-07-06 | End: 2019-08-03

## 2019-07-06 RX ORDER — DOCUSATE SODIUM 100 MG/1
100 CAPSULE, LIQUID FILLED ORAL 2 TIMES DAILY
Status: CANCELLED | OUTPATIENT
Start: 2019-07-06

## 2019-07-06 RX ORDER — FLUTICASONE PROPIONATE 50 MCG
1 SPRAY, SUSPENSION (ML) NASAL DAILY
Status: CANCELLED | OUTPATIENT
Start: 2019-07-07

## 2019-07-06 RX ORDER — CYCLOBENZAPRINE HCL 10 MG
10 TABLET ORAL 3 TIMES DAILY PRN
Status: DISCONTINUED | OUTPATIENT
Start: 2019-07-06 | End: 2019-08-03

## 2019-07-06 RX ORDER — M-VIT,TX,IRON,MINS/CALC/FOLIC 27MG-0.4MG
1 TABLET ORAL DAILY
Status: DISCONTINUED | OUTPATIENT
Start: 2019-07-07 | End: 2019-08-09 | Stop reason: HOSPADM

## 2019-07-06 RX ORDER — CYCLOBENZAPRINE HCL 10 MG
10 TABLET ORAL 3 TIMES DAILY PRN
Status: CANCELLED | OUTPATIENT
Start: 2019-07-06

## 2019-07-06 RX ORDER — ACETAMINOPHEN 325 MG/1
650 TABLET ORAL EVERY 4 HOURS PRN
Status: DISCONTINUED | OUTPATIENT
Start: 2019-07-06 | End: 2019-08-03

## 2019-07-06 RX ORDER — ESCITALOPRAM OXALATE 10 MG/1
20 TABLET ORAL DAILY
Status: DISCONTINUED | OUTPATIENT
Start: 2019-07-07 | End: 2019-08-09 | Stop reason: HOSPADM

## 2019-07-06 RX ORDER — AMLODIPINE BESYLATE 5 MG/1
5 TABLET ORAL DAILY
Status: DISCONTINUED | OUTPATIENT
Start: 2019-07-07 | End: 2019-08-09 | Stop reason: HOSPADM

## 2019-07-06 RX ORDER — OXYCODONE HYDROCHLORIDE 5 MG/1
5 TABLET ORAL EVERY 4 HOURS PRN
Status: CANCELLED | OUTPATIENT
Start: 2019-07-06

## 2019-07-06 RX ORDER — AMLODIPINE BESYLATE 5 MG/1
5 TABLET ORAL DAILY
Status: CANCELLED | OUTPATIENT
Start: 2019-07-07

## 2019-07-06 RX ORDER — ESCITALOPRAM OXALATE 10 MG/1
20 TABLET ORAL DAILY
Status: CANCELLED | OUTPATIENT
Start: 2019-07-07

## 2019-07-06 RX ORDER — OXYCODONE HYDROCHLORIDE 5 MG/1
5 TABLET ORAL EVERY 4 HOURS PRN
Status: DISCONTINUED | OUTPATIENT
Start: 2019-07-06 | End: 2019-08-03

## 2019-07-06 RX ORDER — OXYCODONE HYDROCHLORIDE 10 MG/1
10 TABLET ORAL EVERY 4 HOURS PRN
Status: CANCELLED | OUTPATIENT
Start: 2019-07-06

## 2019-07-06 RX ORDER — UBIDECARENONE 75 MG
50 CAPSULE ORAL DAILY
Status: CANCELLED | OUTPATIENT
Start: 2019-07-07

## 2019-07-06 RX ORDER — TIZANIDINE 4 MG/1
4 TABLET ORAL EVERY 6 HOURS PRN
Status: CANCELLED | OUTPATIENT
Start: 2019-07-06

## 2019-07-06 RX ORDER — FLUTICASONE PROPIONATE 50 MCG
1 SPRAY, SUSPENSION (ML) NASAL DAILY
Status: DISCONTINUED | OUTPATIENT
Start: 2019-07-07 | End: 2019-08-09 | Stop reason: HOSPADM

## 2019-07-06 RX ADMIN — DOCUSATE SODIUM 100 MG: 100 CAPSULE, LIQUID FILLED ORAL at 20:56

## 2019-07-06 RX ADMIN — FLUTICASONE PROPIONATE 1 SPRAY: 50 SPRAY, METERED NASAL at 10:42

## 2019-07-06 RX ADMIN — OXYCODONE HYDROCHLORIDE 10 MG: 10 TABLET ORAL at 22:36

## 2019-07-06 RX ADMIN — SODIUM CHLORIDE: 9 INJECTION, SOLUTION INTRAVENOUS at 01:05

## 2019-07-06 RX ADMIN — ESCITALOPRAM OXALATE 20 MG: 10 TABLET, FILM COATED ORAL at 10:43

## 2019-07-06 RX ADMIN — MAGNESIUM HYDROXIDE 30 ML: 400 SUSPENSION ORAL at 20:56

## 2019-07-06 RX ADMIN — DOCUSATE SODIUM 100 MG: 100 CAPSULE, LIQUID FILLED ORAL at 10:42

## 2019-07-06 RX ADMIN — VITAM B12 50 MCG: 100 TAB at 10:43

## 2019-07-06 RX ADMIN — MULTIPLE VITAMINS W/ MINERALS TAB 1 TABLET: TAB at 10:43

## 2019-07-06 RX ADMIN — AMLODIPINE BESYLATE 5 MG: 5 TABLET ORAL at 10:42

## 2019-07-06 ASSESSMENT — PAIN SCALES - GENERAL
PAINLEVEL_OUTOF10: 4
PAINLEVEL_OUTOF10: 7
PAINLEVEL_OUTOF10: 0

## 2019-07-06 ASSESSMENT — PAIN DESCRIPTION - FREQUENCY: FREQUENCY: CONTINUOUS

## 2019-07-06 ASSESSMENT — PAIN DESCRIPTION - PAIN TYPE: TYPE: SURGICAL PAIN

## 2019-07-06 ASSESSMENT — PAIN DESCRIPTION - ORIENTATION: ORIENTATION: MID;LEFT;RIGHT

## 2019-07-06 ASSESSMENT — PAIN DESCRIPTION - ONSET: ONSET: AWAKENED FROM SLEEP

## 2019-07-06 ASSESSMENT — PAIN DESCRIPTION - LOCATION: LOCATION: NECK

## 2019-07-06 ASSESSMENT — PAIN DESCRIPTION - DESCRIPTORS: DESCRIPTORS: ACHING;CONSTANT;DULL

## 2019-07-06 NOTE — PROGRESS NOTES
Patient oriented to room and new admission folder given. Patient Guide reviewed and explanation of Patient Rights and Responsibilities completed. I gave a signed copy of The Important Message From Medicare to patient.  Tom Pereira  7/6/2019  5:14 PM

## 2019-07-06 NOTE — LETTER
[facility]  By Wheelchair Shelby Espinoza    Individual Information:  1. Name (Enter the full name of the individual transported) Nathalia Morris 2. [place] Medicaid Billing Number-12digits      3. Address (Individual's home address) [address], [place], [address] [facility]         Transportation Provider Information  4. Provider Name (Enter the business name of the transportation provider) [employer]     5. [place] Medicaid Provider Number- 7 digits 6. National Provider Identifier (NPI)- 10 digits            Certification:  7. Criteria:    By signing this document, the practitioner certifies that two statements are true:    A. This individual must be accompanied by a mobility-related assistive device from the point of  to the point of drop off. B.  Transport of this individual by standard passenger vehicle or common carrier is precluded or contraindicated. 8. Period Beginning Date (Enter the first date of the certification period) 8/9/19  __________________________________  9. Length Joseph Quails one box to indicate the length of time for which the individual is certified for transport. For certification on a temporary basis, specify the number of calendar days, up to 80. If no time period is indicated, then the certification is valid for the period beginning date only). [x]  Not more than 1 days         []  One year            Additional Information Relevant to Certification  10. Comments or Explanations, If necessary or appropriate:         Certifying Practitioner Information  11. Name of Practitioner (Enter the full name of certifying practitioner)DR. Ori Barragan MD     12. [place] Medicaid Provider Number, if applicable- 7 digits [id number] 74. National Provider Identifier (NPI)- 10 digits [id number]     Signature Information  14. Date of Signature 8/5/19 15.  Name of person signing Heath Hansen

## 2019-07-06 NOTE — PROGRESS NOTES
with small shuffled steps limited by fatigue. Pt left seated in bedside chair with all needs met and call light within reach.           Time in: 3641  Time out: Giselle 7 PTA [id number]

## 2019-07-06 NOTE — CONSULTS
for all other  pertinent positive review of systems. PHYSICAL EXAMINATION:  GENERAL:  On exam, a well-developed, well-nourished female appearing her  stated age in no acute distress. She is alert and oriented x3. SKIN:  Warm and dry. No respiratory distress. No significant abdominal  distention. She had 4- out of 5 strength in her lower extremities and  her right upper extremities, 2 to 3 out of 5 strength in her left upper  extremity. She has some decreased sensation to light touch in the upper  extremities as well. Reflexes are 3+ in the upper and lower  extremities. Sustaining clonus. Toes are upgoing. IMPRESSION:  A 33-year-old female with progressive weakness, numbness,  and gait dysfunction status post C3-C6 cervical fusion on 06/27/2019. Cervical CT shows good alignment of the vertebral bodies. No hardware  failure. PLAN:  Continue cervical collar. Physical Therapy and Occupational  Therapy may be consulted, and she may be weightbearing as tolerated with  assistance. Dr. Rocco Linares will be informed of the patient in detail. Further plan of action pending his interpretation of the studies and  evaluation of the patient. DRAKE JOHNSON    I have interviewed and examined the patient and agree with above. She has myelopathy. She requires rehab placement. Her CT has been reviewed and fluid collection is consistent with post-operative changes.   No intervention    Bakari Aiken      D: 07/02/2019 11:13:43       T: 07/02/2019 11:21:13     [email or web address]  Job#: [id number]     Doc#: [id number]    CC:
mid cervical spine bilaterally. This is secondary to bony hypertrophy. OPERATIVE REPORT     PATIENT NAME: Santiago Champagne                       :        1950  MED REC NO:   [id number]                            ROOM:       8416  ACCOUNT NO:   [de-identified]                           ADMIT DATE: 2019  PROVIDER:     Jeremy Manjarrez MD     DATE OF PROCEDURE:  2019     PREOPERATIVE DIAGNOSIS:  Postoperative cervical fluid collection.     POSTOPERATIVE DIAGNOSIS:  Postoperative cervical epidural hematoma.     OPERATIVE PROCEDURES:  1. Exploration of posterior cervical wound. 2.  Evacuation of posterior cervical epidural hematoma.     ANESTHESIA:  Generalized endotracheal anesthesia.     SURGEON:  Jeremy Manjarrez MD     ASSISTANT:  Omero Palm DO.     COMPLICATIONS:  None.     ESTIMATED BLOOD LOSS:  100 mL.     SPECIMEN:  Epidural hematoma.     OPERATIVE INDICATIONS:  The patient is a 64-year-old lady who underwent  posterior cervical decompression and fusion last week. She initially  did well postoperatively; however, she noted that she had worsening  myelopathy with worsening numbness and gait instability. She had a CT  scan that showed some postoperative fluid consistent with postoperative  change; however, in light of the fact that she had progressive worsening  of her arm and leg strength and function, it was determined that she  would be taken to the operating room for the above-listed procedure.     DESCRIPTION FOR THE PROCEDURE:  The patient was brought into the  operating room. A timeout was performed where she was identified by her  name, medical record number, and the operative procedure which she was  about to undergo. Next, induction of generalized endotracheal  anesthesia was then commenced. Upon completion of induction of  generalized endotracheal anesthesia, she received preoperative  antibiotics.   She was then flipped into prone position on a

## 2019-07-06 NOTE — DISCHARGE SUMMARY
Physician Discharge Summary     Patient ID:  Maci Callejas  [id number]  77 y.o.  1950    Admit date: 7/1/2019    Discharge date and time: 7/6/2019     Admission Diagnoses: Paresthesia and pain of both upper extremities [R20.2, M79.601, M79.602]    Discharge Diagnoses: Active Hospital Problems    Diagnosis    Epidural hematoma ([address] [S06.4X9A]    Paresthesia [R20.2]    Essential hypertension [I10]    Cervical stenosis of spinal canal [M48.02]       Consults: Neurosurgery([facility])    Procedures:  none    Hospital Course:  Admitted after presenting with weakness and numbness following recent C3-C6 posterior cervical laminectomy and fusion. The patient underwent evacuation of a postoperative hematoma without complications. The patient  discharged today to acute rehab in improved and stable condition.         CBC with Differential:    Lab Results   Component Value Date    WBC 11.1 07/05/2019    RBC 3.62 07/05/2019    HGB 10.4 07/05/2019    HCT 33.3 07/05/2019     07/05/2019    MCV 92.0 07/05/2019    MCH 28.7 07/05/2019    MCHC 31.2 07/05/2019    RDW 13.9 07/05/2019    LYMPHOPCT 24.7 07/03/2019    MONOPCT 11.2 07/03/2019    BASOPCT 0.5 07/03/2019    MONOSABS 1.24 07/03/2019    LYMPHSABS 2.74 07/03/2019    EOSABS 0.20 07/03/2019    BASOSABS 0.06 07/03/2019     CMP:    Lab Results   Component Value Date     07/05/2019    K 3.4 07/05/2019    K 3.7 07/03/2019     07/05/2019    CO2 28 07/05/2019    BUN 16 07/05/2019    CREATININE 0.6 07/05/2019    GFRAA >60 07/05/2019    LABGLOM >60 07/05/2019    GLUCOSE 123 07/05/2019    PROT 7.2 07/01/2019    LABALBU 3.6 07/01/2019    CALCIUM 9.1 07/05/2019    BILITOT 0.9 07/01/2019    ALKPHOS 226 07/01/2019     07/01/2019     07/01/2019       Disposition: acute rehab    Patient Instructions:     Medication List      CONTINUE taking these medications    amLODIPine 5 MG tablet  Commonly known as:  NORVASC     cyclobenzaprine 10 MG tablet  Commonly known as:  FLEXERIL  Take 1 tablet by mouth 3 times daily as needed for Muscle spasms     fluticasone 50 MCG/ACT nasal spray  Commonly known as:  FLONASE     LEXAPRO 10 MG tablet  Generic drug:  escitalopram     therapeutic multivitamin-minerals tablet     vitamin B-12 100 MCG tablet  Commonly known as:  CYANOCOBALAMIN     vitamin D 2000 units Caps capsule        ASK your doctor about these medications    oxyCODONE 5 MG immediate release tablet  Commonly known as:  ROXICODONE  Take 1 tablet by mouth every 4 hours as needed for Pain for up to 7 days. Ask about: Should I take this medication?              Activity: activity as tolerated   Diet: regular diet    Follow-up with Dr. Orion Merlin following rehab    Note that over 30 minutes was spent in preparing discharge papers, discussing discharge with patient, medication review, etc.    Signed:  Riya Horan DO  7/6/2019  10:53 AM

## 2019-07-06 NOTE — PROGRESS NOTES
Oral, Q4H PRN  tiZANidine (ZANAFLEX) tablet 4 mg, 4 mg, Oral, Q6H PRN  ceFAZolin (ANCEF) 2 g in dextrose 5 % 50 mL IVPB, 2 g, Intravenous, Once  amLODIPine (NORVASC) tablet 5 mg, 5 mg, Oral, Daily  cyclobenzaprine (FLEXERIL) tablet 10 mg, 10 mg, Oral, TID PRN  escitalopram (LEXAPRO) tablet 20 mg, 20 mg, Oral, Daily  fluticasone (FLONASE) 50 MCG/ACT nasal spray 1 spray, 1 spray, Nasal, Daily  therapeutic multivitamin-minerals 1 tablet, 1 tablet, Oral, Daily  vitamin B-12 (CYANOCOBALAMIN) tablet 50 mcg, 50 mcg, Oral, Daily  sodium chloride flush 0.9 % injection 10 mL, 10 mL, Intravenous, 2 times per day  sodium chloride flush 0.9 % injection 10 mL, 10 mL, Intravenous, PRN  magnesium hydroxide (MILK OF MAGNESIA) 400 MG/5ML suspension 30 mL, 30 mL, Oral, Daily PRN    ASSESSMENT:   · 77year old female s/p evacuation of cervical epidural hematoma - stable    PLAN:  · PT/OT  · WBAT  · Cervical collar x 3 months  · Pain control  · Drain removed      Electronically signed by Rexanne Aase, PA on 7/6/2019 at 7:07 AM

## 2019-07-07 LAB
ANION GAP SERPL CALCULATED.3IONS-SCNC: 15 MMOL/L (ref 7–16)
BASOPHILS ABSOLUTE: 0.06 E9/L (ref 0–0.2)
BASOPHILS RELATIVE PERCENT: 0.5 % (ref 0–2)
BUN BLDV-MCNC: 8 MG/DL (ref 8–23)
CALCIUM SERPL-MCNC: 9.6 MG/DL (ref 8.6–10.2)
CHLORIDE BLD-SCNC: 95 MMOL/L (ref 98–107)
CO2: 27 MMOL/L (ref 22–29)
CREAT SERPL-MCNC: 0.5 MG/DL (ref 0.5–1)
EOSINOPHILS ABSOLUTE: 0.24 E9/L (ref 0.05–0.5)
EOSINOPHILS RELATIVE PERCENT: 1.9 % (ref 0–6)
GFR AFRICAN AMERICAN: >60
GFR NON-AFRICAN AMERICAN: >60 ML/MIN/1.73
GLUCOSE BLD-MCNC: 115 MG/DL (ref 74–99)
HCT VFR BLD CALC: 37.5 % (ref 34–48)
HEMOGLOBIN: 11.9 G/DL (ref 11.5–15.5)
IMMATURE GRANULOCYTES #: 0.12 E9/L
IMMATURE GRANULOCYTES %: 0.9 % (ref 0–5)
LYMPHOCYTES ABSOLUTE: 3.14 E9/L (ref 1.5–4)
LYMPHOCYTES RELATIVE PERCENT: 24.3 % (ref 20–42)
MCH RBC QN AUTO: 28.6 PG (ref 26–35)
MCHC RBC AUTO-ENTMCNC: 31.7 % (ref 32–34.5)
MCV RBC AUTO: 90.1 FL (ref 80–99.9)
MONOCYTES ABSOLUTE: 1.19 E9/L (ref 0.1–0.95)
MONOCYTES RELATIVE PERCENT: 9.2 % (ref 2–12)
NEUTROPHILS ABSOLUTE: 8.18 E9/L (ref 1.8–7.3)
NEUTROPHILS RELATIVE PERCENT: 63.2 % (ref 43–80)
PDW BLD-RTO: 13.7 FL (ref 11.5–15)
PLATELET # BLD: 254 E9/L (ref 130–450)
PMV BLD AUTO: 11.1 FL (ref 7–12)
POTASSIUM REFLEX MAGNESIUM: 3.9 MMOL/L (ref 3.5–5)
RBC # BLD: 4.16 E12/L (ref 3.5–5.5)
SODIUM BLD-SCNC: 137 MMOL/L (ref 132–146)
WBC # BLD: 12.9 E9/L (ref 4.5–11.5)

## 2019-07-07 PROCEDURE — 80048 BASIC METABOLIC PNL TOTAL CA: CPT

## 2019-07-07 PROCEDURE — 97530 THERAPEUTIC ACTIVITIES: CPT

## 2019-07-07 PROCEDURE — 97162 PT EVAL MOD COMPLEX 30 MIN: CPT

## 2019-07-07 PROCEDURE — 6370000000 HC RX 637 (ALT 250 FOR IP): Performed by: INTERNAL MEDICINE

## 2019-07-07 PROCEDURE — 36415 COLL VENOUS BLD VENIPUNCTURE: CPT

## 2019-07-07 PROCEDURE — 1280000000 HC REHAB R&B

## 2019-07-07 PROCEDURE — 97166 OT EVAL MOD COMPLEX 45 MIN: CPT

## 2019-07-07 PROCEDURE — 6370000000 HC RX 637 (ALT 250 FOR IP): Performed by: PHYSICAL MEDICINE & REHABILITATION

## 2019-07-07 PROCEDURE — 85025 COMPLETE CBC W/AUTO DIFF WBC: CPT

## 2019-07-07 PROCEDURE — 97535 SELF CARE MNGMENT TRAINING: CPT

## 2019-07-07 RX ADMIN — OXYCODONE HYDROCHLORIDE 10 MG: 10 TABLET ORAL at 08:13

## 2019-07-07 RX ADMIN — ACETAMINOPHEN 650 MG: 325 TABLET, FILM COATED ORAL at 19:07

## 2019-07-07 RX ADMIN — DOCUSATE SODIUM 283 MG: 283 LIQUID RECTAL at 10:00

## 2019-07-07 RX ADMIN — FLUTICASONE PROPIONATE 1 SPRAY: 50 SPRAY, METERED NASAL at 08:14

## 2019-07-07 RX ADMIN — ESCITALOPRAM OXALATE 20 MG: 10 TABLET, FILM COATED ORAL at 08:14

## 2019-07-07 RX ADMIN — TIZANIDINE 4 MG: 4 TABLET ORAL at 08:14

## 2019-07-07 RX ADMIN — MAGNESIUM HYDROXIDE 30 ML: 400 SUSPENSION ORAL at 08:22

## 2019-07-07 RX ADMIN — ACETAMINOPHEN 650 MG: 325 TABLET, FILM COATED ORAL at 08:13

## 2019-07-07 RX ADMIN — DOCUSATE SODIUM 100 MG: 100 CAPSULE, LIQUID FILLED ORAL at 21:15

## 2019-07-07 RX ADMIN — AMLODIPINE BESYLATE 5 MG: 5 TABLET ORAL at 08:14

## 2019-07-07 RX ADMIN — VITAM B12 50 MCG: 100 TAB at 08:14

## 2019-07-07 RX ADMIN — DOCUSATE SODIUM 100 MG: 100 CAPSULE, LIQUID FILLED ORAL at 08:17

## 2019-07-07 RX ADMIN — MULTIPLE VITAMINS W/ MINERALS TAB 1 TABLET: TAB at 08:14

## 2019-07-07 ASSESSMENT — PAIN DESCRIPTION - DESCRIPTORS
DESCRIPTORS: ACHING;DISCOMFORT;TENDER
DESCRIPTORS: RADIATING;PRESSURE;DISCOMFORT

## 2019-07-07 ASSESSMENT — PAIN DESCRIPTION - PAIN TYPE: TYPE: CHRONIC PAIN

## 2019-07-07 ASSESSMENT — PAIN DESCRIPTION - PROGRESSION
CLINICAL_PROGRESSION: NOT CHANGED
CLINICAL_PROGRESSION: NOT CHANGED

## 2019-07-07 ASSESSMENT — PAIN SCALES - GENERAL
PAINLEVEL_OUTOF10: 7
PAINLEVEL_OUTOF10: 2
PAINLEVEL_OUTOF10: 7
PAINLEVEL_OUTOF10: 7
PAINLEVEL_OUTOF10: 0
PAINLEVEL_OUTOF10: 2

## 2019-07-07 ASSESSMENT — PAIN DESCRIPTION - LOCATION
LOCATION: NECK
LOCATION: NECK

## 2019-07-07 ASSESSMENT — PAIN DESCRIPTION - ONSET
ONSET: ON-GOING
ONSET: ON-GOING

## 2019-07-07 ASSESSMENT — PAIN DESCRIPTION - ORIENTATION
ORIENTATION: RIGHT;LEFT
ORIENTATION: RIGHT

## 2019-07-07 ASSESSMENT — 9 HOLE PEG TEST
TESTTIME_SECONDS: 4.5
TESTTIME_SECONDS: 2.45

## 2019-07-07 ASSESSMENT — PAIN - FUNCTIONAL ASSESSMENT: PAIN_FUNCTIONAL_ASSESSMENT: PREVENTS OR INTERFERES SOME ACTIVE ACTIVITIES AND ADLS

## 2019-07-07 ASSESSMENT — PAIN DESCRIPTION - FREQUENCY
FREQUENCY: CONTINUOUS
FREQUENCY: CONTINUOUS

## 2019-07-07 NOTE — H&P
[address]                  [address] [facility]  [address]                              HISTORY AND PHYSICAL    PATIENT NAME: La Cummings                       :        1950  MED REC NO:   [id number]                            ROOM:       8853  ACCOUNT NO:   [de-identified]                           ADMIT DATE: 2019  PROVIDER:     Yokasta Acosta MD    REHAB HISTORY AND PHYSICAL    CHIEF COMPLAINT:  Weakness. HISTORY OF PRESENT ILLNESS:  The patient had a cervical laminectomy from  C3-C6 on 2019. She was discharged to a skilled nursing facility,  but the next day developed increased weakness. She had an episode where  she slid to the floor. She was brought back to [facility] on  2019 and on further evaluation was found to have an epidural  hematoma. She underwent further surgery on 2019. She tolerated  that well. She still has weakness in all four extremities, but a little  more in the uppers and the central cord type pattern. She is felt to be  a good candidate for further rehab. PAST MEDICAL HISTORY:  1. Chronic anxiety and depression. 2.  Osteoarthritis. ALLERGIES:  SULFA. CURRENT MEDICATIONS:  Norvasc, Colace, Lexapro, B12, Flexeril, and  oxycodone as needed for pain. HABITS:  No smoking or alcohol. REVIEW OF SYSTEMS:  Negative for prior HEENT problems. Negative for  prior cardiac or pulmonary problems. Negative for prior GI or   problems, although she is having difficulty with both now. Orthopedic  is positive for osteoarthritis. Neurologic is positive for the current  deficits. PHYSICAL EXAMINATION:  GENERAL:  Well-nourished, well-developed white female in no acute  distress. HEENT:  Head is normocephalic, atraumatic. Eyes:  Pupils equal, round,  reactive to light. NECK:  Rigid cervical collar. LUNGS:  Clear to P and A. HEART:  Regular rate and rhythm.   S1, S2 normal.  No murmurs

## 2019-07-08 PROCEDURE — 97535 SELF CARE MNGMENT TRAINING: CPT

## 2019-07-08 PROCEDURE — 1280000000 HC REHAB R&B

## 2019-07-08 PROCEDURE — 97530 THERAPEUTIC ACTIVITIES: CPT

## 2019-07-08 PROCEDURE — 97110 THERAPEUTIC EXERCISES: CPT | Performed by: OCCUPATIONAL THERAPY ASSISTANT

## 2019-07-08 PROCEDURE — 97110 THERAPEUTIC EXERCISES: CPT

## 2019-07-08 PROCEDURE — 97112 NEUROMUSCULAR REEDUCATION: CPT

## 2019-07-08 PROCEDURE — 97535 SELF CARE MNGMENT TRAINING: CPT | Performed by: OCCUPATIONAL THERAPY ASSISTANT

## 2019-07-08 PROCEDURE — 97530 THERAPEUTIC ACTIVITIES: CPT | Performed by: OCCUPATIONAL THERAPY ASSISTANT

## 2019-07-08 PROCEDURE — 6370000000 HC RX 637 (ALT 250 FOR IP): Performed by: INTERNAL MEDICINE

## 2019-07-08 RX ADMIN — ESCITALOPRAM OXALATE 20 MG: 10 TABLET, FILM COATED ORAL at 08:08

## 2019-07-08 RX ADMIN — DOCUSATE SODIUM 100 MG: 100 CAPSULE, LIQUID FILLED ORAL at 21:07

## 2019-07-08 RX ADMIN — AMLODIPINE BESYLATE 5 MG: 5 TABLET ORAL at 08:08

## 2019-07-08 RX ADMIN — OXYCODONE HYDROCHLORIDE 10 MG: 10 TABLET ORAL at 16:33

## 2019-07-08 RX ADMIN — OXYCODONE HYDROCHLORIDE 10 MG: 10 TABLET ORAL at 21:07

## 2019-07-08 RX ADMIN — DOCUSATE SODIUM 100 MG: 100 CAPSULE, LIQUID FILLED ORAL at 08:08

## 2019-07-08 RX ADMIN — FLUTICASONE PROPIONATE 1 SPRAY: 50 SPRAY, METERED NASAL at 08:07

## 2019-07-08 RX ADMIN — CYCLOBENZAPRINE 10 MG: 10 TABLET, FILM COATED ORAL at 21:07

## 2019-07-08 RX ADMIN — MULTIPLE VITAMINS W/ MINERALS TAB 1 TABLET: TAB at 08:09

## 2019-07-08 RX ADMIN — VITAM B12 50 MCG: 100 TAB at 08:08

## 2019-07-08 ASSESSMENT — PAIN DESCRIPTION - PAIN TYPE: TYPE: CHRONIC PAIN

## 2019-07-08 ASSESSMENT — PAIN DESCRIPTION - ONSET: ONSET: ON-GOING

## 2019-07-08 ASSESSMENT — PAIN DESCRIPTION - FREQUENCY: FREQUENCY: CONTINUOUS

## 2019-07-08 ASSESSMENT — PAIN DESCRIPTION - DESCRIPTORS: DESCRIPTORS: ACHING;DISCOMFORT

## 2019-07-08 ASSESSMENT — PAIN SCALES - GENERAL
PAINLEVEL_OUTOF10: 9
PAINLEVEL_OUTOF10: 4
PAINLEVEL_OUTOF10: 9
PAINLEVEL_OUTOF10: 5

## 2019-07-08 ASSESSMENT — PAIN DESCRIPTION - LOCATION: LOCATION: HEAD;NECK

## 2019-07-08 ASSESSMENT — PAIN DESCRIPTION - ORIENTATION: ORIENTATION: RIGHT;LEFT

## 2019-07-08 NOTE — PROGRESS NOTES
Occupational Therapy  Therapeutic Recreation Assessment     LEISURE INTEREST SURVEY               Key: P = Past Interest C = Current Interest F = Future Interest     Arts/Crafts:  ___Mechanical  ___Woodworking    ___ Painting   ___Floral arranging ___ Ceramics         _ __ Knitting                                                     ___ Crocheting        _C__Other: __Embroider_________      Cooking:  Barbar Simmer ___Cooking    ___   Other: _______   Comments:       Games:  _C__Cards  ___Darts  ___Board games    ___Word games  ___Crossword puzzles    ___Seek-n-find/jumble                   ___Other: _________      Horticulture:  ___Vegetables  _C__Flowers  ___House plants                                                     ___Other: ___________      House/Yard Care:  ___Ironing  ___Laundry  ___Cleaning                                                      ___Repairs              ___Lawn care                                                     ___Other: ___________      Music Listening/Playing:  ___Classical       ___Big Band       ___Rock-n-Roll                                                     _C__Country          ___R&B             ___Gospel/Hymns                                                     ___Other: ___________      Outdoor Activities:  ___Hunting          ___Fishing          _P__Camping                                                     ___Other: ___________      Pets/Animals:  _P__Dogs             ___Cats                ___Fish                                                     ___Birds             ___Livestock         ___Other: _________    Reading:   ___Newspapers  ___Magazines ___Other:stories                                                     ___Other: ___________      Hinduism Activities:  Pentecostalism: ___________   Maryetta Emelina Activities: ___________      Shopping:   ___Grocery  ___Clothing  ___Flea market     ___Other: ___________      Socialization: ___Family  _C__Friends  ___Neighbors       ___Church

## 2019-07-08 NOTE — PROGRESS NOTES
Occupational Therapy  OCCUPATIONAL THERAPY DAILY NOTE    Date:2019  Patient Name: Irais Loomis  MRN: [id number]  : 1950  Room: [address] [place] [address]     DIAGNOSIS: ISCI  Pertinent History: To ED on 19 for numbness/tingling. s/p C3-6 PCF/laminectomy on 19 after fall before being discharged to SNF. S/P cervical wound exploration/evacuation of posterior cervical epidural hematoma on 7/3/19. PMH includes anxiety, depression, KRISTAL R LE, HTN  Precautions: Cervical collar AAT, c-spine precautions, spinal neutral mechanics, falls   Social History: Pt lives alone in a 2 floor plan 3-4 steps and 0 rails to enter. 12 steps upstairs with right HR. Prior to admission pt walked with no device but owns ww. Functional Assessment:   Date Status AE  Comments   Feeding 19 Minimal Assist      Grooming 19 Moderate Assist      Bathing 19 Maximal Assist   Bed level   UB Dressing 19 Mod A     LB Dressing 19 Maximal Assist  Reacher & sock aide,  shoehorn vc's to use properly  Pt. Donned shoes with Min A to use LH shoehorn. Homemaking 19         Functional Transfers / Balance:   Date Status DME  Comments   Sit Balance 29 Stand by Assist      Stand Balance 19 Minimal Assist  rw    [] Tub  [] Shower   Transfer 19 NT      Commode   Transfer 19 Min-Mod A rw vc's for safety   Functional   Mobility 19 Min A rw vc's for safety   Other:         Functional Exercises / Activity:   Pt demo Min A sit<>stand. Pt demo Min A functional mobility with a rw short distance & vc's for walker management & safety when turning. Pt demo Min-Mod A commode trf with a rw to a 3:1. Pt require Min A to ambulate to a commode using a rw & Min A for pants management. Pt tolerated theraputty exercises rolling red putty out & finding 10 pennies. Pt educated on how to use AE to facilitate LB dressing.  Pt hindered by BUE weakness  & decreased sensation BUE    Tabletop ax with 5# wt'd box to increase AROM and strength of BUE's. 25 reps x3 planes. Func FM ax to increase FMC and prehension of BUE's. Sensory / Neuromuscular Re-Education:      Cognitive Skills:   Status Comments   Problem   Solving fair  During functional tasks   Memory fair + \"   Sequencing fair  \"   Safety fair  \"     Visual Perception:    Education:  Pt educated with regards to walker safety during transfers & mobility. Pt educated on how to use reacher & sock aide for LB dressing    [] Family teach completed on:    Pain Level: Pt did c/o all over body pain    Additional Notes:       Patient has made good  progress during treatment sessions toward set goals. Therapy emphasis to obtain goals:ADL retraining, transfer training, home management, endurance/balance retraining, therex, pt/family education      [x] Continue with current OT Plan of care. [] Prepare for Discharge     DISCHARGE RECOMMENDATIONS  Recommended DME:  Rw, 3:1, tub bench, walker tray    Post Discharge Care:   []Home Independently  []Home with 24hr Care / Supervision []Home with Partial Supervision []Home with Home Health OT []Home with Out Pt OT []Other: ___   Comments:         Time in Time out Tx Time Breakdown  Variance:   First Session  322 1000 [x] Individual Tx- 45  [] Concurrent Tx -  [] Co-Tx -   [] Group Tx -   [] Time Missed -     Second Session [id number] [] Individual Tx-   [x] Concurrent Tx -45 mins  [] Co-Tx -   [] Group Tx -   [] Time Missed -     Third Session    [] Individual Tx-   [] Concurrent Tx -  [] Co-Tx -   [] Group Tx -   [] Time Missed -         Total Tx Time  90 mins   [facility] RAMEY/L [id number]  [facility] OTR/L [id number]    I have read & agree with the above status.     [facility] OTR/L [id number]

## 2019-07-08 NOTE — PROGRESS NOTES
Occupational Therapy  OCCUPATIONAL THERAPY DAILY NOTE    Date:2019  Patient Name: Felicia Ruiz  MRN: [id number]  : 1950  Room: [address] [place] [address]     DIAGNOSIS: ISCI  Pertinent History: To ED on 19 for numbness/tingling. s/p C3-6 PCF/laminectomy on 19 after fall before being discharged to SNF. S/P cervical wound exploration/evacuation of posterior cervical epidural hematoma on 7/3/19. PMH includes anxiety, depression, KRISTAL R LE, HTN  Precautions: Cervical collar AAT, c-spine precautions, spinal neutral mechanics, falls   Social History: Pt lives alone in a 2 floor plan 3-4 steps and 0 rails to enter. 12 steps upstairs with right HR. Prior to admission pt walked with no device but owns ww. Functional Assessment:   Date Status AE  Comments   Feeding 19 Minimal Assist      Grooming 19 Moderate Assist      Bathing 19 Maximal Assist   Bed level   UB Dressing 19 Mod A     LB Dressing 19 Maximal Assist  Reacher & sock aide vc's to use properly   Homemaking 19         Functional Transfers / Balance:   Date Status DME  Comments   Sit Balance 29 Stand by Assist      Stand Balance 19 Minimal Assist  rw    [] Tub  [] Shower   Transfer 19 NT      Commode   Transfer 19 Min-Mod A rw vc's for safety   Functional   Mobility 19 Min A rw vc's for safety   Other:         Functional Exercises / Activity:   Pt demo Min A sit<>stand. Pt demo Min A functional mobility with a rw short distance & vc's for walker management & safety when turning. Pt demo Min-Mod A commode trf with a rw to a 3:1. Pt require Min A to ambulate to a commode using a rw & Min A for pants management. Pt tolerated theraputty exercises rolling red putty out & finding 10 pennies. Pt educated on how to use AE to facilitate LB dressing.  Pt hindered by BUE weakness  & decreased sensation BUE    Sensory / Neuromuscular Re-Education:      Cognitive Skills:   Status Comments   Problem   Solving fair  During

## 2019-07-08 NOTE — PROGRESS NOTES
Physical Therapy    Facility/Department: [address] REHAB  Daily Treatment Note    NAME: Sahra Richter  : 1950  MRN: [id number]    Date of Service: 2019    Supervising Therapist: Gayl Meckel, PT, DPT    ROOM: [address] [place] [address]  DIAGNOSIS: incomplete spinal cord injury   PMH: To ED on 19 for numbness/tingling. s/p C3-6 PCF/laminectomy on 19 after fall before being discharged to SNF. S/P cervical wound exploration/evacuation of posterior cervical epidural hematoma on 7/3/19. PMH includes anxiety, depression, KRISTAL R LE, HTN    PRECAUTIONS: Cervical collar AAT, c-spine precautions, spinal neutral mechanics, falls     Social: Pt lives alone in a 2 floor plan 3-4 steps and 0 rails to enter. 12 steps upstairs with right HR. Prior to admission pt walked with no device but owns ww. Initial Evaluation  19 AM     PM   Short Term Goals Long Term Goals    Was pt agreeable to Eval/treatment? yes yes yes     Does pt have pain?  /10 neck pain  Pt c/o moderate neck pain Pt c/o moderate neck pain     Bed Mobility  Rolling: min/modA  Supine to sit: modA  Sit to supine: maxA  Scooting: modA Supine>Sit MaxA  Sit>Supine ModA  Rolling ModA  Scooting ModA    (mat table) NT sup Mod I   Transfers Sit to stand: mod/maxA  Stand to sit: mod/maxA  Stand pivot: maxA with ww Sit<>Stand Jennifer  Stand-pivot ModA with [employer] NT Jennifer with AAD Sup with AAD   Ambulation    50 feet with ww with Jennifer 125 feet x 2 reps with [employer] with Jennifer/ModA 75 feet x 1 rep with [employer] with Jennifer/ModA >150 feet with AAD with sba >250 feet with AAD with sup   Wheel Chair Mobility Nt NT NT     Car Transfers NT NT ModA/MaxA Jennifer Sup    Stair negotiation: ascended and descended NT NT 4 steps with 2 HR with ModA (descend BW) 4 steps with one rail with Jennifer >12 steps with one rail with sup   Curb Step:   ascended and descended NT NT NT 4 inch step with AAD and Jennifer 4 inch step with AAD and sup    BLE ROM WFL WNL WNL     BLE Strength Bilateral LE: 4-/5 Bilateral LE:

## 2019-07-08 NOTE — PROGRESS NOTES
Occupational Therapy     07/08/19 4657   Attendance   Activity Cards   Participation Active participation   [place] Demonstrates ability to complete social goals   Social Skills Cooperates with others in group activity   Leisure Education Demonstrates knowledge of benefits of leisure involvement  (Identified thinking as a benefit.)   Time Spent With Patient   Minutes 15

## 2019-07-09 ENCOUNTER — APPOINTMENT (OUTPATIENT)
Dept: CT IMAGING | Age: 69
DRG: 949 | End: 2019-07-09
Attending: PHYSICAL MEDICINE & REHABILITATION
Payer: MEDICARE

## 2019-07-09 LAB — ANAEROBIC CULTURE: NORMAL

## 2019-07-09 PROCEDURE — 97110 THERAPEUTIC EXERCISES: CPT

## 2019-07-09 PROCEDURE — 72125 CT NECK SPINE W/O DYE: CPT

## 2019-07-09 PROCEDURE — 6370000000 HC RX 637 (ALT 250 FOR IP): Performed by: INTERNAL MEDICINE

## 2019-07-09 PROCEDURE — 97530 THERAPEUTIC ACTIVITIES: CPT

## 2019-07-09 PROCEDURE — 1280000000 HC REHAB R&B

## 2019-07-09 RX ADMIN — DOCUSATE SODIUM 100 MG: 100 CAPSULE, LIQUID FILLED ORAL at 08:40

## 2019-07-09 RX ADMIN — MULTIPLE VITAMINS W/ MINERALS TAB 1 TABLET: TAB at 08:40

## 2019-07-09 RX ADMIN — FLUTICASONE PROPIONATE 1 SPRAY: 50 SPRAY, METERED NASAL at 08:41

## 2019-07-09 RX ADMIN — ESCITALOPRAM OXALATE 20 MG: 10 TABLET, FILM COATED ORAL at 08:40

## 2019-07-09 RX ADMIN — OXYCODONE HYDROCHLORIDE 10 MG: 10 TABLET ORAL at 21:21

## 2019-07-09 RX ADMIN — AMLODIPINE BESYLATE 5 MG: 5 TABLET ORAL at 08:40

## 2019-07-09 RX ADMIN — DOCUSATE SODIUM 100 MG: 100 CAPSULE, LIQUID FILLED ORAL at 21:21

## 2019-07-09 RX ADMIN — VITAM B12 50 MCG: 100 TAB at 08:40

## 2019-07-09 ASSESSMENT — PAIN DESCRIPTION - DESCRIPTORS: DESCRIPTORS: ACHING

## 2019-07-09 ASSESSMENT — PAIN DESCRIPTION - LOCATION: LOCATION: HEAD;NECK

## 2019-07-09 ASSESSMENT — PAIN SCALES - GENERAL
PAINLEVEL_OUTOF10: 0
PAINLEVEL_OUTOF10: 7
PAINLEVEL_OUTOF10: 0
PAINLEVEL_OUTOF10: 0

## 2019-07-09 NOTE — PROGRESS NOTES
Neuromuscular Re-Education:        Cognitive Skills:    Status Comments   Problem   Solving fair  During functional tasks   Memory fair + \"   Sequencing fair  \"   Safety fair  \"              Scheduled Meds:   docusate sodium  1 enema Rectal Daily    docusate sodium  100 mg Oral BID    amLODIPine  5 mg Oral Daily    escitalopram  20 mg Oral Daily    fluticasone  1 spray Nasal Daily    therapeutic multivitamin-minerals  1 tablet Oral Daily    vitamin B-12  50 mcg Oral Daily     Continuous Infusions:  PRN Meds:oxyCODONE **OR** oxyCODONE, cyclobenzaprine, tiZANidine, acetaminophen, magnesium hydroxide  I/O last 3 completed shifts: In: 1940 [P.O.:1940]  Out: 2000 [Urine:2000]  I/O this shift:  In: -   Out: 2150 [Urine:2150]    Labs reviewed  CBC:   Recent Labs     07/07/19  0520   WBC 12.9*   HGB 11.9        BMP:    Recent Labs     07/07/19  0520      K 3.9   CL 95*   CO2 27   BUN 8   CREATININE 0.5   GLUCOSE 115*     Hepatic: No results for input(s): AST, ALT, ALB, BILITOT, ALKPHOS in the last 72 hours. BNP: No results for input(s): BNP in the last 72 hours. Lipids: No results for input(s): CHOL, HDL in the last 72 hours. Invalid input(s): LDLCALCU  INR: No results for input(s): INR in the last 72 hours. Assessment/  Patient Active Problem List:     Cervical stenosis of spinal canal     Essential hypertension     Paresthesia     Epidural hematoma (HCC)     Incomplete spinal cord injury at C1-C4 level without bone injury ([facility] [address]      Plan/  1. Continue rehab program  2. Continue focus on strengthening, mobility and independence  3.  Continue close neuro follow up  4. dvt prophylaxis          Patrick Sanchez MD

## 2019-07-09 NOTE — PROGRESS NOTES
Perception:    Education:  Pt educated on hand placement and techniques during sit to stands    [] Family teach completed on:    Pain Level: Pt did c/o all over body pain    Additional Notes:       Patient has made good  progress during treatment sessions toward set goals. Therapy emphasis to obtain goals:ADL retraining, transfer training, home management, endurance/balance retraining, therex, pt/family education      [x] Continue with current OT Plan of care. [] Prepare for Discharge     DISCHARGE RECOMMENDATIONS  Recommended DME:  Rw, 3:1, tub bench, walker tray    Post Discharge Care:   []Home Independently  []Home with 24hr Care / Supervision []Home with Partial Supervision []Home with Home Health OT []Home with Out Pt OT []Other: ___   Comments:         Time in Time out Tx Time Breakdown  Variance:   First Session  [phone] [x] Individual Tx- 45  [] Concurrent Tx -  [] Co-Tx -   [] Group Tx -   [] Time Missed -     Second Session   [] Individual Tx-   [] Concurrent Tx -  [] Co-Tx -   [] Group Tx -   [x] Time Missed - 45 Mins On hold per nursing- waiting on CT scan. Third Session    [] Individual Tx-   [] Concurrent Tx -  [] Co-Tx -   [] Group Tx -   [] Time Missed -         Total Tx Time 45 mins   Maggie L. Murleen Krabbe RAMEY/L [facility] RAMEY/L [id number]    I have read & agree with the above status.     Alvarez Michael OTR/L [id number]

## 2019-07-10 PROCEDURE — 97110 THERAPEUTIC EXERCISES: CPT

## 2019-07-10 PROCEDURE — 97112 NEUROMUSCULAR REEDUCATION: CPT

## 2019-07-10 PROCEDURE — 1280000000 HC REHAB R&B

## 2019-07-10 PROCEDURE — 97530 THERAPEUTIC ACTIVITIES: CPT

## 2019-07-10 PROCEDURE — 6370000000 HC RX 637 (ALT 250 FOR IP): Performed by: INTERNAL MEDICINE

## 2019-07-10 RX ADMIN — ESCITALOPRAM OXALATE 20 MG: 10 TABLET, FILM COATED ORAL at 08:39

## 2019-07-10 RX ADMIN — MULTIPLE VITAMINS W/ MINERALS TAB 1 TABLET: TAB at 08:39

## 2019-07-10 RX ADMIN — FLUTICASONE PROPIONATE 1 SPRAY: 50 SPRAY, METERED NASAL at 08:41

## 2019-07-10 RX ADMIN — OXYCODONE HYDROCHLORIDE 10 MG: 10 TABLET ORAL at 21:14

## 2019-07-10 RX ADMIN — VITAM B12 50 MCG: 100 TAB at 08:39

## 2019-07-10 RX ADMIN — AMLODIPINE BESYLATE 5 MG: 5 TABLET ORAL at 08:39

## 2019-07-10 RX ADMIN — DOCUSATE SODIUM 100 MG: 100 CAPSULE, LIQUID FILLED ORAL at 21:11

## 2019-07-10 RX ADMIN — DOCUSATE SODIUM 100 MG: 100 CAPSULE, LIQUID FILLED ORAL at 08:39

## 2019-07-10 ASSESSMENT — PAIN SCALES - GENERAL
PAINLEVEL_OUTOF10: 0
PAINLEVEL_OUTOF10: 0
PAINLEVEL_OUTOF10: 3
PAINLEVEL_OUTOF10: 0
PAINLEVEL_OUTOF10: 6
PAINLEVEL_OUTOF10: 0

## 2019-07-10 ASSESSMENT — PAIN DESCRIPTION - DESCRIPTORS: DESCRIPTORS: ACHING;NUMBNESS

## 2019-07-10 ASSESSMENT — PAIN DESCRIPTION - FREQUENCY: FREQUENCY: INTERMITTENT

## 2019-07-10 ASSESSMENT — PAIN DESCRIPTION - PAIN TYPE: TYPE: SURGICAL PAIN

## 2019-07-10 ASSESSMENT — PAIN DESCRIPTION - LOCATION: LOCATION: NECK

## 2019-07-10 ASSESSMENT — PAIN DESCRIPTION - ORIENTATION: ORIENTATION: RIGHT;LEFT

## 2019-07-10 NOTE — PROGRESS NOTES
ascended and descended NT NT NT  4 steps with one rail with Jennifer >12 steps with one rail with sup   Curb Step:   ascended and descended NT NT NT 4 inch step with AAD and Jennifer 4 inch step with AAD and sup    BLE ROM WFL WNL      BLE Strength Bilateral LE: 4-/5 Bilateral LE: 4-/5      Balance  Sitting: sup  Standing: mod/maxA with ww Static and dynamic standing balance ModA/Maxa with [employer] Static and dynamic standing balance ModA/MaxA with [employer] and without AD     Date Family Teach Completed TBA       Is additional Family Teaching Needed? Y or N Y pending improvement Y Y     Hindering Progress Safety awareness, clonus/flexor withdrawal L LE, coordination Balance/ coordination and sensory deficits Balance/ coordination     PT recommended ELOS 4-5 weeks       Team's Discharge Plan        Therapist at Team Meeting          Therapeutic Exercise:   AM: NA  PM: Rolling Supine>Sidelying x 4 reps L and R  Supine bridging with BLE x 15 reps  Sit<>Stand transfer from EOM x 8 reps     Patient education  Pt educated on sliding board transfer technique to improve stability with transfers    Patient response to education:   Pt verbalized understanding Pt demonstrated skill Pt requires further education in this area   yes partial yes     Additional Comments: Pt exhibits improved dynamic standing balance/ability to stand compared to previous day. Inconsistent performance prompted review of sliding board transfer as alternative safe transfer technique. Pt has difficulty retaining and performing basic principles for safety. Pt does not attempt to correct LOB unless cued to do so with sliding board transfers. Manual assistance provided to maintain L hand grasp on [employer] with ambulation. Pt occasionally scissors and requires frequent cueing for safe speed and to maintain proper JAMES. Pt stands better from EOM without UE use. Pt tends to be distracted by attempt at UE use leading to LOB and inattention to BLE.  PM session activity completed without

## 2019-07-10 NOTE — PROGRESS NOTES
Department of Neurosurgery  Progress Note    CHIEF COMPLAINT: s/p evacuationof cervical  Epidural hematoma    SUBJECTIVE:   No neck pain. REVIEW OF SYSTEMS :  Constitutional: Negative for chills and fever. Neurological: Negative for dizziness, tremors and speech change. OBJECTIVE:   VITALS:  BP (!) 148/67   Pulse 73   Temp 98 °F (36.7 °C) (Oral)   Resp 16   Ht 5' 4\" (1.626 m)   Wt 187 lb (84.8 kg)   SpO2 92%   BMI 32.10 kg/m²   PHYSICAL:  CONSTITUTIONAL:  awake, alert, cooperative, no apparent distress, and appears stated age. Bilateral arm and leg weakness: bilateral leg power 4+/5, right arm 4/5, left arm 3/5.   Op site dry    DATA:  CBC:   Lab Results   Component Value Date    WBC 12.9 07/07/2019    RBC 4.16 07/07/2019    HGB 11.9 07/07/2019    HCT 37.5 07/07/2019    MCV 90.1 07/07/2019    MCH 28.6 07/07/2019    MCHC 31.7 07/07/2019    RDW 13.7 07/07/2019     07/07/2019    MPV 11.1 07/07/2019     BMP:    Lab Results   Component Value Date     07/07/2019    K 3.9 07/07/2019    CL 95 07/07/2019    CO2 27 07/07/2019    BUN 8 07/07/2019    LABALBU 3.6 07/01/2019    CREATININE 0.5 07/07/2019    CALCIUM 9.6 07/07/2019    GFRAA >60 07/07/2019    LABGLOM >60 07/07/2019    GLUCOSE 115 07/07/2019     PT/INR:    Lab Results   Component Value Date    PROTIME 11.2 06/21/2019    INR 1.0 06/21/2019     PTT:  No results found for: APTT, PTT[APTT}    Current Inpatient Medications  Current Facility-Administered Medications: docusate sodium (ENEMEEZ) enema 283 mg, 1 enema, Rectal, Daily  docusate sodium (COLACE) capsule 100 mg, 100 mg, Oral, BID  oxyCODONE (ROXICODONE) immediate release tablet 5 mg, 5 mg, Oral, Q4H PRN **OR** oxyCODONE HCl (OXY-IR) immediate release tablet 10 mg, 10 mg, Oral, Q4H PRN  amLODIPine (NORVASC) tablet 5 mg, 5 mg, Oral, Daily  cyclobenzaprine (FLEXERIL) tablet 10 mg, 10 mg, Oral, TID PRN  escitalopram (LEXAPRO) tablet 20 mg, 20 mg, Oral, Daily  fluticasone (FLONASE) 50 MCG/ACT

## 2019-07-10 NOTE — PROGRESS NOTES
minimal pulley movement with assist. Pt did tolerate PROM/AAROM in shoulders & elbows with F+ tolerance. Pt encouraged to activate muscles & movement in planes in which she has movement. Pt demo Min A supine>sit with HOB elevated & vc's to sequence. Pt demo Min A stand pivot trf bed>wc. Pt toelrated FMC/GMC activities using her B hands with F tolerance. Sensory / Neuromuscular Re-Education:      Cognitive Skills:   Status Comments   Problem   Solving fair  During functional tasks   Memory fair + \"   Sequencing fair  \"   Safety fair  \"     Visual Perception:    Education:  Pt educated on hand placement and techniques during sit to stands    [] Family teach completed on:    Pain Level: Pt did c/o all over body pain    Additional Notes:       Patient has made good  progress during treatment sessions toward set goals. Therapy emphasis to obtain goals:ADL retraining, transfer training, home management, endurance/balance retraining, therex, pt/family education      [x] Continue with current OT Plan of care.   [] Prepare for Discharge     DISCHARGE RECOMMENDATIONS  Recommended DME:  Rw, 3:1, tub bench, walker tray    Post Discharge Care:   []Home Independently  []Home with 24hr Care / Supervision []Home with Partial Supervision []Home with Home Health OT []Home with Out Pt OT []Other: ___   Comments:         Time in Time out Tx Time Breakdown  Variance:   First Session  [phone] [x] Individual Tx- 45  [] Concurrent Tx -  [] Co-Tx -   [] Group Tx -   [] Time Missed -     Second Session [phone] [x] Individual Tx-   [] Concurrent Tx -  [] Co-Tx -   [] Group Tx -   [] Time Missed -  Mins    Third Session    [] Individual Tx-   [] Concurrent Tx -  [] Co-Tx -   [] Group Tx -   [] Time Missed -         Total Tx Time 90mins   Perry Shukla OTR/L [id number]

## 2019-07-11 PROCEDURE — 1280000000 HC REHAB R&B

## 2019-07-11 PROCEDURE — 97112 NEUROMUSCULAR REEDUCATION: CPT

## 2019-07-11 PROCEDURE — 6370000000 HC RX 637 (ALT 250 FOR IP): Performed by: PHYSICAL MEDICINE & REHABILITATION

## 2019-07-11 PROCEDURE — 6370000000 HC RX 637 (ALT 250 FOR IP): Performed by: INTERNAL MEDICINE

## 2019-07-11 PROCEDURE — 97110 THERAPEUTIC EXERCISES: CPT

## 2019-07-11 PROCEDURE — 97530 THERAPEUTIC ACTIVITIES: CPT

## 2019-07-11 RX ADMIN — DOCUSATE SODIUM 100 MG: 100 CAPSULE, LIQUID FILLED ORAL at 21:48

## 2019-07-11 RX ADMIN — VITAM B12 50 MCG: 100 TAB at 08:28

## 2019-07-11 RX ADMIN — ACETAMINOPHEN 650 MG: 325 TABLET, FILM COATED ORAL at 21:48

## 2019-07-11 RX ADMIN — CYCLOBENZAPRINE 10 MG: 10 TABLET, FILM COATED ORAL at 21:48

## 2019-07-11 RX ADMIN — DOCUSATE SODIUM 100 MG: 100 CAPSULE, LIQUID FILLED ORAL at 08:29

## 2019-07-11 RX ADMIN — FLUTICASONE PROPIONATE 1 SPRAY: 50 SPRAY, METERED NASAL at 08:30

## 2019-07-11 RX ADMIN — AMLODIPINE BESYLATE 5 MG: 5 TABLET ORAL at 08:28

## 2019-07-11 RX ADMIN — ESCITALOPRAM OXALATE 20 MG: 10 TABLET, FILM COATED ORAL at 08:28

## 2019-07-11 RX ADMIN — MULTIPLE VITAMINS W/ MINERALS TAB 1 TABLET: TAB at 08:28

## 2019-07-11 ASSESSMENT — PAIN SCALES - GENERAL
PAINLEVEL_OUTOF10: 0
PAINLEVEL_OUTOF10: 4

## 2019-07-11 ASSESSMENT — PAIN - FUNCTIONAL ASSESSMENT: PAIN_FUNCTIONAL_ASSESSMENT: PREVENTS OR INTERFERES SOME ACTIVE ACTIVITIES AND ADLS

## 2019-07-11 ASSESSMENT — PAIN DESCRIPTION - ONSET: ONSET: ON-GOING

## 2019-07-11 ASSESSMENT — PAIN DESCRIPTION - PROGRESSION: CLINICAL_PROGRESSION: NOT CHANGED

## 2019-07-11 ASSESSMENT — PAIN DESCRIPTION - ORIENTATION: ORIENTATION: POSTERIOR

## 2019-07-11 ASSESSMENT — PAIN DESCRIPTION - LOCATION: LOCATION: SHOULDER;NECK

## 2019-07-11 ASSESSMENT — PAIN DESCRIPTION - FREQUENCY: FREQUENCY: CONTINUOUS

## 2019-07-11 ASSESSMENT — PAIN DESCRIPTION - PAIN TYPE: TYPE: CHRONIC PAIN

## 2019-07-11 ASSESSMENT — PAIN DESCRIPTION - DESCRIPTORS: DESCRIPTORS: RADIATING;SHOOTING;DISCOMFORT

## 2019-07-11 NOTE — PROGRESS NOTES
rehab program  2. Increase activity as able  3. Maintain precautions per neurosurgery  4. Reviewed Dr. Karen Faria note from yesterday  5. Continue to monitor neuro status  6.  Continue dvt prophylaxis          Jayson Bagley MD

## 2019-07-11 NOTE — PROGRESS NOTES
Physical Therapy    Facility/Department: [facility]  Weekly Team note    NAME: Betsy Nielson  : 1950  MRN: [id number]    Date of Service: 2019    Supervising Therapist: Zach Grullon, PT, DPT    ROOM: [address] [place] [address]  DIAGNOSIS: incomplete spinal cord injury   PMH: To ED on 19 for numbness/tingling. s/p C3-6 PCF/laminectomy on 19 after fall before being discharged to SNF. S/P cervical wound exploration/evacuation of posterior cervical epidural hematoma on 7/3/19. PMH includes anxiety, depression, KRISTAL R LE, HTN    PRECAUTIONS: Cervical collar AAT, c-spine precautions, spinal neutral mechanics, falls     Social: Pt lives alone in a 2 floor plan 3-4 steps and 0 rails to enter. 12 steps upstairs with right HR. Prior to admission pt walked with no device but owns ww. Initial Evaluation  19 comments  Short Term Goals Long Term Goals    Was pt agreeable to Eval/treatment? yes yes      Does pt have pain?  4/10 neck pain  Pt c/o moderate neck pain      Bed Mobility  Rolling: min/modA  Supine to sit: modA  Sit to supine: maxA  Scooting: modA Rolling Jennifer  Supine to sit MaxA   Sit to supine MaxA  Scooting ModA Log roll sup Mod I   Transfers Sit to stand: mod/maxA  Stand to sit: mod/maxA  Stand pivot: maxA with ww Sit<>Stand ModA  Stand-pivot ModA with [employer]    Sliding board transferMinA x1 reps from [employer] table  assitance for w/c management and parts  Jennifer with AAD Sup with AAD   Ambulation    50 feet with ww with Jennifer 200 feet with [employer] and ModA/MaxA (WC follow) Narrow JAMES occ scissoring and B knee buckling >150 feet with AAD with sba >250 feet with AAD with sup   Wheel Chair Mobility Nt 300 feet propelling with BLE with supervision   400 feet with available extremities with Modified Independent   Car Transfers NT NT  Jennifer Sup    Stair negotiation: ascended and descended NT NT  4 steps with one rail with Jennifer >12 steps with one rail with sup   Curb Step:   ascended and descended NT NT  4 inch

## 2019-07-11 NOTE — PROGRESS NOTES
Missed -      Third Session    [] Individual Tx-   [] Concurrent Tx -  [] Co-Tx -   [] Group Tx -   [] Time Missed -         Total Tx Time 90 Mins   Ilya Radford OTR/L [address] [facility] RAMEY/L [id number]      I have read & agree with the above status.     Ilya Radford OTR/L [id number]

## 2019-07-12 PROCEDURE — 97110 THERAPEUTIC EXERCISES: CPT

## 2019-07-12 PROCEDURE — 97535 SELF CARE MNGMENT TRAINING: CPT

## 2019-07-12 PROCEDURE — 97530 THERAPEUTIC ACTIVITIES: CPT

## 2019-07-12 PROCEDURE — 1280000000 HC REHAB R&B

## 2019-07-12 PROCEDURE — 6370000000 HC RX 637 (ALT 250 FOR IP): Performed by: INTERNAL MEDICINE

## 2019-07-12 PROCEDURE — 97112 NEUROMUSCULAR REEDUCATION: CPT

## 2019-07-12 PROCEDURE — 6370000000 HC RX 637 (ALT 250 FOR IP): Performed by: PHYSICAL MEDICINE & REHABILITATION

## 2019-07-12 RX ADMIN — ACETAMINOPHEN 650 MG: 325 TABLET, FILM COATED ORAL at 09:07

## 2019-07-12 RX ADMIN — CYCLOBENZAPRINE 10 MG: 10 TABLET, FILM COATED ORAL at 21:27

## 2019-07-12 RX ADMIN — ESCITALOPRAM OXALATE 20 MG: 10 TABLET, FILM COATED ORAL at 09:08

## 2019-07-12 RX ADMIN — DOCUSATE SODIUM 100 MG: 100 CAPSULE, LIQUID FILLED ORAL at 21:27

## 2019-07-12 RX ADMIN — FLUTICASONE PROPIONATE 1 SPRAY: 50 SPRAY, METERED NASAL at 09:07

## 2019-07-12 RX ADMIN — OXYCODONE HYDROCHLORIDE 10 MG: 10 TABLET ORAL at 09:07

## 2019-07-12 RX ADMIN — DOCUSATE SODIUM 100 MG: 100 CAPSULE, LIQUID FILLED ORAL at 09:07

## 2019-07-12 RX ADMIN — VITAM B12 50 MCG: 100 TAB at 09:08

## 2019-07-12 RX ADMIN — AMLODIPINE BESYLATE 5 MG: 5 TABLET ORAL at 09:08

## 2019-07-12 RX ADMIN — MULTIPLE VITAMINS W/ MINERALS TAB 1 TABLET: TAB at 09:08

## 2019-07-12 ASSESSMENT — PAIN DESCRIPTION - LOCATION: LOCATION: NECK;SHOULDER

## 2019-07-12 ASSESSMENT — PAIN DESCRIPTION - DESCRIPTORS: DESCRIPTORS: ACHING;DULL;SORE

## 2019-07-12 ASSESSMENT — PAIN SCALES - GENERAL
PAINLEVEL_OUTOF10: 7
PAINLEVEL_OUTOF10: 0

## 2019-07-12 ASSESSMENT — PAIN DESCRIPTION - FREQUENCY: FREQUENCY: INTERMITTENT

## 2019-07-12 ASSESSMENT — PAIN DESCRIPTION - ONSET: ONSET: ON-GOING

## 2019-07-12 ASSESSMENT — PAIN DESCRIPTION - PROGRESSION: CLINICAL_PROGRESSION: NOT CHANGED

## 2019-07-12 ASSESSMENT — PAIN DESCRIPTION - ORIENTATION: ORIENTATION: RIGHT;LEFT

## 2019-07-12 ASSESSMENT — PAIN DESCRIPTION - PAIN TYPE: TYPE: CHRONIC PAIN

## 2019-07-12 NOTE — PROGRESS NOTES
rectangles & circles out & place them back in the board with increased time using her BUE.     Pt require min vc's for body mechanics & safety to stand. Pt demo Min A sit<>stand with min vc's for technique. Pt ambulated short distance with a rw to a 3:1 commode & min vc's for walker management & safety. Pt educated that she needs to carryover strategies that she has been instructed on. Pt verbalize understanding education provided but continue to require vc's to ensure pt safety when she is up & moving.     -Towel stretches completed on incline to increase B UE ROM with fair- tolerance.   -Lg wooden block puzzle completed seated at table with B UE's to increase B UE AROM, gross grasp/release and coordination. Fair- tolerance. -Ball rolling activity completed seated at tabletop with focus on increasing [address], B integration and B UE AROM. Fair tolerance. Sensory / Neuromuscular Re-Education:        Cognitive Skills:    Status Comments   Problem   Solving fair  During functional tasks   Memory fair + \"   Sequencing fair  \"   Safety fair - \"              Scheduled Meds:   docusate sodium  1 enema Rectal Daily    docusate sodium  100 mg Oral BID    amLODIPine  5 mg Oral Daily    escitalopram  20 mg Oral Daily    fluticasone  1 spray Nasal Daily    therapeutic multivitamin-minerals  1 tablet Oral Daily    vitamin B-12  50 mcg Oral Daily     Continuous Infusions:  PRN Meds:oxyCODONE **OR** oxyCODONE, cyclobenzaprine, tiZANidine, acetaminophen, magnesium hydroxide  I/O last 3 completed shifts: In: 1080 [P.O.:1080]  Out: 1350 [Urine:1350]  No intake/output data recorded. Labs reviewed  CBC: No results for input(s): WBC, HGB, PLT in the last 72 hours. BMP:  No results for input(s): NA, K, CL, CO2, BUN, CREATININE, GLUCOSE in the last 72 hours. Hepatic: No results for input(s): AST, ALT, ALB, BILITOT, ALKPHOS in the last 72 hours. BNP: No results for input(s): BNP in the last 72 hours.   Lipids: No

## 2019-07-12 NOTE — PROGRESS NOTES
Physical Therapy    Facility/Department: [facility]  Daily Treatment Note    NAME: Ania Michel  : 1950  MRN: [id number]    Date of Service: 2019    Supervising Therapist: Pedro Meek PT, DPT    ROOM: [address] [place] [address]  DIAGNOSIS: incomplete spinal cord injury   PMH: To ED on 19 for numbness/tingling. s/p C3-6 PCF/laminectomy on 19 after fall before being discharged to SNF. S/P cervical wound exploration/evacuation of posterior cervical epidural hematoma on 7/3/19. PMH includes anxiety, depression, KRISTAL R LE, HTN    PRECAUTIONS: Cervical collar AAT, c-spine precautions, spinal neutral mechanics, falls     Social: Pt lives alone in a 2 floor plan 3-4 steps and 0 rails to enter. 12 steps upstairs with right HR. Prior to admission pt walked with no device but owns ww. Initial Evaluation  19 AM     PM   Short Term Goals Long Term Goals    Was pt agreeable to Eval/treatment? yes yes yes. Does pt have pain?  4/10 neck pain  Pt c/o moderate neck pain No c/o pain     Bed Mobility  Rolling: min/modA  Supine to sit: modA  Sit to supine: maxA  Scooting: modA NT NT sup Mod I   Transfers Sit to stand: mod/maxA  Stand to sit: mod/maxA  Stand pivot: maxA with ww Sit<>Stand ModA  Stand-pivot ModA without AD Sit<>Stand ModA  Stand-pivot ModA without AD     Jennifer with AAD Sup with AAD   Ambulation    50 feet with ww with Jennifer 10 feet x 2 reps and 15 feet x 4 reps without AD followed by turn to sit in chair with Jennifer/ModA   150 feet x 2 reps, 75 feet x 2 reps an d 50 feet x 2 reps without AD with Jennifer/ModA >150 feet with AAD with sba >250 feet with AAD with sup   Wheel Chair Mobility Nt NT NT  400 feet with available extremities with Modified Independent   Car Transfers NT NT ModA Jennifer Sup    Stair negotiation: ascended and descended NT NT 4 steps with 2 HR x 2 reps with ModA (down BW) 4 steps with one rail with Jennifer >12 steps with one rail with sup   Curb Step:   ascended and descended NT NT NT 4 inch step with AAD and Jennifer 4 inch step with AAD and sup    BLE ROM WFL WNL      BLE Strength Bilateral LE: 4-/5 Bilateral LE: 4-/5      Balance  Sitting: sup  Standing: mod/maxA with ww Static and dynamic standing balance ModA/MaxA with [employer] Static and dynamic standing balance ModA/MaxA with [employer] and without AD     Date Family Teach Completed TBA       Is additional Family Teaching Needed? Y or N Y pending improvement Y Y     Hindering Progress Safety awareness, clonus/flexor withdrawal L LE, coordination Balance/ coordination and sensory deficits Balance/ coordination     PT recommended ELOS 4-5 weeks       Team's Discharge Plan        Therapist at Team Meeting          Therapeutic Exercise:   AM:  Sit<>Stand transfer 3 x 5 reps from edge of mat table with Jennifer  Ambulation with SPC 10 feet x 1 rep followed by turn to sit in chair with ModA  Ambulation with R LBQC 10 feet x 1 rep followed by turn to sit in chair with [address]  PM: NA    Patient education  Pt educated on adequate JAMES and functional implication    Patient response to education:   Pt verbalized understanding Pt demonstrated skill Pt requires further education in this area   yes partial yes     Additional Comments: Attempted various AD. Pt unable to adequately control unilateral device due to extensive UE deficits. Manual assistance provided for adequate anterior lean to stand. Although LE sensory and general balance deficits are present, UE's significantly more effected than LE and pt performs better without UE support as she is not distracted by attempting to maintain grasp/control device. Activity progressed during PM session. Pt exhibits improving dynamic standing balance, still requiring assistance to stand from low surfaces due to retropulsion. Plan to progress activity without device. AM  Time in: 1045  Time out: 1130    PM  Time in: 1515  Time out: 1600    Pt is making good progress toward established Physical Therapy goals.   Continue with physical therapy current plan of care.     Rebecca Poole, PT, DPT  [phone]

## 2019-07-12 NOTE — PROGRESS NOTES
Third Session    [] Individual Tx-   [] Concurrent Tx -  [] Co-Tx -   [] Group Tx -   [] Time Missed -         Total Tx Time 110 Mins   Rosa Seats OTR/L [address] [facility] RAMEY/L 24205    I have read & agree with the above status.     Rosa Seats OTR/L [id number]

## 2019-07-12 NOTE — PATIENT CARE CONFERENCE
mobility:   Current level: max assist  Short term bed mobility goal: supervision  Long term bed mobility goal: Modified independent    Chair/bed transfers:  Current level: mod assist with wheeled walker , transfer board transfers are  min assist  Short term Chair/bed transfers goal: min assist  Long term Chair/bed transfers goal: supervision      Ambulation:   Current level: 200 ft wheeled walker at mod assist-max assist , wheelchair  follow, narrow base of support  Short term ambulation goal: standby assist  Long term ambulation goal: 250 at supervision    Wheelchair Mobility:  Current level: 300 ft at supervision  Short term wheelchair goal: met  Long term wheelchair goal: 400 ft.  at Modified independent      Car transfers:   Current level: to be assessed    Stairs:   Current level : not tested    Lower Extremity Strength Issues:  4-/5 bilateral lower, coordination deficits, uppers are weak, trouble gripping walker    Other comments: has balance, coordination and sensory deficits      OCCUPATIONAL THERAPY:      Tub/shower:   Current level: to be assessed      Feeding:  Current level: min assist  Short term feeding goal: Modified independent  Long term feeding goal: Modified independent    Grooming:   Current level: mod assist  Short term grooming goal: min assist  Long term grooming goal: supervision    Bathing:  Current level: max assist  Short term bathing goal: mod assist  Long term bathing goal: min assist    Homemaking:   Current level: to be assessed    Upper body dressing:  Current level: mod assist  Short term upper body dressing goal: min assist  Long term upper body dressing goal: supervision    Lower body dressing:  Current level: max assist  Short term lower body dressing goal: mod assist  Long term lower body dressing goal: min assist    Toilet transfer:   Current level: mod assist  Short term toilet transfer goal: min assist  Long term toilet transfer goal: supervision      Other comments: poor carryover, fair - safety, loss of balance during therapy sessions, mod assist to recover        SPEECH THERAPY-formal speech/cognitive eval will be done today due to patient reporting swallowing difficulties    Social interaction:  Modified independent    Safety awareness: fair -    Patient/family's personal goals: \"get better\"  Factors supporting goal achievement:  motivated  Factors hindering goal achievement:  balance, level of impairment      Discharge Plan   Estimated Length of Stay: about 4 weeks            Destination: own home vs. home with son  Services at Discharge: to be assessed  Equipment at Discharge: to be assessed      INTERDISCIPLINARY TEAM/PHYSICIAN RECOMMENDATION AND/OR REVISIONS OF PLAN OF CARE:  continue working on balance, safety, speech eval today      I approve the established interdisciplinary plan of care as documented within the medical record of Valverde Idalia. Please see team conference signature page for those in attendance.     Electronically signed by Sapna Westbrook RN  on 7/12/2019 at 8:23 AM

## 2019-07-13 PROCEDURE — 1280000000 HC REHAB R&B

## 2019-07-13 PROCEDURE — 6370000000 HC RX 637 (ALT 250 FOR IP): Performed by: INTERNAL MEDICINE

## 2019-07-13 PROCEDURE — 6370000000 HC RX 637 (ALT 250 FOR IP): Performed by: PHYSICAL MEDICINE & REHABILITATION

## 2019-07-13 PROCEDURE — 97530 THERAPEUTIC ACTIVITIES: CPT

## 2019-07-13 RX ADMIN — VITAM B12 50 MCG: 100 TAB at 09:08

## 2019-07-13 RX ADMIN — CYCLOBENZAPRINE 10 MG: 10 TABLET, FILM COATED ORAL at 20:30

## 2019-07-13 RX ADMIN — AMLODIPINE BESYLATE 5 MG: 5 TABLET ORAL at 09:07

## 2019-07-13 RX ADMIN — MULTIPLE VITAMINS W/ MINERALS TAB 1 TABLET: TAB at 09:07

## 2019-07-13 RX ADMIN — VITAMIN D, TAB 1000IU (100/BT) 1000 UNITS: 25 TAB at 17:21

## 2019-07-13 RX ADMIN — DOCUSATE SODIUM 100 MG: 100 CAPSULE, LIQUID FILLED ORAL at 09:08

## 2019-07-13 RX ADMIN — FLUTICASONE PROPIONATE 1 SPRAY: 50 SPRAY, METERED NASAL at 09:09

## 2019-07-13 RX ADMIN — ESCITALOPRAM OXALATE 20 MG: 10 TABLET, FILM COATED ORAL at 09:07

## 2019-07-13 RX ADMIN — DOCUSATE SODIUM 100 MG: 100 CAPSULE, LIQUID FILLED ORAL at 20:30

## 2019-07-13 RX ADMIN — OXYCODONE HYDROCHLORIDE 10 MG: 10 TABLET ORAL at 20:30

## 2019-07-13 ASSESSMENT — PAIN SCALES - GENERAL
PAINLEVEL_OUTOF10: 0
PAINLEVEL_OUTOF10: 7
PAINLEVEL_OUTOF10: 0

## 2019-07-13 ASSESSMENT — PAIN DESCRIPTION - LOCATION: LOCATION: NECK

## 2019-07-13 ASSESSMENT — PAIN DESCRIPTION - ORIENTATION: ORIENTATION: MID

## 2019-07-13 ASSESSMENT — PAIN - FUNCTIONAL ASSESSMENT: PAIN_FUNCTIONAL_ASSESSMENT: PREVENTS OR INTERFERES SOME ACTIVE ACTIVITIES AND ADLS

## 2019-07-13 ASSESSMENT — PAIN DESCRIPTION - DESCRIPTORS: DESCRIPTORS: ACHING;DULL;SORE

## 2019-07-13 ASSESSMENT — PAIN DESCRIPTION - ONSET: ONSET: ON-GOING

## 2019-07-13 ASSESSMENT — PAIN DESCRIPTION - PAIN TYPE: TYPE: CHRONIC PAIN

## 2019-07-13 ASSESSMENT — PAIN DESCRIPTION - PROGRESSION: CLINICAL_PROGRESSION: NOT CHANGED

## 2019-07-13 ASSESSMENT — PAIN DESCRIPTION - FREQUENCY: FREQUENCY: INTERMITTENT

## 2019-07-13 NOTE — PROGRESS NOTES
Progress Note    Subjective/   77y.o. year old female on the rehab unit for spinal cord injury. She is questioning the need for [place]. No new complaints. Still with the sore throat but as per patient and discussion with SLP this predates her cervical surgery. No SOB or chest pain. Tolerating therapy. Brother at the bedside. Objective/   VITALS:  BP (!) 124/57   Pulse 106   Temp 99 °F (37.2 °C) (Oral)   Resp 18   Ht 5' 4\" (1.626 m)   Wt 187 lb (84.8 kg)   SpO2 94%   BMI 32.10 kg/m²   24HR INTAKE/OUTPUT:      Intake/Output Summary (Last 24 hours) at 7/13/2019 1510  Last data filed at 7/13/2019 1504  Gross per 24 hour   Intake 1260 ml   Output 2750 ml   Net -1490 ml     Constitutional:  Alert, awake, no apparent distress   Cardiovascular:  S1, S2 without m/r/g   Respiratory:  CTA B without w/r/r   Abdomen: +BS  Ext: no pitting LE edema  Neuro: weakness and numbness (B) UEs    Functional Level    Initial Evaluation  7/7/19 AM    Short Term Goals Long Term Goals    Was pt agreeable to Eval/treatment? yes yes       Does pt have pain?  4/10 neck pain  Pt c/o moderate neck pain       Bed Mobility  Rolling: min/modA  Supine to sit: modA  Sit to supine: maxA  Scooting: modA Sit>Supine SBA  Supine>Sit ModA  Scooting SBA  Rolling Jennifer sup Mod I   Transfers Sit to stand: mod/maxA  Stand to sit: mod/maxA  Stand pivot: maxA with ww Sit<>Stand ModA  Stand-pivot Jennifer without AD Jennifer with AAD Sup with AAD   Ambulation    50 feet with ww with Jennifer 175 feet x 1 rep and 200 feet x 1 rep without AD with Jennifer/ModA    >150 feet with AAD with sba >250 feet with AAD with sup   Wheel Chair Mobility Nt NT   400 feet with available extremities with Modified Independent   Car Transfers NT Jennifer to enter, ModA to exit Jennifer Sup    Stair negotiation: ascended and descended NT NT 4 steps with one rail with Jennifer >12 steps with one rail with sup   Curb Step:   ascended and descended NT NT 4 inch step with AAD and Jennifer 4 inch

## 2019-07-14 LAB
ANION GAP SERPL CALCULATED.3IONS-SCNC: 12 MMOL/L (ref 7–16)
ANISOCYTOSIS: ABNORMAL
BACTERIA: ABNORMAL /HPF
BASOPHILS ABSOLUTE: 0.07 E9/L (ref 0–0.2)
BASOPHILS RELATIVE PERCENT: 0.4 % (ref 0–2)
BILIRUBIN URINE: NEGATIVE
BLOOD, URINE: ABNORMAL
BUN BLDV-MCNC: 16 MG/DL (ref 8–23)
CALCIUM SERPL-MCNC: 9 MG/DL (ref 8.6–10.2)
CHLORIDE BLD-SCNC: 96 MMOL/L (ref 98–107)
CLARITY: ABNORMAL
CO2: 28 MMOL/L (ref 22–29)
COLOR: YELLOW
CREAT SERPL-MCNC: 0.8 MG/DL (ref 0.5–1)
EOSINOPHILS ABSOLUTE: 0.05 E9/L (ref 0.05–0.5)
EOSINOPHILS RELATIVE PERCENT: 0.3 % (ref 0–6)
GFR AFRICAN AMERICAN: >60
GFR NON-AFRICAN AMERICAN: >60 ML/MIN/1.73
GLUCOSE BLD-MCNC: 116 MG/DL (ref 74–99)
GLUCOSE URINE: NEGATIVE MG/DL
HCT VFR BLD CALC: 34.3 % (ref 34–48)
HEMOGLOBIN: 10.8 G/DL (ref 11.5–15.5)
IMMATURE GRANULOCYTES #: 0.15 E9/L
IMMATURE GRANULOCYTES %: 0.8 % (ref 0–5)
KETONES, URINE: NEGATIVE MG/DL
LEUKOCYTE ESTERASE, URINE: ABNORMAL
LYMPHOCYTES ABSOLUTE: 2.21 E9/L (ref 1.5–4)
LYMPHOCYTES RELATIVE PERCENT: 12 % (ref 20–42)
MCH RBC QN AUTO: 28.3 PG (ref 26–35)
MCHC RBC AUTO-ENTMCNC: 31.5 % (ref 32–34.5)
MCV RBC AUTO: 90 FL (ref 80–99.9)
MONOCYTES ABSOLUTE: 1.68 E9/L (ref 0.1–0.95)
MONOCYTES RELATIVE PERCENT: 9.1 % (ref 2–12)
NEUTROPHILS ABSOLUTE: 14.31 E9/L (ref 1.8–7.3)
NEUTROPHILS RELATIVE PERCENT: 77.4 % (ref 43–80)
NITRITE, URINE: POSITIVE
PDW BLD-RTO: 14.1 FL (ref 11.5–15)
PH UA: 5 (ref 5–9)
PLATELET # BLD: 353 E9/L (ref 130–450)
PMV BLD AUTO: 9.5 FL (ref 7–12)
POTASSIUM SERPL-SCNC: 4.2 MMOL/L (ref 3.5–5)
PROTEIN UA: 30 MG/DL
RBC # BLD: 3.81 E12/L (ref 3.5–5.5)
RBC UA: ABNORMAL /HPF (ref 0–2)
SODIUM BLD-SCNC: 136 MMOL/L (ref 132–146)
SPECIFIC GRAVITY UA: 1.01 (ref 1–1.03)
UROBILINOGEN, URINE: 0.2 E.U./DL
WBC # BLD: 18.5 E9/L (ref 4.5–11.5)
WBC UA: >20 /HPF (ref 0–5)

## 2019-07-14 PROCEDURE — 81001 URINALYSIS AUTO W/SCOPE: CPT

## 2019-07-14 PROCEDURE — 87077 CULTURE AEROBIC IDENTIFY: CPT

## 2019-07-14 PROCEDURE — 6370000000 HC RX 637 (ALT 250 FOR IP): Performed by: PHYSICAL MEDICINE & REHABILITATION

## 2019-07-14 PROCEDURE — 1280000000 HC REHAB R&B

## 2019-07-14 PROCEDURE — 6370000000 HC RX 637 (ALT 250 FOR IP): Performed by: INTERNAL MEDICINE

## 2019-07-14 PROCEDURE — 87186 SC STD MICRODIL/AGAR DIL: CPT

## 2019-07-14 PROCEDURE — 85025 COMPLETE CBC W/AUTO DIFF WBC: CPT

## 2019-07-14 PROCEDURE — 36415 COLL VENOUS BLD VENIPUNCTURE: CPT

## 2019-07-14 PROCEDURE — 97110 THERAPEUTIC EXERCISES: CPT

## 2019-07-14 PROCEDURE — 97530 THERAPEUTIC ACTIVITIES: CPT

## 2019-07-14 PROCEDURE — 80048 BASIC METABOLIC PNL TOTAL CA: CPT

## 2019-07-14 PROCEDURE — 87088 URINE BACTERIA CULTURE: CPT

## 2019-07-14 RX ORDER — LEVOFLOXACIN 500 MG/1
500 TABLET, FILM COATED ORAL DAILY
Status: DISCONTINUED | OUTPATIENT
Start: 2019-07-14 | End: 2019-07-26

## 2019-07-14 RX ADMIN — CYCLOBENZAPRINE 10 MG: 10 TABLET, FILM COATED ORAL at 16:55

## 2019-07-14 RX ADMIN — OXYCODONE HYDROCHLORIDE 10 MG: 10 TABLET ORAL at 16:54

## 2019-07-14 RX ADMIN — VITAM B12 50 MCG: 100 TAB at 07:59

## 2019-07-14 RX ADMIN — MULTIPLE VITAMINS W/ MINERALS TAB 1 TABLET: TAB at 07:58

## 2019-07-14 RX ADMIN — FLUTICASONE PROPIONATE 1 SPRAY: 50 SPRAY, METERED NASAL at 07:59

## 2019-07-14 RX ADMIN — ESCITALOPRAM OXALATE 20 MG: 10 TABLET, FILM COATED ORAL at 07:58

## 2019-07-14 RX ADMIN — AMLODIPINE BESYLATE 5 MG: 5 TABLET ORAL at 07:58

## 2019-07-14 RX ADMIN — DOCUSATE SODIUM 100 MG: 100 CAPSULE, LIQUID FILLED ORAL at 07:58

## 2019-07-14 RX ADMIN — DOCUSATE SODIUM 100 MG: 100 CAPSULE, LIQUID FILLED ORAL at 21:07

## 2019-07-14 RX ADMIN — LEVOFLOXACIN 500 MG: 500 TABLET, FILM COATED ORAL at 14:15

## 2019-07-14 RX ADMIN — VITAMIN D, TAB 1000IU (100/BT) 1000 UNITS: 25 TAB at 07:58

## 2019-07-14 ASSESSMENT — PAIN DESCRIPTION - FREQUENCY: FREQUENCY: INTERMITTENT

## 2019-07-14 ASSESSMENT — PAIN DESCRIPTION - ORIENTATION: ORIENTATION: MID

## 2019-07-14 ASSESSMENT — PAIN DESCRIPTION - PAIN TYPE: TYPE: ACUTE PAIN

## 2019-07-14 ASSESSMENT — PAIN DESCRIPTION - LOCATION: LOCATION: BACK

## 2019-07-14 ASSESSMENT — PAIN SCALES - GENERAL
PAINLEVEL_OUTOF10: 7
PAINLEVEL_OUTOF10: 0
PAINLEVEL_OUTOF10: 4
PAINLEVEL_OUTOF10: 2

## 2019-07-14 ASSESSMENT — PAIN DESCRIPTION - ONSET: ONSET: ON-GOING

## 2019-07-14 ASSESSMENT — PAIN DESCRIPTION - PROGRESSION: CLINICAL_PROGRESSION: NOT CHANGED

## 2019-07-14 ASSESSMENT — PAIN DESCRIPTION - DESCRIPTORS: DESCRIPTORS: ACHING;DULL

## 2019-07-15 PROCEDURE — 97530 THERAPEUTIC ACTIVITIES: CPT

## 2019-07-15 PROCEDURE — 6370000000 HC RX 637 (ALT 250 FOR IP): Performed by: PHYSICAL MEDICINE & REHABILITATION

## 2019-07-15 PROCEDURE — 97535 SELF CARE MNGMENT TRAINING: CPT

## 2019-07-15 PROCEDURE — 51701 INSERT BLADDER CATHETER: CPT

## 2019-07-15 PROCEDURE — 1280000000 HC REHAB R&B

## 2019-07-15 PROCEDURE — 6370000000 HC RX 637 (ALT 250 FOR IP): Performed by: INTERNAL MEDICINE

## 2019-07-15 PROCEDURE — 51798 US URINE CAPACITY MEASURE: CPT

## 2019-07-15 RX ORDER — POLYVINYL ALCOHOL 14 MG/ML
1 SOLUTION/ DROPS OPHTHALMIC PRN
Status: DISCONTINUED | OUTPATIENT
Start: 2019-07-15 | End: 2019-07-21 | Stop reason: SDUPTHER

## 2019-07-15 RX ADMIN — DOCUSATE SODIUM 100 MG: 100 CAPSULE, LIQUID FILLED ORAL at 08:57

## 2019-07-15 RX ADMIN — VITAMIN D, TAB 1000IU (100/BT) 1000 UNITS: 25 TAB at 08:57

## 2019-07-15 RX ADMIN — VITAM B12 50 MCG: 100 TAB at 08:57

## 2019-07-15 RX ADMIN — ESCITALOPRAM OXALATE 20 MG: 10 TABLET, FILM COATED ORAL at 08:57

## 2019-07-15 RX ADMIN — MULTIPLE VITAMINS W/ MINERALS TAB 1 TABLET: TAB at 08:57

## 2019-07-15 RX ADMIN — LEVOFLOXACIN 500 MG: 500 TABLET, FILM COATED ORAL at 11:59

## 2019-07-15 RX ADMIN — FLUTICASONE PROPIONATE 1 SPRAY: 50 SPRAY, METERED NASAL at 08:59

## 2019-07-15 RX ADMIN — AMLODIPINE BESYLATE 5 MG: 5 TABLET ORAL at 08:57

## 2019-07-15 RX ADMIN — OXYCODONE HYDROCHLORIDE 10 MG: 10 TABLET ORAL at 08:57

## 2019-07-15 RX ADMIN — DOCUSATE SODIUM 100 MG: 100 CAPSULE, LIQUID FILLED ORAL at 21:05

## 2019-07-15 ASSESSMENT — PAIN - FUNCTIONAL ASSESSMENT: PAIN_FUNCTIONAL_ASSESSMENT: PREVENTS OR INTERFERES SOME ACTIVE ACTIVITIES AND ADLS

## 2019-07-15 ASSESSMENT — PAIN DESCRIPTION - PAIN TYPE: TYPE: ACUTE PAIN

## 2019-07-15 ASSESSMENT — PAIN DESCRIPTION - PROGRESSION: CLINICAL_PROGRESSION: NOT CHANGED

## 2019-07-15 ASSESSMENT — PAIN SCALES - GENERAL
PAINLEVEL_OUTOF10: 0
PAINLEVEL_OUTOF10: 7
PAINLEVEL_OUTOF10: 0

## 2019-07-15 ASSESSMENT — PAIN DESCRIPTION - DESCRIPTORS: DESCRIPTORS: ACHING;DULL

## 2019-07-15 ASSESSMENT — PAIN DESCRIPTION - ORIENTATION: ORIENTATION: MID

## 2019-07-15 ASSESSMENT — PAIN DESCRIPTION - FREQUENCY: FREQUENCY: INTERMITTENT

## 2019-07-15 ASSESSMENT — PAIN DESCRIPTION - ONSET: ONSET: ON-GOING

## 2019-07-15 ASSESSMENT — PAIN DESCRIPTION - LOCATION: LOCATION: BACK

## 2019-07-16 LAB
ANION GAP SERPL CALCULATED.3IONS-SCNC: 12 MMOL/L (ref 7–16)
BASOPHILS ABSOLUTE: 0.05 E9/L (ref 0–0.2)
BASOPHILS RELATIVE PERCENT: 0.6 % (ref 0–2)
BUN BLDV-MCNC: 24 MG/DL (ref 8–23)
CALCIUM SERPL-MCNC: 10 MG/DL (ref 8.6–10.2)
CHLORIDE BLD-SCNC: 97 MMOL/L (ref 98–107)
CO2: 28 MMOL/L (ref 22–29)
CREAT SERPL-MCNC: 0.7 MG/DL (ref 0.5–1)
EOSINOPHILS ABSOLUTE: 0.3 E9/L (ref 0.05–0.5)
EOSINOPHILS RELATIVE PERCENT: 3.3 % (ref 0–6)
GFR AFRICAN AMERICAN: >60
GFR NON-AFRICAN AMERICAN: >60 ML/MIN/1.73
GLUCOSE BLD-MCNC: 198 MG/DL (ref 74–99)
HCT VFR BLD CALC: 34.6 % (ref 34–48)
HEMOGLOBIN: 10.8 G/DL (ref 11.5–15.5)
IMMATURE GRANULOCYTES #: 0.09 E9/L
IMMATURE GRANULOCYTES %: 1 % (ref 0–5)
LYMPHOCYTES ABSOLUTE: 1.34 E9/L (ref 1.5–4)
LYMPHOCYTES RELATIVE PERCENT: 14.8 % (ref 20–42)
MCH RBC QN AUTO: 28.1 PG (ref 26–35)
MCHC RBC AUTO-ENTMCNC: 31.2 % (ref 32–34.5)
MCV RBC AUTO: 90.1 FL (ref 80–99.9)
MONOCYTES ABSOLUTE: 0.7 E9/L (ref 0.1–0.95)
MONOCYTES RELATIVE PERCENT: 7.8 % (ref 2–12)
NEUTROPHILS ABSOLUTE: 6.55 E9/L (ref 1.8–7.3)
NEUTROPHILS RELATIVE PERCENT: 72.5 % (ref 43–80)
PDW BLD-RTO: 14.1 FL (ref 11.5–15)
PLATELET # BLD: 321 E9/L (ref 130–450)
PMV BLD AUTO: 9.4 FL (ref 7–12)
POTASSIUM SERPL-SCNC: 4.1 MMOL/L (ref 3.5–5)
RBC # BLD: 3.84 E12/L (ref 3.5–5.5)
SODIUM BLD-SCNC: 137 MMOL/L (ref 132–146)
WBC # BLD: 9 E9/L (ref 4.5–11.5)

## 2019-07-16 PROCEDURE — 6370000000 HC RX 637 (ALT 250 FOR IP): Performed by: INTERNAL MEDICINE

## 2019-07-16 PROCEDURE — 85025 COMPLETE CBC W/AUTO DIFF WBC: CPT

## 2019-07-16 PROCEDURE — 6370000000 HC RX 637 (ALT 250 FOR IP): Performed by: PHYSICAL MEDICINE & REHABILITATION

## 2019-07-16 PROCEDURE — 51701 INSERT BLADDER CATHETER: CPT

## 2019-07-16 PROCEDURE — 36415 COLL VENOUS BLD VENIPUNCTURE: CPT

## 2019-07-16 PROCEDURE — 80048 BASIC METABOLIC PNL TOTAL CA: CPT

## 2019-07-16 PROCEDURE — 1280000000 HC REHAB R&B

## 2019-07-16 PROCEDURE — 97535 SELF CARE MNGMENT TRAINING: CPT | Performed by: OCCUPATIONAL THERAPY ASSISTANT

## 2019-07-16 PROCEDURE — 97530 THERAPEUTIC ACTIVITIES: CPT

## 2019-07-16 PROCEDURE — 97110 THERAPEUTIC EXERCISES: CPT | Performed by: OCCUPATIONAL THERAPY ASSISTANT

## 2019-07-16 PROCEDURE — 97110 THERAPEUTIC EXERCISES: CPT

## 2019-07-16 PROCEDURE — 97530 THERAPEUTIC ACTIVITIES: CPT | Performed by: OCCUPATIONAL THERAPY ASSISTANT

## 2019-07-16 PROCEDURE — 51798 US URINE CAPACITY MEASURE: CPT

## 2019-07-16 RX ORDER — TAMSULOSIN HYDROCHLORIDE 0.4 MG/1
0.4 CAPSULE ORAL DAILY
Status: DISCONTINUED | OUTPATIENT
Start: 2019-07-16 | End: 2019-08-03

## 2019-07-16 RX ADMIN — FLUTICASONE PROPIONATE 1 SPRAY: 50 SPRAY, METERED NASAL at 08:13

## 2019-07-16 RX ADMIN — DOCUSATE SODIUM 100 MG: 100 CAPSULE, LIQUID FILLED ORAL at 08:13

## 2019-07-16 RX ADMIN — LEVOFLOXACIN 500 MG: 500 TABLET, FILM COATED ORAL at 11:42

## 2019-07-16 RX ADMIN — MULTIPLE VITAMINS W/ MINERALS TAB 1 TABLET: TAB at 08:13

## 2019-07-16 RX ADMIN — OXYCODONE HYDROCHLORIDE 10 MG: 10 TABLET ORAL at 21:54

## 2019-07-16 RX ADMIN — VITAM B12 50 MCG: 100 TAB at 08:13

## 2019-07-16 RX ADMIN — DOCUSATE SODIUM 100 MG: 100 CAPSULE, LIQUID FILLED ORAL at 21:54

## 2019-07-16 RX ADMIN — ACETAMINOPHEN 650 MG: 325 TABLET, FILM COATED ORAL at 08:13

## 2019-07-16 RX ADMIN — VITAMIN D, TAB 1000IU (100/BT) 1000 UNITS: 25 TAB at 08:13

## 2019-07-16 RX ADMIN — TAMSULOSIN HYDROCHLORIDE 0.4 MG: 0.4 CAPSULE ORAL at 16:56

## 2019-07-16 RX ADMIN — ESCITALOPRAM OXALATE 20 MG: 10 TABLET, FILM COATED ORAL at 08:13

## 2019-07-16 ASSESSMENT — PAIN DESCRIPTION - ORIENTATION: ORIENTATION: POSTERIOR;RIGHT;LEFT

## 2019-07-16 ASSESSMENT — PAIN SCALES - GENERAL
PAINLEVEL_OUTOF10: 7
PAINLEVEL_OUTOF10: 0
PAINLEVEL_OUTOF10: 3
PAINLEVEL_OUTOF10: 0
PAINLEVEL_OUTOF10: 0

## 2019-07-16 ASSESSMENT — PAIN DESCRIPTION - DESCRIPTORS
DESCRIPTORS: ACHING;DISCOMFORT;TIGHTNESS
DESCRIPTORS: ACHING;THROBBING

## 2019-07-16 ASSESSMENT — PAIN DESCRIPTION - FREQUENCY: FREQUENCY: INTERMITTENT

## 2019-07-16 ASSESSMENT — PAIN DESCRIPTION - ONSET: ONSET: ON-GOING

## 2019-07-16 ASSESSMENT — PAIN DESCRIPTION - LOCATION
LOCATION: BACK;NECK;SHOULDER
LOCATION: NECK;SHOULDER

## 2019-07-16 ASSESSMENT — PAIN DESCRIPTION - PROGRESSION: CLINICAL_PROGRESSION: NOT CHANGED

## 2019-07-16 ASSESSMENT — PAIN DESCRIPTION - PAIN TYPE: TYPE: ACUTE PAIN

## 2019-07-16 NOTE — PROGRESS NOTES
07/16/19 1520   Attendance   Activity Exercise  (Volleyball)   Participation Active participation   [place] Demonstrates ability to complete social goals   Social Skills Cooperates with others in group activity   Leisure Education Demonstrates knowledge of benefits of leisure involvement  (Identified stretching as a benefit.)   Time Spent With Patient   Minutes 30

## 2019-07-16 NOTE — PROGRESS NOTES
Occupational Therapy  OCCUPATIONAL THERAPY DAILY NOTE    Date:2019  Patient Name: Andry Jernigan  MRN: [id number]  : 1950  Room: [address] [place] [address]     DIAGNOSIS: ISCI  Pertinent History: To ED on 19 for numbness/tingling. s/p C3-6 PCF/laminectomy on 19 after fall before being discharged to SNF. S/P cervical wound exploration/evacuation of posterior cervical epidural hematoma on 7/3/19. PMH includes anxiety, depression, KRISTAL R LE, HTN  Precautions: Cervical collar AAT, c-spine precautions, spinal neutral mechanics, falls (had an assist to ground 7/15 with RN)   Social History: Pt lives alone in a 2 floor plan 3-4 steps and 0 rails to enter. 12 steps upstairs with right HR. Prior to admission pt walked with no device but owns ww. Functional Assessment:   Date Status AE  Comments   Feeding 19 Minimal Assist    upon arrival to , but had spilled some food on her shirt   Grooming 19 Moderate Assist      Bathing 19 Maximal Assist      UB Dressing 19 Mod A     LB Dressing 19 Maximal Assist  Reacher & sock aid, LH [employer]horn    Homemaking 19  Max A       Functional Transfers / Balance:   Date Status DME  Comments   Sit Balance 19 CGA     Stand Balance 19 Min A rw    [x] Tub  [] Shower   Transfer 19 Mod A Rw, extended tub bench    Commode   Transfer 7/15/19 Max A (progressed to Mod A) rw    Functional   Mobility 19 Min A rw    Other: sit to stand  Bed>w/c 19 Mod assist    Min A      Mod A for bed mobility and Min A for SPT. Functional Exercises / Activity:  Pt seen this AM in OT gym. Participated in volleyball activity, seated, to increase leisure and BUE ROM/strength against gravity for ease with ADLs/functinal transfers. Required rest breaks during session. Pt appeared to have tolerated session well. Tabletop ax with 2# wt'd box to increase AROM and strength of BUE's. 25 reps x3 planes.    Dressing board to tie bows and increase [facility] and

## 2019-07-16 NOTE — FLOWSHEET NOTE
07/16/19 0600   Intake   P.O. 60 mL   Output (mL)   Urine 0 mL  (No urge to void. )   Urine Assessment   Incontinence No   Urine Color Yellow/straw   Urine Appearance Clear   Urine Odor Malodorous   Bladder Scan Volume (mL) 999 mL  (>999ml)   $ Bladder scan $ Yes   Intermittent/Straight Cath (mL) 1500 mL   $ Cath urethra straight $ Yes   Stool Assessment   Incontinence No   Last BM (including prior to admit) 07/13/19   Unmeasured Output   Urine Occurrence 0   Stool Occurrence 0   Patient could not tell her bladder was full and denied any discomfort. RN notified.

## 2019-07-17 LAB
ORGANISM: ABNORMAL
ORGANISM: ABNORMAL
URINE CULTURE, ROUTINE: ABNORMAL

## 2019-07-17 PROCEDURE — 1280000000 HC REHAB R&B

## 2019-07-17 PROCEDURE — 51701 INSERT BLADDER CATHETER: CPT

## 2019-07-17 PROCEDURE — 97110 THERAPEUTIC EXERCISES: CPT | Performed by: OCCUPATIONAL THERAPY ASSISTANT

## 2019-07-17 PROCEDURE — 97110 THERAPEUTIC EXERCISES: CPT

## 2019-07-17 PROCEDURE — 51798 US URINE CAPACITY MEASURE: CPT

## 2019-07-17 PROCEDURE — 97530 THERAPEUTIC ACTIVITIES: CPT

## 2019-07-17 PROCEDURE — 97530 THERAPEUTIC ACTIVITIES: CPT | Performed by: OCCUPATIONAL THERAPY ASSISTANT

## 2019-07-17 PROCEDURE — 97535 SELF CARE MNGMENT TRAINING: CPT

## 2019-07-17 PROCEDURE — 6370000000 HC RX 637 (ALT 250 FOR IP): Performed by: INTERNAL MEDICINE

## 2019-07-17 PROCEDURE — 97535 SELF CARE MNGMENT TRAINING: CPT | Performed by: OCCUPATIONAL THERAPY ASSISTANT

## 2019-07-17 PROCEDURE — 6370000000 HC RX 637 (ALT 250 FOR IP): Performed by: PHYSICAL MEDICINE & REHABILITATION

## 2019-07-17 RX ADMIN — TAMSULOSIN HYDROCHLORIDE 0.4 MG: 0.4 CAPSULE ORAL at 10:30

## 2019-07-17 RX ADMIN — FLUTICASONE PROPIONATE 1 SPRAY: 50 SPRAY, METERED NASAL at 10:31

## 2019-07-17 RX ADMIN — MULTIPLE VITAMINS W/ MINERALS TAB 1 TABLET: TAB at 10:29

## 2019-07-17 RX ADMIN — AMLODIPINE BESYLATE 5 MG: 5 TABLET ORAL at 10:28

## 2019-07-17 RX ADMIN — LEVOFLOXACIN 500 MG: 500 TABLET, FILM COATED ORAL at 10:31

## 2019-07-17 RX ADMIN — DOCUSATE SODIUM 100 MG: 100 CAPSULE, LIQUID FILLED ORAL at 21:20

## 2019-07-17 RX ADMIN — CYCLOBENZAPRINE 10 MG: 10 TABLET, FILM COATED ORAL at 21:20

## 2019-07-17 RX ADMIN — VITAMIN D, TAB 1000IU (100/BT) 1000 UNITS: 25 TAB at 10:29

## 2019-07-17 RX ADMIN — VITAM B12 50 MCG: 100 TAB at 10:30

## 2019-07-17 RX ADMIN — ESCITALOPRAM OXALATE 20 MG: 10 TABLET, FILM COATED ORAL at 10:29

## 2019-07-17 RX ADMIN — DOCUSATE SODIUM 100 MG: 100 CAPSULE, LIQUID FILLED ORAL at 10:31

## 2019-07-17 ASSESSMENT — PAIN SCALES - GENERAL
PAINLEVEL_OUTOF10: 0

## 2019-07-17 ASSESSMENT — PAIN DESCRIPTION - PROGRESSION
CLINICAL_PROGRESSION: NOT CHANGED
CLINICAL_PROGRESSION: NOT CHANGED

## 2019-07-17 ASSESSMENT — PAIN DESCRIPTION - PAIN TYPE: TYPE: ACUTE PAIN

## 2019-07-17 ASSESSMENT — PAIN DESCRIPTION - LOCATION: LOCATION: BACK;NECK

## 2019-07-17 ASSESSMENT — PAIN DESCRIPTION - DIRECTION: RADIATING_TOWARDS: DOWN BACK

## 2019-07-17 ASSESSMENT — PAIN DESCRIPTION - DESCRIPTORS: DESCRIPTORS: ACHING;THROBBING

## 2019-07-17 ASSESSMENT — PAIN DESCRIPTION - ONSET: ONSET: ON-GOING

## 2019-07-17 ASSESSMENT — PAIN DESCRIPTION - FREQUENCY: FREQUENCY: INTERMITTENT

## 2019-07-17 ASSESSMENT — PAIN - FUNCTIONAL ASSESSMENT: PAIN_FUNCTIONAL_ASSESSMENT: PREVENTS OR INTERFERES SOME ACTIVE ACTIVITIES AND ADLS

## 2019-07-17 ASSESSMENT — PAIN DESCRIPTION - ORIENTATION: ORIENTATION: MID

## 2019-07-18 PROCEDURE — 97110 THERAPEUTIC EXERCISES: CPT

## 2019-07-18 PROCEDURE — 51701 INSERT BLADDER CATHETER: CPT

## 2019-07-18 PROCEDURE — 97530 THERAPEUTIC ACTIVITIES: CPT

## 2019-07-18 PROCEDURE — 51798 US URINE CAPACITY MEASURE: CPT

## 2019-07-18 PROCEDURE — 97535 SELF CARE MNGMENT TRAINING: CPT

## 2019-07-18 PROCEDURE — 6370000000 HC RX 637 (ALT 250 FOR IP): Performed by: PHYSICAL MEDICINE & REHABILITATION

## 2019-07-18 PROCEDURE — 6370000000 HC RX 637 (ALT 250 FOR IP): Performed by: INTERNAL MEDICINE

## 2019-07-18 PROCEDURE — 1280000000 HC REHAB R&B

## 2019-07-18 RX ADMIN — LEVOFLOXACIN 500 MG: 500 TABLET, FILM COATED ORAL at 09:19

## 2019-07-18 RX ADMIN — DOCUSATE SODIUM 100 MG: 100 CAPSULE, LIQUID FILLED ORAL at 22:33

## 2019-07-18 RX ADMIN — AMLODIPINE BESYLATE 5 MG: 5 TABLET ORAL at 09:19

## 2019-07-18 RX ADMIN — FLUTICASONE PROPIONATE 1 SPRAY: 50 SPRAY, METERED NASAL at 09:17

## 2019-07-18 RX ADMIN — OXYCODONE HYDROCHLORIDE 5 MG: 5 TABLET ORAL at 22:33

## 2019-07-18 RX ADMIN — VITAM B12 50 MCG: 100 TAB at 09:16

## 2019-07-18 RX ADMIN — MULTIPLE VITAMINS W/ MINERALS TAB 1 TABLET: TAB at 09:16

## 2019-07-18 RX ADMIN — VITAMIN D, TAB 1000IU (100/BT) 1000 UNITS: 25 TAB at 09:18

## 2019-07-18 RX ADMIN — DOCUSATE SODIUM 100 MG: 100 CAPSULE, LIQUID FILLED ORAL at 09:21

## 2019-07-18 RX ADMIN — TAMSULOSIN HYDROCHLORIDE 0.4 MG: 0.4 CAPSULE ORAL at 09:14

## 2019-07-18 RX ADMIN — CYCLOBENZAPRINE 10 MG: 10 TABLET, FILM COATED ORAL at 09:20

## 2019-07-18 RX ADMIN — ESCITALOPRAM OXALATE 20 MG: 10 TABLET, FILM COATED ORAL at 09:18

## 2019-07-18 ASSESSMENT — PAIN SCALES - GENERAL
PAINLEVEL_OUTOF10: 0
PAINLEVEL_OUTOF10: 4

## 2019-07-18 ASSESSMENT — PAIN DESCRIPTION - LOCATION: LOCATION: NECK;SHOULDER

## 2019-07-18 ASSESSMENT — PAIN DESCRIPTION - DESCRIPTORS: DESCRIPTORS: ACHING;THROBBING

## 2019-07-18 ASSESSMENT — PAIN DESCRIPTION - ORIENTATION: ORIENTATION: RIGHT;LEFT

## 2019-07-18 NOTE — PROGRESS NOTES
Wheel Chair Mobility Nt 300 feet propelling with BLE with supervision 300 feet propelling with BLE with supervision   400 feet with available extremities with Modified Independent   Car Transfers NT NT Jennifer  Jennifer Sup    Stair negotiation: ascended and descended NT NT 12 steps with 2 HR with CGA/Jennifer (descend BW)  4 steps with one rail with Jennifer >12 steps with one rail with sup   Curb Step:   ascended and descended NT NT NT  4 inch step with AAD and Jennifer 4 inch step with AAD and sup    BLE ROM WFL WNL WNL      BLE Strength Bilateral LE: 4-/5 Bilateral LE: 4-/5 Bilateral LE: 4-/5      Balance  Sitting: sup  Standing: mod/maxA with ww Static and dynamic standing balance ModA/Maxa with [employer] Static and dynamic standing balance CGA/Jennifer without device      Date Family Teach Completed TBA        Is additional Family Teaching Needed? Y or N Y pending improvement Y Y      Hindering Progress Safety awareness, clonus/flexor withdrawal L LE, coordination Balance/ coordination and sensory deficits Balance/ coordination and sensory deficits      PT recommended ELOS 4-5 weeks 4 weeks 3 weeks      Team's Discharge Plan  4 weeks 3 weeks      Therapist at [address] [facility], PT, DPT [id number]   Rebecca Poole, PT,DPT        Date:  7/11/19  Supporting factors:  Pt motivated and has good strength  Barriers to discharge:  Sensory deficits, decreased coordination, easily distracted  Additional comments:  Pt has poor  of L hand on ww even with  on ww built up. Pt amb without A.D. Mod/MaxA  DME:  TBD  After Care:  HHPT with 24 hour assistance    Sherly Wiggins [id number]    Date:  7/18/19  Supporting factors:  Pt exhibiting significantly improving physical function  Barriers to discharge:  Poor safety awareness, poor righting reactions, easily distractible, not proactive in care  Additional comments:  Pt unable to safely control WW/device due to UE deficits, pt distracted by attempts to use device leading to LOB.  Pt has impaired attention and requires frequent cueing for safe technique.    DME:  TBD  After Care:  HHPT with 24/7 supervision    Mali Bangura, PT, DPT  [id number]

## 2019-07-18 NOTE — PROGRESS NOTES
for fall prevention. Pt educated with UB/LB dressing tasks to increase ADL's. .       [] Family teach completed on:    Pain Level: neck and BUE shld pain with movement no number given. Additional Notes:   7/15/19: Pt reporting she had an assisted fall to the ground in the AM    Patient has made fair progress during treatment sessions toward set goals. Therapy emphasis to obtain goals:ADL retraining, transfer training, home management, endurance/balance retraining, therex, pt/family education      [x] Continue with current OT Plan of care. [] Prepare for Discharge     DISCHARGE RECOMMENDATIONS  Recommended DME:  Rw, 3:1, tub bench, walker tray    Post Discharge Care:   []Home Independently  []Home with 24hr Care / Supervision []Home with Partial Supervision []Home with Home Health OT []Home with Out Pt OT []Other: ___   Comments:         Time in Time out Tx Time Breakdown  Variance:   First Session  8:10am 9:10am [x] Individual Tx-60mins  [] Concurrent Tx -  Mins  [] Co-Tx -   [] Group Tx -   [] Time Missed -     Second Session 9:30am 10:00am [x] Individual Tx- 30mins   [] Concurrent Tx -  min   [] Co-Tx -   [] Group Tx -   [] Time Missed -      Third Session    [] Individual Tx-   [] Concurrent Tx -  [] Co-Tx -   [] Group Tx -   [] Time Missed -         Total Tx Time: 90 Mins   [facility]  I have read the above note and agree with the documentation.   Arnaldo Young OTR/L [id number]

## 2019-07-18 NOTE — FLOWSHEET NOTE
07/18/19 0300   Intake   P.O. 240 mL   Output (mL)   Urine 100 mL   Urine Assessment   Incontinence No   Urine Color Yellow/straw   Urine Appearance Clear   Urine Odor No odor   Bladder Scan Volume (mL) 999 mL  (PVR >999ml)   $ Bladder scan $ Yes   Intermittent/Straight Cath (mL) 1300 mL   $ Cath urethra straight $ Yes   Stool Assessment   Incontinence No   Stool Appearance Soft   Stool Color Brown   Stool Amount Smear   Stool Source Rectum   Last BM (including prior to admit) 07/17/19   Unmeasured Output   Urine Occurrence 1   Stool Occurrence 0

## 2019-07-19 PROCEDURE — 6370000000 HC RX 637 (ALT 250 FOR IP): Performed by: PHYSICAL MEDICINE & REHABILITATION

## 2019-07-19 PROCEDURE — 1280000000 HC REHAB R&B

## 2019-07-19 PROCEDURE — 51701 INSERT BLADDER CATHETER: CPT

## 2019-07-19 PROCEDURE — 51798 US URINE CAPACITY MEASURE: CPT

## 2019-07-19 PROCEDURE — 97530 THERAPEUTIC ACTIVITIES: CPT

## 2019-07-19 PROCEDURE — 6370000000 HC RX 637 (ALT 250 FOR IP): Performed by: INTERNAL MEDICINE

## 2019-07-19 PROCEDURE — 97110 THERAPEUTIC EXERCISES: CPT

## 2019-07-19 RX ADMIN — VITAMIN D, TAB 1000IU (100/BT) 1000 UNITS: 25 TAB at 08:14

## 2019-07-19 RX ADMIN — CYCLOBENZAPRINE 10 MG: 10 TABLET, FILM COATED ORAL at 22:14

## 2019-07-19 RX ADMIN — LEVOFLOXACIN 500 MG: 500 TABLET, FILM COATED ORAL at 13:28

## 2019-07-19 RX ADMIN — DOCUSATE SODIUM 100 MG: 100 CAPSULE, LIQUID FILLED ORAL at 08:14

## 2019-07-19 RX ADMIN — AMLODIPINE BESYLATE 5 MG: 5 TABLET ORAL at 08:14

## 2019-07-19 RX ADMIN — VITAM B12 50 MCG: 100 TAB at 08:13

## 2019-07-19 RX ADMIN — TAMSULOSIN HYDROCHLORIDE 0.4 MG: 0.4 CAPSULE ORAL at 08:14

## 2019-07-19 RX ADMIN — MULTIPLE VITAMINS W/ MINERALS TAB 1 TABLET: TAB at 08:14

## 2019-07-19 RX ADMIN — ESCITALOPRAM OXALATE 20 MG: 10 TABLET, FILM COATED ORAL at 08:14

## 2019-07-19 RX ADMIN — DOCUSATE SODIUM 100 MG: 100 CAPSULE, LIQUID FILLED ORAL at 22:13

## 2019-07-19 ASSESSMENT — PAIN SCALES - GENERAL
PAINLEVEL_OUTOF10: 0

## 2019-07-19 NOTE — PATIENT CARE CONFERENCE
[address] [place], [place] [address] [place]  INPATIENT ACUTE REHABILITATION  TEAM CONFERENCE NOTE/PATIENT PLAN OF CARE    Date: 2019  Admission date: 2019  Patient Name: Mike Rosen        MRN: [id number]    : 1950  (67 y.o.)  Gender: female   Rehab diagnosis/surgery with date:  Traumatic spinal cord injury-C3-C6 laminectomy and fusion 19, evacuation of posterior cervical epidural hematoma post fall on 7/3/19  Impairment Group Code:  4.230    MEDICAL/FUNCTIONAL HISTORY/STATUS:  To wear soft cervical collar at all times,neurogenic bladder now on cath program to keep lower than 500 ml changed to q6h straight, responding to antibiotics for UTI    Consultations/Labs/X-rays: 19 CBC and BMP on chart, Urinalysis positive and culture positive for pseudomonas>100,000, Ecoli >75,000.  Treated with Levaquin    MEDICATION UPDATE:  Started on Flomax and Levaquin    NURSING FIMS:    Bowel:   Current level: Modified Independent   Short term bowel goal:  Modified Independent   Long term bowel goal: Modified Independent     Bladder:   Current level: Dependent cathed for high residuals  Short term bladder goal: Modified Independent   Long term bladder goal: Modified Independent     Toilet Hygiene:   Current level : Dependent   Short term Toilet hygiene goal: Supervision   Long term toilet hygiene goal:  Supervision     Skin integrity: incision Posterior neck reddened, Left posterior head reddened, buttocks blanchable reddness  Pain: shoulder and neck ranges 7-0, on oxy IR    NUTRITION    Diet  General diet, taking an average of 50-75% of meals  Liquid consistency   thin    SOCIAL INFORMATION:  Lives with: alone  Prior community services:  none  Home Architecture:  2 story, 3 entry, no rails, 13 up to 2nd floor with 1 rail, may go to son's house at discharge  Prior Level of function:  independent  DME:  wheeled walker    FAMILY / PATIENT EDUCATION:  Patient education is ongoing for safety, self care and technique, family education to be scheduled. PHYSICAL THERAPY    Bed mobility:   Current level: Moderate Assist   Short term bed mobility goal: Stand by Assist   Long term bed mobility goal: Supervision     Chair/bed transfers:  Current level: Moderate Assist   Short term Chair/bed transfers goal: Minimum assistance   Long term Chair/bed transfers goal: Supervision       Ambulation:   Current level: 200' no device at Minimum assistance   Short term ambulation goal: > 150' Appropriate assistive device Stand by Assist   Long term ambulation goal: > 250' Appropriate assistive device Supervision     Car transfers:   Current level: Minimum assistance   Short term car transfers goal: Minimum assistance   Long term car transfers goal:Supervision     Stairs:   Current level : 12 steps with 2 rails  Minimum assistance   Short term stairs goal: exceeded  Long term stairs goal: >12 steps with 1 rail Supervision     OCCUPATIONAL THERAPY:    Tub/shower:   Current level: Moderate Assist to extended tub bench  Short term tub/shower goal: Minimum assistance   Long term tub/shower goal: Minimum assistance     Feeding:  Current level: Minimum assistance   Short term feeding goal: Supervision   Long term feeding goal: Modified Independent     Grooming:   Current level: Moderate Assist   Short term grooming goal: Minimum assistance   Long term grooming goal: Supervision     Bathing:  Current level: Max Assist   Short term bathing goal: Moderate Assist   Long term bathing goal: Minimum assistance     Homemaking:   Current level: light task Max Assist   Short term homemaking goal: Moderate Assist   Long term homemaking goal: Minimum assistance     Upper body dressing:  Current level: Moderate Assist   Short term upper body dressing goal: Minimum assistance. Long term upper body dressing goal: Supervision     Lower body dressing:  Current level:  Max Assist   Short term lower body dressing goal: Moderate Assist   Long term lower body dressing goal:

## 2019-07-19 NOTE — PROGRESS NOTES
inch step with AAD and Jennifer 4 inch step with AAD and sup    BLE ROM WFL WNL      BLE Strength Bilateral LE: 4-/5 Bilateral LE: 4-/5      Balance  Sitting: sup  Standing: mod/maxA with ww Static and dynamic standing balance CGA/Jennifer without AD Static and dynamic standing balance CGA/Jennifer without AD     Date Family Teach Completed TBA       Is additional Family Teaching Needed? Y or N Y pending improvement Y Y     Hindering Progress Safety awareness, clonus/flexor withdrawal L LE, coordination Balance/ coordination and sensory deficits Balance/ coordination     PT recommended ELOS 4-5 weeks       Team's Discharge Plan        Therapist at Team Meeting          Therapeutic Exercise:   AM: Sit<>Stand transfer 3 x 10 reps from edge of progressively lowered mat table with CGA/SBA  Toe taps onto 2 inch step alternating leading LE without UE support 2 x 10 reps BLE with Jennifer  PM:   Partial squat emphasis on anterior trunk lean and to avoid excessive posterior displacement of COM 2 x 10 reps   FW NR agility ladder negotiation x 3 reps leading with RLE, x 3 reps leading with LLE with Jennifer  FW/lateral agility ladder negotiation without AD x 2 reps with Jennifer    Patient education  Pt educated on adequate anterior lean with sit<>Stand transfer    Patient response to education:   Pt verbalized understanding Pt demonstrated skill Pt requires further education in this area   yes partial yes     Additional Comments: Pt exhibits inadequate anterior lean and initial retropulsion with stand from [facility], able to stand from edge of mat table without manual assistance following cueing to scoot to EOB and for foot placement. Pt easily distracted and has impaired righting reactions with LOB. Increased assistance required to exit car this session due to inadequate anterior lean with stand. Minimal attempts to correct LOB when this occurs while ambulating, pt has significantly impaired righting reactions.  Plan to progress activity with emphasis on

## 2019-07-20 PROCEDURE — 1280000000 HC REHAB R&B

## 2019-07-20 PROCEDURE — 6370000000 HC RX 637 (ALT 250 FOR IP): Performed by: INTERNAL MEDICINE

## 2019-07-20 PROCEDURE — 6370000000 HC RX 637 (ALT 250 FOR IP): Performed by: PHYSICAL MEDICINE & REHABILITATION

## 2019-07-20 PROCEDURE — 97530 THERAPEUTIC ACTIVITIES: CPT

## 2019-07-20 PROCEDURE — 51701 INSERT BLADDER CATHETER: CPT

## 2019-07-20 PROCEDURE — 51798 US URINE CAPACITY MEASURE: CPT

## 2019-07-20 RX ADMIN — MULTIPLE VITAMINS W/ MINERALS TAB 1 TABLET: TAB at 09:06

## 2019-07-20 RX ADMIN — TAMSULOSIN HYDROCHLORIDE 0.4 MG: 0.4 CAPSULE ORAL at 09:06

## 2019-07-20 RX ADMIN — VITAMIN D, TAB 1000IU (100/BT) 1000 UNITS: 25 TAB at 09:06

## 2019-07-20 RX ADMIN — AMLODIPINE BESYLATE 5 MG: 5 TABLET ORAL at 09:06

## 2019-07-20 RX ADMIN — DOCUSATE SODIUM 100 MG: 100 CAPSULE, LIQUID FILLED ORAL at 20:26

## 2019-07-20 RX ADMIN — LEVOFLOXACIN 500 MG: 500 TABLET, FILM COATED ORAL at 11:29

## 2019-07-20 RX ADMIN — VITAM B12 50 MCG: 100 TAB at 09:07

## 2019-07-20 RX ADMIN — DOCUSATE SODIUM 100 MG: 100 CAPSULE, LIQUID FILLED ORAL at 09:07

## 2019-07-20 RX ADMIN — ESCITALOPRAM OXALATE 20 MG: 10 TABLET, FILM COATED ORAL at 09:06

## 2019-07-20 ASSESSMENT — PAIN SCALES - GENERAL
PAINLEVEL_OUTOF10: 0

## 2019-07-20 NOTE — FLOWSHEET NOTE
07/20/19 1830   Intake   P.O. 60 mL   Output (mL)   Urine 50 mL   Urine Assessment   Incontinence No   Urine Color Yellow/straw   Urine Appearance Clear   Urine Odor No odor   Bladder Scan Volume (mL) 598 mL   $ Bladder scan $ Yes   Intermittent/Straight Cath (mL) 625 mL   $ Cath urethra straight $ Yes   Stool Assessment   Incontinence No   Stool Appearance Soft; Formed   Stool Color Brown   Stool Amount Small   Stool Source Rectum   Last BM (including prior to admit) 07/20/19   Unmeasured Output   Urine Occurrence 1   Stool Occurrence 1

## 2019-07-20 NOTE — PLAN OF CARE
Problem: Neurological  Goal: Maximum potential motor/sensory/cognitive function  Outcome: Met This Shift     Problem: Falls - Risk of:  Goal: Will remain free from falls  Description  Will remain free from falls  Outcome: Met This Shift  Goal: Absence of physical injury  Description  Absence of physical injury  Outcome: Met This Shift     Problem: Coping:  Goal: Expressions of feelings of enhanced comfort will increase  Description  Expressions of feelings of enhanced comfort will increase  Outcome: Met This Shift

## 2019-07-21 PROCEDURE — 6370000000 HC RX 637 (ALT 250 FOR IP): Performed by: PHYSICAL MEDICINE & REHABILITATION

## 2019-07-21 PROCEDURE — 6370000000 HC RX 637 (ALT 250 FOR IP): Performed by: INTERNAL MEDICINE

## 2019-07-21 PROCEDURE — 97530 THERAPEUTIC ACTIVITIES: CPT

## 2019-07-21 PROCEDURE — 97535 SELF CARE MNGMENT TRAINING: CPT

## 2019-07-21 PROCEDURE — 51701 INSERT BLADDER CATHETER: CPT

## 2019-07-21 PROCEDURE — 51798 US URINE CAPACITY MEASURE: CPT

## 2019-07-21 PROCEDURE — 1280000000 HC REHAB R&B

## 2019-07-21 RX ORDER — POLYVINYL ALCOHOL 14 MG/ML
1 SOLUTION/ DROPS OPHTHALMIC PRN
Status: DISCONTINUED | OUTPATIENT
Start: 2019-07-21 | End: 2019-08-09 | Stop reason: HOSPADM

## 2019-07-21 RX ADMIN — TAMSULOSIN HYDROCHLORIDE 0.4 MG: 0.4 CAPSULE ORAL at 08:43

## 2019-07-21 RX ADMIN — CYCLOBENZAPRINE 10 MG: 10 TABLET, FILM COATED ORAL at 22:08

## 2019-07-21 RX ADMIN — MULTIPLE VITAMINS W/ MINERALS TAB 1 TABLET: TAB at 08:43

## 2019-07-21 RX ADMIN — DOCUSATE SODIUM 100 MG: 100 CAPSULE, LIQUID FILLED ORAL at 08:43

## 2019-07-21 RX ADMIN — AMLODIPINE BESYLATE 5 MG: 5 TABLET ORAL at 08:43

## 2019-07-21 RX ADMIN — VITAMIN D, TAB 1000IU (100/BT) 1000 UNITS: 25 TAB at 08:43

## 2019-07-21 RX ADMIN — POLYVINYL ALCOHOL 1 DROP: 14 SOLUTION/ DROPS OPHTHALMIC at 09:00

## 2019-07-21 RX ADMIN — FLUTICASONE PROPIONATE 1 SPRAY: 50 SPRAY, METERED NASAL at 08:44

## 2019-07-21 RX ADMIN — DOCUSATE SODIUM 100 MG: 100 CAPSULE, LIQUID FILLED ORAL at 22:08

## 2019-07-21 RX ADMIN — LEVOFLOXACIN 500 MG: 500 TABLET, FILM COATED ORAL at 08:44

## 2019-07-21 RX ADMIN — VITAM B12 50 MCG: 100 TAB at 08:43

## 2019-07-21 RX ADMIN — ESCITALOPRAM OXALATE 20 MG: 10 TABLET, FILM COATED ORAL at 08:43

## 2019-07-21 ASSESSMENT — PAIN SCALES - GENERAL
PAINLEVEL_OUTOF10: 0

## 2019-07-22 PROCEDURE — 97535 SELF CARE MNGMENT TRAINING: CPT

## 2019-07-22 PROCEDURE — 97530 THERAPEUTIC ACTIVITIES: CPT

## 2019-07-22 PROCEDURE — 51701 INSERT BLADDER CATHETER: CPT

## 2019-07-22 PROCEDURE — 97110 THERAPEUTIC EXERCISES: CPT

## 2019-07-22 PROCEDURE — 51798 US URINE CAPACITY MEASURE: CPT

## 2019-07-22 PROCEDURE — 1280000000 HC REHAB R&B

## 2019-07-22 PROCEDURE — 6370000000 HC RX 637 (ALT 250 FOR IP): Performed by: INTERNAL MEDICINE

## 2019-07-22 PROCEDURE — 6370000000 HC RX 637 (ALT 250 FOR IP): Performed by: PHYSICAL MEDICINE & REHABILITATION

## 2019-07-22 RX ORDER — BETHANECHOL CHLORIDE 5 MG
10 TABLET ORAL 3 TIMES DAILY
Status: DISCONTINUED | OUTPATIENT
Start: 2019-07-23 | End: 2019-08-05

## 2019-07-22 RX ADMIN — MULTIPLE VITAMINS W/ MINERALS TAB 1 TABLET: TAB at 08:58

## 2019-07-22 RX ADMIN — VITAMIN D, TAB 1000IU (100/BT) 1000 UNITS: 25 TAB at 08:58

## 2019-07-22 RX ADMIN — DOCUSATE SODIUM 100 MG: 100 CAPSULE, LIQUID FILLED ORAL at 21:35

## 2019-07-22 RX ADMIN — LEVOFLOXACIN 500 MG: 500 TABLET, FILM COATED ORAL at 11:45

## 2019-07-22 RX ADMIN — FLUTICASONE PROPIONATE 1 SPRAY: 50 SPRAY, METERED NASAL at 10:08

## 2019-07-22 RX ADMIN — DOCUSATE SODIUM 100 MG: 100 CAPSULE, LIQUID FILLED ORAL at 08:58

## 2019-07-22 RX ADMIN — VITAM B12 50 MCG: 100 TAB at 08:59

## 2019-07-22 RX ADMIN — ESCITALOPRAM OXALATE 20 MG: 10 TABLET, FILM COATED ORAL at 08:58

## 2019-07-22 RX ADMIN — AMLODIPINE BESYLATE 5 MG: 5 TABLET ORAL at 08:58

## 2019-07-22 RX ADMIN — TAMSULOSIN HYDROCHLORIDE 0.4 MG: 0.4 CAPSULE ORAL at 08:58

## 2019-07-22 ASSESSMENT — PAIN SCALES - GENERAL
PAINLEVEL_OUTOF10: 0

## 2019-07-22 NOTE — PROGRESS NOTES
Physical Therapy    Facility/Department: [address] REHAB  Daily Treatment Note    NAME: Dima Webber  : 1950  MRN: [id number]    Date of Service: 2019    Supervising Therapist: Andry Castro, PT, DPT    ROOM: [address] [place] [address]  DIAGNOSIS: incomplete spinal cord injury   PMH: To ED on 19 for numbness/tingling. s/p C3-6 PCF/laminectomy on 19 after fall before being discharged to SNF. S/P cervical wound exploration/evacuation of posterior cervical epidural hematoma on 7/3/19. PMH includes anxiety, depression, KRISTAL R LE, HTN    PRECAUTIONS: Cervical collar AAT, c-spine precautions, spinal neutral mechanics, falls     Social: Pt lives alone in a 2 floor plan 3-4 steps and 0 rails to enter. 12 steps upstairs with right HR. Prior to admission pt walked with no device but owns ww. Initial Evaluation  19 AM     PM   Short Term Goals Long Term Goals    Was pt agreeable to Eval/treatment? yes yes yes. Does pt have pain?  4/10 neck pain  Pt c/o moderate neck pain No c/o pain     Bed Mobility  Rolling: min/modA  Supine to sit: modA  Sit to supine: maxA  Scooting: modA Scooting Jennifer  Rolling SBA  Supine>Sit Jennifer  Sit>Supine ModA (twin bed) NT sup Mod I   Transfers Sit to stand: mod/maxA  Stand to sit: mod/maxA  Stand pivot: maxA with ww Sit<>Stand ModA from WC, SBA/CGA from mat table  Stand-pivot CGA without AD Sit<>Stand Jennifer/ModA from armchair, SBA from edge of raised mat table  Stand-pivot CGA without AD     Jennifer with AAD Sup with AAD   Ambulation    50 feet with ww with Jennifer 350 feet x 2 reps without AD with CGA   400 feet x 2 reps without AD with CGA   >150 feet with AAD with sba >250 feet with AAD with sup   Wheel Chair Mobility Nt NT NT  400 feet with available extremities with Modified Independent   Car Transfers NT Jennifer NT Jennifer Sup    Stair negotiation: ascended and descended NT 12 steps with 2 HR with CGA (down BW) NT 4 steps with one rail with Jennifer >12 steps with one rail with sup   Curb Step:

## 2019-07-22 NOTE — PROGRESS NOTES
Department of Neurosurgery  Progress Note    CHIEF COMPLAINT: s/p evacuationof cervical  Epidural hematoma    SUBJECTIVE:   No neck pain. REVIEW OF SYSTEMS :  Constitutional: Negative for chills and fever. Neurological: Negative for dizziness, tremors and speech change. OBJECTIVE:   VITALS:  BP (!) 147/69   Pulse 80   Temp 99 °F (37.2 °C) (Temporal)   Resp 18   Ht 5' 4\" (1.626 m)   Wt 187 lb 11.2 oz (85.1 kg)   SpO2 97%   BMI 32.22 kg/m²   PHYSICAL:  CONSTITUTIONAL:  awake, alert, cooperative, no apparent distress, and appears stated age. Bilateral arm and leg weakness: bilateral leg power 4+/5, right arm 4/5, left arm 3/5.   Op site dry    DATA:  CBC:   Lab Results   Component Value Date    WBC 9.0 07/16/2019    RBC 3.84 07/16/2019    HGB 10.8 07/16/2019    HCT 34.6 07/16/2019    MCV 90.1 07/16/2019    MCH 28.1 07/16/2019    MCHC 31.2 07/16/2019    RDW 14.1 07/16/2019     07/16/2019    MPV 9.4 07/16/2019     BMP:    Lab Results   Component Value Date     07/16/2019    K 4.1 07/16/2019    K 3.9 07/07/2019    CL 97 07/16/2019    CO2 28 07/16/2019    BUN 24 07/16/2019    LABALBU 3.6 07/01/2019    CREATININE 0.7 07/16/2019    CALCIUM 10.0 07/16/2019    GFRAA >60 07/16/2019    LABGLOM >60 07/16/2019    GLUCOSE 198 07/16/2019     PT/INR:    Lab Results   Component Value Date    PROTIME 11.2 06/21/2019    INR 1.0 06/21/2019     PTT:  No results found for: APTT, PTT[APTT}    Current Inpatient Medications  Current Facility-Administered Medications: polyvinyl alcohol (LIQUIFILM TEARS) 1.4 % ophthalmic solution 1 drop, 1 drop, Both Eyes, PRN  tamsulosin (FLOMAX) capsule 0.4 mg, 0.4 mg, Oral, Daily  docusate sodium (ENEMEEZ) enema 283 mg, 1 enema, Rectal, Daily PRN  levofloxacin (LEVAQUIN) tablet 500 mg, 500 mg, Oral, Daily  vitamin D (CHOLECALCIFEROL) tablet 1,000 Units, 1,000 Units, Oral, Daily  docusate sodium (COLACE) capsule 100 mg, 100 mg, Oral, BID  oxyCODONE (ROXICODONE) immediate release

## 2019-07-22 NOTE — PROGRESS NOTES
07/22/19 1253   Attendance   Activity Cards   Participation Active participation   [place] Demonstrates ability to complete social goals   Social Skills Cooperates with others in group activity   Leisure Education Demonstrates knowledge of benefits of leisure involvement  (Identified hand coordination as a benefit.)   Time Spent With Patient   Minutes 80

## 2019-07-23 PROCEDURE — 97530 THERAPEUTIC ACTIVITIES: CPT

## 2019-07-23 PROCEDURE — 51701 INSERT BLADDER CATHETER: CPT

## 2019-07-23 PROCEDURE — 97110 THERAPEUTIC EXERCISES: CPT

## 2019-07-23 PROCEDURE — 6370000000 HC RX 637 (ALT 250 FOR IP): Performed by: PHYSICAL MEDICINE & REHABILITATION

## 2019-07-23 PROCEDURE — 1280000000 HC REHAB R&B

## 2019-07-23 PROCEDURE — 51798 US URINE CAPACITY MEASURE: CPT

## 2019-07-23 PROCEDURE — 97535 SELF CARE MNGMENT TRAINING: CPT

## 2019-07-23 PROCEDURE — 6370000000 HC RX 637 (ALT 250 FOR IP): Performed by: INTERNAL MEDICINE

## 2019-07-23 RX ADMIN — TAMSULOSIN HYDROCHLORIDE 0.4 MG: 0.4 CAPSULE ORAL at 08:56

## 2019-07-23 RX ADMIN — MULTIPLE VITAMINS W/ MINERALS TAB 1 TABLET: TAB at 08:56

## 2019-07-23 RX ADMIN — ESCITALOPRAM OXALATE 20 MG: 10 TABLET, FILM COATED ORAL at 08:55

## 2019-07-23 RX ADMIN — LEVOFLOXACIN 500 MG: 500 TABLET, FILM COATED ORAL at 08:56

## 2019-07-23 RX ADMIN — ACETAMINOPHEN 650 MG: 325 TABLET, FILM COATED ORAL at 12:47

## 2019-07-23 RX ADMIN — BETHANECHOL CHLORIDE 10 MG: 5 TABLET ORAL at 08:54

## 2019-07-23 RX ADMIN — VITAMIN D, TAB 1000IU (100/BT) 1000 UNITS: 25 TAB at 08:55

## 2019-07-23 RX ADMIN — BETHANECHOL CHLORIDE 10 MG: 5 TABLET ORAL at 21:04

## 2019-07-23 RX ADMIN — DOCUSATE SODIUM 100 MG: 100 CAPSULE, LIQUID FILLED ORAL at 08:56

## 2019-07-23 RX ADMIN — DOCUSATE SODIUM 100 MG: 100 CAPSULE, LIQUID FILLED ORAL at 21:03

## 2019-07-23 RX ADMIN — AMLODIPINE BESYLATE 5 MG: 5 TABLET ORAL at 08:55

## 2019-07-23 RX ADMIN — CYCLOBENZAPRINE 10 MG: 10 TABLET, FILM COATED ORAL at 08:55

## 2019-07-23 RX ADMIN — BETHANECHOL CHLORIDE 10 MG: 5 TABLET ORAL at 13:06

## 2019-07-23 RX ADMIN — VITAM B12 50 MCG: 100 TAB at 08:55

## 2019-07-23 ASSESSMENT — PAIN SCALES - GENERAL
PAINLEVEL_OUTOF10: 0
PAINLEVEL_OUTOF10: 7
PAINLEVEL_OUTOF10: 4
PAINLEVEL_OUTOF10: 0

## 2019-07-23 ASSESSMENT — PAIN DESCRIPTION - ONSET: ONSET: GRADUAL

## 2019-07-23 ASSESSMENT — PAIN DESCRIPTION - PROGRESSION: CLINICAL_PROGRESSION: GRADUALLY IMPROVING

## 2019-07-23 ASSESSMENT — PAIN DESCRIPTION - LOCATION: LOCATION: NECK

## 2019-07-23 ASSESSMENT — PAIN DESCRIPTION - DESCRIPTORS: DESCRIPTORS: ACHING;TENDER;SORE

## 2019-07-23 ASSESSMENT — PAIN DESCRIPTION - ORIENTATION: ORIENTATION: RIGHT;LEFT

## 2019-07-23 ASSESSMENT — PAIN DESCRIPTION - PAIN TYPE: TYPE: ACUTE PAIN;SURGICAL PAIN

## 2019-07-23 ASSESSMENT — PAIN - FUNCTIONAL ASSESSMENT: PAIN_FUNCTIONAL_ASSESSMENT: ACTIVITIES ARE NOT PREVENTED

## 2019-07-23 ASSESSMENT — PAIN DESCRIPTION - FREQUENCY: FREQUENCY: INTERMITTENT

## 2019-07-23 NOTE — PROGRESS NOTES
07/23/19 1258   Attendance   Activity   (Outside visit)   Participation Active participation   [place] Demonstrates ability to complete social goals   Social Skills Interacts independently in social activity   Leisure Education Demonstrates knowledge of benefits of leisure involvement  (Identifed enjoyment as a benefit.)   Time Spent With Patient   Minutes 45

## 2019-07-23 NOTE — FLOWSHEET NOTE
07/23/19 0630   Intake   P.O. 60 mL   Output (mL)   Urine 600 mL   Urine Assessment   Incontinence No   Urine Color Yellow/straw   Urine Appearance Clear   Urine Odor No odor   Bladder Scan Volume (mL) 525 mL   $ Bladder scan $ Yes   Intermittent/Straight Cath (mL) 450 mL   $ Cath urethra straight $ Yes   Stool Assessment   Incontinence No   Last BM (including prior to admit) 07/22/19   Unmeasured Output   Urine Occurrence 1   Stool Occurrence 0

## 2019-07-24 PROCEDURE — 51701 INSERT BLADDER CATHETER: CPT

## 2019-07-24 PROCEDURE — 97530 THERAPEUTIC ACTIVITIES: CPT

## 2019-07-24 PROCEDURE — 6370000000 HC RX 637 (ALT 250 FOR IP): Performed by: PHYSICAL MEDICINE & REHABILITATION

## 2019-07-24 PROCEDURE — 1280000000 HC REHAB R&B

## 2019-07-24 PROCEDURE — 51798 US URINE CAPACITY MEASURE: CPT

## 2019-07-24 PROCEDURE — 6370000000 HC RX 637 (ALT 250 FOR IP): Performed by: INTERNAL MEDICINE

## 2019-07-24 PROCEDURE — 97110 THERAPEUTIC EXERCISES: CPT

## 2019-07-24 RX ADMIN — VITAMIN D, TAB 1000IU (100/BT) 1000 UNITS: 25 TAB at 09:09

## 2019-07-24 RX ADMIN — CYCLOBENZAPRINE 10 MG: 10 TABLET, FILM COATED ORAL at 09:09

## 2019-07-24 RX ADMIN — MULTIPLE VITAMINS W/ MINERALS TAB 1 TABLET: TAB at 09:10

## 2019-07-24 RX ADMIN — DOCUSATE SODIUM 100 MG: 100 CAPSULE, LIQUID FILLED ORAL at 09:08

## 2019-07-24 RX ADMIN — BETHANECHOL CHLORIDE 10 MG: 5 TABLET ORAL at 14:06

## 2019-07-24 RX ADMIN — TAMSULOSIN HYDROCHLORIDE 0.4 MG: 0.4 CAPSULE ORAL at 09:08

## 2019-07-24 RX ADMIN — AMLODIPINE BESYLATE 5 MG: 5 TABLET ORAL at 09:10

## 2019-07-24 RX ADMIN — ACETAMINOPHEN 650 MG: 325 TABLET, FILM COATED ORAL at 09:08

## 2019-07-24 RX ADMIN — BETHANECHOL CHLORIDE 10 MG: 5 TABLET ORAL at 09:10

## 2019-07-24 RX ADMIN — DOCUSATE SODIUM 100 MG: 100 CAPSULE, LIQUID FILLED ORAL at 21:50

## 2019-07-24 RX ADMIN — VITAM B12 50 MCG: 100 TAB at 09:08

## 2019-07-24 RX ADMIN — LEVOFLOXACIN 500 MG: 500 TABLET, FILM COATED ORAL at 09:10

## 2019-07-24 RX ADMIN — BETHANECHOL CHLORIDE 10 MG: 5 TABLET ORAL at 21:50

## 2019-07-24 RX ADMIN — CYCLOBENZAPRINE 10 MG: 10 TABLET, FILM COATED ORAL at 21:50

## 2019-07-24 RX ADMIN — ESCITALOPRAM OXALATE 20 MG: 10 TABLET, FILM COATED ORAL at 09:09

## 2019-07-24 ASSESSMENT — PAIN SCALES - GENERAL
PAINLEVEL_OUTOF10: 0
PAINLEVEL_OUTOF10: 1
PAINLEVEL_OUTOF10: 0

## 2019-07-24 NOTE — PROGRESS NOTES
hours. Hepatic: No results for input(s): AST, ALT, ALB, BILITOT, ALKPHOS in the last 72 hours. BNP: No results for input(s): BNP in the last 72 hours. Lipids: No results for input(s): CHOL, HDL in the last 72 hours. Invalid input(s): LDLCALCU  INR: No results for input(s): INR in the last 72 hours. Assessment/  Patient Active Problem List:     Cervical stenosis of spinal canal     Essential hypertension     Paresthesia     Epidural hematoma (HCC)     Incomplete spinal cord injury at C1-C4 level without bone injury ([address]      Plan/  1. Continue rehab program  2. Increase activity as able  3. Continue bladder routine and increase medications as necessary to improve emptying  4. Continue dvt prophylaxis  5.  Continue BP control          Keshia Delgado MD

## 2019-07-24 NOTE — PROGRESS NOTES
Department of Neurosurgery  Progress Note    CHIEF COMPLAINT: s/p evacuationof cervical  Epidural hematoma    SUBJECTIVE:   No neck pain. REVIEW OF SYSTEMS :  Constitutional: Negative for chills and fever. Neurological: Negative for dizziness, tremors and speech change. OBJECTIVE:   VITALS:  /76   Pulse 80   Temp 98.4 °F (36.9 °C) (Temporal)   Resp 20   Ht 5' 4\" (1.626 m)   Wt 187 lb 11.2 oz (85.1 kg)   SpO2 94%   BMI 32.22 kg/m²   PHYSICAL:  CONSTITUTIONAL:  awake, alert, cooperative, no apparent distress, and appears stated age. Bilateral arm and leg weakness: bilateral leg power 4+/5, right arm 4/5, left arm 3/5.       DATA:  CBC:   Lab Results   Component Value Date    WBC 9.0 07/16/2019    RBC 3.84 07/16/2019    HGB 10.8 07/16/2019    HCT 34.6 07/16/2019    MCV 90.1 07/16/2019    MCH 28.1 07/16/2019    MCHC 31.2 07/16/2019    RDW 14.1 07/16/2019     07/16/2019    MPV 9.4 07/16/2019     BMP:    Lab Results   Component Value Date     07/16/2019    K 4.1 07/16/2019    K 3.9 07/07/2019    CL 97 07/16/2019    CO2 28 07/16/2019    BUN 24 07/16/2019    LABALBU 3.6 07/01/2019    CREATININE 0.7 07/16/2019    CALCIUM 10.0 07/16/2019    GFRAA >60 07/16/2019    LABGLOM >60 07/16/2019    GLUCOSE 198 07/16/2019     PT/INR:    Lab Results   Component Value Date    PROTIME 11.2 06/21/2019    INR 1.0 06/21/2019     PTT:  No results found for: APTT, PTT[APTT}    Current Inpatient Medications  Current Facility-Administered Medications: bethanechol (URECHOLINE) tablet 10 mg, 10 mg, Oral, TID  polyvinyl alcohol (LIQUIFILM TEARS) 1.4 % ophthalmic solution 1 drop, 1 drop, Both Eyes, PRN  tamsulosin (FLOMAX) capsule 0.4 mg, 0.4 mg, Oral, Daily  docusate sodium (ENEMEEZ) enema 283 mg, 1 enema, Rectal, Daily PRN  levofloxacin (LEVAQUIN) tablet 500 mg, 500 mg, Oral, Daily  vitamin D (CHOLECALCIFEROL) tablet 1,000 Units, 1,000 Units, Oral, Daily  docusate sodium (COLACE) capsule 100 mg, 100 mg, Oral,

## 2019-07-25 PROCEDURE — 6370000000 HC RX 637 (ALT 250 FOR IP): Performed by: PHYSICAL MEDICINE & REHABILITATION

## 2019-07-25 PROCEDURE — 97535 SELF CARE MNGMENT TRAINING: CPT

## 2019-07-25 PROCEDURE — 97530 THERAPEUTIC ACTIVITIES: CPT

## 2019-07-25 PROCEDURE — 1280000000 HC REHAB R&B

## 2019-07-25 PROCEDURE — 6370000000 HC RX 637 (ALT 250 FOR IP): Performed by: INTERNAL MEDICINE

## 2019-07-25 PROCEDURE — 97110 THERAPEUTIC EXERCISES: CPT

## 2019-07-25 PROCEDURE — 51798 US URINE CAPACITY MEASURE: CPT

## 2019-07-25 RX ADMIN — TAMSULOSIN HYDROCHLORIDE 0.4 MG: 0.4 CAPSULE ORAL at 08:35

## 2019-07-25 RX ADMIN — ESCITALOPRAM OXALATE 20 MG: 10 TABLET, FILM COATED ORAL at 08:35

## 2019-07-25 RX ADMIN — BETHANECHOL CHLORIDE 10 MG: 5 TABLET ORAL at 13:20

## 2019-07-25 RX ADMIN — FLUTICASONE PROPIONATE 1 SPRAY: 50 SPRAY, METERED NASAL at 08:33

## 2019-07-25 RX ADMIN — LEVOFLOXACIN 500 MG: 500 TABLET, FILM COATED ORAL at 08:35

## 2019-07-25 RX ADMIN — POLYVINYL ALCOHOL 1 DROP: 14 SOLUTION/ DROPS OPHTHALMIC at 08:35

## 2019-07-25 RX ADMIN — BETHANECHOL CHLORIDE 10 MG: 5 TABLET ORAL at 08:35

## 2019-07-25 RX ADMIN — MULTIPLE VITAMINS W/ MINERALS TAB 1 TABLET: TAB at 08:35

## 2019-07-25 RX ADMIN — VITAMIN D, TAB 1000IU (100/BT) 1000 UNITS: 25 TAB at 08:35

## 2019-07-25 RX ADMIN — DOCUSATE SODIUM 100 MG: 100 CAPSULE, LIQUID FILLED ORAL at 08:35

## 2019-07-25 RX ADMIN — VITAM B12 50 MCG: 100 TAB at 08:37

## 2019-07-25 RX ADMIN — BETHANECHOL CHLORIDE 10 MG: 5 TABLET ORAL at 21:01

## 2019-07-25 RX ADMIN — AMLODIPINE BESYLATE 5 MG: 5 TABLET ORAL at 08:35

## 2019-07-25 RX ADMIN — DOCUSATE SODIUM 100 MG: 100 CAPSULE, LIQUID FILLED ORAL at 21:02

## 2019-07-25 ASSESSMENT — PAIN SCALES - GENERAL
PAINLEVEL_OUTOF10: 0

## 2019-07-25 NOTE — PROGRESS NOTES
Progress Note    Subjective/   77y.o. year old female on the rehab unit for spinal cord injury. She denies complaints. Tolerating therapy program. Seen ambulating in hallway. No pain complaints. No SOB or chest pain. Objective/   VITALS:  /76   Pulse 83   Temp 99.5 °F (37.5 °C) (Temporal)   Resp 20   Ht 5' 4\" (1.626 m)   Wt 187 lb 11.2 oz (85.1 kg)   SpO2 94%   BMI 32.22 kg/m²   24HR INTAKE/OUTPUT:      Intake/Output Summary (Last 24 hours) at 7/24/2019 2331  Last data filed at 7/24/2019 2305  Gross per 24 hour   Intake 1280 ml   Output 2800 ml   Net -1520 ml     Constitutional:  Alert, awake, no apparent distress   Cardiovascular:  S1, S2 without m/r/g   Respiratory:  CTA B without w/r/r   Abdomen: +BS  Ext: no pitting LE edema  Neuro: weakness in UEs > LEs without change. Functional Level    Initial Evaluation  7/7/19 AM     PM   Short Term Goals Long Term Goals    Was pt agreeable to Eval/treatment? yes yes yes.        Does pt have pain?  4/10 neck pain  Pt c/o moderate neck pain No c/o pain       Bed Mobility  Rolling: min/modA  Supine to sit: modA  Sit to supine: maxA  Scooting: modA NT Sit>Supine SBA  Supine>Sit ModA  Scooting/rolling sup Mod I   Transfers Sit to stand: mod/maxA  Stand to sit: mod/maxA  Stand pivot: maxA with ww Sit<>Stand Jennifer from low chair CGA from WC  Stand-pivot CGA without AD Sit<>Stand Jennifer/ModA from armchair, SBA from edge of raised mat table  Stand-pivot CGA without AD       Jennifer with AAD  with AAD modified independent   Ambulation    50 feet with ww with Jennifer 375 feet x 2 reps without AD with CGA    400 feet x 3 reps without AD with SBA/CGA    >150 feet with AAD with sba >250 feet with AAD with  modified independent   Wheel Chair Mobility Nt NT NT   400 feet with available extremities with Modified Independent   Car Transfers NT CGA NT Jennifer Sup    Stair negotiation: ascended and descended NT 12 steps with 1 HR with CGA (down BW) NT 4 steps with one

## 2019-07-25 NOTE — PROGRESS NOTES
Physical Therapy    Facility/Department: [address] [facility]AB  Daily Treatment Note    NAME: Tara Beverly  : 1950  MRN: [id number]    Date of Service: 2019    Supervising Therapist: Eddie Baires, PT, DPT    ROOM: [address] [place] [address]  DIAGNOSIS: incomplete spinal cord injury   PMH: To ED on 19 for numbness/tingling. s/p C3-6 PCF/laminectomy on 19 after fall before being discharged to SNF. S/P cervical wound exploration/evacuation of posterior cervical epidural hematoma on 7/3/19. PMH includes anxiety, depression, KRISTAL R LE, HTN    PRECAUTIONS: Cervical collar AAT, c-spine precautions, spinal neutral mechanics, falls     Social: Pt lives alone in a 2 floor plan 3-4 steps and 0 rails to enter. 12 steps upstairs with right HR. Prior to admission pt walked with no device but owns ww. Initial Evaluation  19 AM     PM   Short Term Goals Long Term Goals    Was pt agreeable to Eval/treatment? yes yes yes. Does pt have pain?  10 neck pain  Pt c/o moderate neck pain No c/o pain     Bed Mobility  Rolling: min/modA  Supine to sit: modA  Sit to supine: maxA  Scooting: modA SBA all aspects (hospital bed with rails)    Sit>Supine SBA  Supine>Sit ModA  Scooting/rolling (twin bed) NT sup Mod I   Transfers Sit to stand: mod/maxA  Stand to sit: mod/maxA  Stand pivot: maxA with ww Sit<>Stand CGA from WC  Stand-pivot CGA without AD Sit<>Stand CGA from WC  Stand-pivot CGA without AD     Jennifer with AAD  with AAD modified independent   Ambulation    50 feet with ww with Jennifer 375 feet x 3 reps without AD with CGA   375 feet x 2 reps without AD with CGA   >150 feet with AAD with sba >250 feet with AAD with  modified independent   Wheel Chair Mobility Nt  feet with BLE with modified Independent  400 feet with available extremities with Modified Independent   Car Transfers NT NT CGA Jennifer Sup    Stair negotiation: ascended and descended NT 12 steps with 1 HR with CGA (down BW) NT 4 steps with one rail with Jennifer >12 provided to slow speed, maintain adequate JAMES, and correct R lateral trunk lean. Pt's distractibility results in occasional LOB requiring Ashwin to correct. AM  Time in: 1045  Time out: 1130    PM  Time in: 1515  Time out: 1600    Pt is making good progress toward established Physical Therapy goals. Continue with physical therapy current plan of care.     Zach Grullon, PT, DPT  [id number]

## 2019-07-25 NOTE — PROGRESS NOTES
Occupational Therapy  OCCUPATIONAL THERAPY DAILY NOTE    Date:2019  Patient Name: Jesse Butler  MRN: [id number]  : 1950  Room: [address] [place] [address]     DIAGNOSIS: ISCI  Pertinent History: To ED on 19 for numbness/tingling. s/p C3-6 PCF/laminectomy on 19 after fall before being discharged to SNF. S/P cervical wound exploration/evacuation of posterior cervical epidural hematoma on 7/3/19. PMH includes anxiety, depression, KRISTAL R LE, HTN    Precautions: Cervical collar AAT, c-spine precautions, spinal neutral mechanics, falls (had an assist to ground 7/15 with RN)     Social History: Pt lives alone in a 2 floor plan 3-4 steps and 0 rails to enter. 12 steps upstairs with right HR. Prior to admission pt walked with no device but owns ww. Functional Assessment:   Date Status AE  Comments   Feeding 19 SBA after set up Soup spoon, red foam, handled mug     Grooming 19 Mod A     Bathing 19 UB- Max A  LB-Maximal Assist      UB Dressing 19 Max A  Pt able to thread BUEs into shirt and required assistance to manage over elbows and to pull shirt over head and down back. Assistance to pull over head to doff. LB Dressing 19 Max A Reacher, long handle shoe horn    Homemaking 19  CGA none      Functional Transfers / Balance:   Date Status DME  Comments   Sit Balance 19 SBA     Stand Balance 19 SBA/CGA Gait belt or grab bars During functional mobility in kitchen    [x] Tub  [x] Shower   Transfer 19 CGA No device extended tub bench    Commode   Transfer 19 CGA No device  Grab bar    Functional   Mobility 19 CGA/SBA No device Ambulated to and from therapy   Other: sit to stand  Bed>w/c    Supine to EOB 19 Min A    SBA    min assist       Functional Exercises / Activity:  Mod resistive theraputty activity with focus on BUE ROM and B hand coordination and [address].  Pt rolled out putty with wheel and retrieved coins from putty and placed into

## 2019-07-26 PROCEDURE — 6370000000 HC RX 637 (ALT 250 FOR IP): Performed by: PHYSICAL MEDICINE & REHABILITATION

## 2019-07-26 PROCEDURE — 97530 THERAPEUTIC ACTIVITIES: CPT

## 2019-07-26 PROCEDURE — 1280000000 HC REHAB R&B

## 2019-07-26 PROCEDURE — 51798 US URINE CAPACITY MEASURE: CPT

## 2019-07-26 PROCEDURE — 97535 SELF CARE MNGMENT TRAINING: CPT

## 2019-07-26 PROCEDURE — 6370000000 HC RX 637 (ALT 250 FOR IP): Performed by: INTERNAL MEDICINE

## 2019-07-26 RX ADMIN — TAMSULOSIN HYDROCHLORIDE 0.4 MG: 0.4 CAPSULE ORAL at 08:44

## 2019-07-26 RX ADMIN — MULTIPLE VITAMINS W/ MINERALS TAB 1 TABLET: TAB at 08:44

## 2019-07-26 RX ADMIN — DOCUSATE SODIUM 100 MG: 100 CAPSULE, LIQUID FILLED ORAL at 21:07

## 2019-07-26 RX ADMIN — BETHANECHOL CHLORIDE 10 MG: 5 TABLET ORAL at 12:59

## 2019-07-26 RX ADMIN — VITAM B12 50 MCG: 100 TAB at 08:44

## 2019-07-26 RX ADMIN — DOCUSATE SODIUM 100 MG: 100 CAPSULE, LIQUID FILLED ORAL at 08:44

## 2019-07-26 RX ADMIN — BETHANECHOL CHLORIDE 10 MG: 5 TABLET ORAL at 21:06

## 2019-07-26 RX ADMIN — AMLODIPINE BESYLATE 5 MG: 5 TABLET ORAL at 08:44

## 2019-07-26 RX ADMIN — LEVOFLOXACIN 500 MG: 500 TABLET, FILM COATED ORAL at 08:44

## 2019-07-26 RX ADMIN — VITAMIN D, TAB 1000IU (100/BT) 1000 UNITS: 25 TAB at 08:44

## 2019-07-26 RX ADMIN — ESCITALOPRAM OXALATE 20 MG: 10 TABLET, FILM COATED ORAL at 08:44

## 2019-07-26 RX ADMIN — BETHANECHOL CHLORIDE 10 MG: 5 TABLET ORAL at 08:44

## 2019-07-26 ASSESSMENT — PAIN SCALES - GENERAL
PAINLEVEL_OUTOF10: 0

## 2019-07-26 NOTE — PATIENT CARE CONFERENCE
independent      Ambulation:   Current level: 375 ft , no device at Contact guard assist, easily distracted  Short term ambulation goal: met  Long term ambulation goal: 400 ft Modified independent      Car transfers:   Current level: Contact guard assist  Short term car transfers goal: min assist  Long term car transfers goal:supervision    Stairs:   Current level : 12 with 1 rail at Contact guard assist  Short term stairs goal: met  Long term stairs goal: 12 at supervision    Lower Extremity Strength Issues:  4-/5 bilateral lowers    Other comments: static and dynamic standing balance are Contact guard assist without a device      OCCUPATIONAL THERAPY:      Tub/shower:   Current level: Contact guard assist with extended tub bench  Short term tub/shower goal: met  Long term tub/shower goal: met      Feeding:  Current level: supervision using adaptive equipment  Short term feeding goal: supervision  Long term feeding goal: supervision    Grooming:   Current level: mod assist  Short term grooming goal: min assist  Long term grooming goal: supervision    Bathing:  Current level: max assist  Short term bathing goal: min assist  Long term bathing goal: min assist    Homemaking:   Current level: laundry, min assist  Short term homemaking goal: min assist  Long term homemaking goal: min assist    Upper body dressing:  Current level: max assist  Short term upper body dressing goal: min assist  Long term upper body dressing goal: supervision    Lower body dressing:  Current level: max assist  Short term lower body dressing goal: mod assist  Long term lower body dressing goal: min assist    Toilet transfer:   Current level: min assist-Contact guard assist  Short term toilet transfer goal: Contact guard assist  Long term toilet transfer goal: Contact guard assist      Safety awareness: fair -    Patient/family's personal goals: \"get home\"  Factors supporting goal achievement:  making significant gains over past week  Factors

## 2019-07-26 NOTE — FLOWSHEET NOTE
07/25/19 2306 07/26/19 0700 07/26/19 0804   Output (mL)   Urine 450 mL 1000 mL  (over 1000 cc)  --    Urine Assessment   Incontinence  --  No  --    Urine Color  --  Yellow/straw  --    Urine Appearance  --  Clear  --    Urine Odor  --  No odor  --    Bladder Scan Volume (mL)  --   --  113 mL  (PVR after void)   $ Bladder scan  --   --  $ Yes   No need for ICP.

## 2019-07-27 PROCEDURE — 6370000000 HC RX 637 (ALT 250 FOR IP): Performed by: INTERNAL MEDICINE

## 2019-07-27 PROCEDURE — 6370000000 HC RX 637 (ALT 250 FOR IP): Performed by: PHYSICAL MEDICINE & REHABILITATION

## 2019-07-27 PROCEDURE — 1280000000 HC REHAB R&B

## 2019-07-27 PROCEDURE — 51798 US URINE CAPACITY MEASURE: CPT

## 2019-07-27 PROCEDURE — 97530 THERAPEUTIC ACTIVITIES: CPT

## 2019-07-27 RX ADMIN — CYCLOBENZAPRINE 10 MG: 10 TABLET, FILM COATED ORAL at 21:05

## 2019-07-27 RX ADMIN — BETHANECHOL CHLORIDE 10 MG: 5 TABLET ORAL at 21:05

## 2019-07-27 RX ADMIN — ESCITALOPRAM OXALATE 20 MG: 10 TABLET, FILM COATED ORAL at 08:44

## 2019-07-27 RX ADMIN — VITAMIN D, TAB 1000IU (100/BT) 1000 UNITS: 25 TAB at 08:44

## 2019-07-27 RX ADMIN — BETHANECHOL CHLORIDE 10 MG: 5 TABLET ORAL at 13:23

## 2019-07-27 RX ADMIN — ACETAMINOPHEN 650 MG: 325 TABLET, FILM COATED ORAL at 21:06

## 2019-07-27 RX ADMIN — VITAM B12 50 MCG: 100 TAB at 08:44

## 2019-07-27 RX ADMIN — DOCUSATE SODIUM 100 MG: 100 CAPSULE, LIQUID FILLED ORAL at 08:44

## 2019-07-27 RX ADMIN — AMLODIPINE BESYLATE 5 MG: 5 TABLET ORAL at 08:44

## 2019-07-27 RX ADMIN — TAMSULOSIN HYDROCHLORIDE 0.4 MG: 0.4 CAPSULE ORAL at 08:45

## 2019-07-27 RX ADMIN — BETHANECHOL CHLORIDE 10 MG: 5 TABLET ORAL at 08:44

## 2019-07-27 RX ADMIN — MULTIPLE VITAMINS W/ MINERALS TAB 1 TABLET: TAB at 08:44

## 2019-07-27 ASSESSMENT — PAIN SCALES - GENERAL
PAINLEVEL_OUTOF10: 2
PAINLEVEL_OUTOF10: 0

## 2019-07-27 ASSESSMENT — PAIN DESCRIPTION - FREQUENCY: FREQUENCY: INTERMITTENT

## 2019-07-27 ASSESSMENT — PAIN DESCRIPTION - PAIN TYPE: TYPE: ACUTE PAIN

## 2019-07-27 ASSESSMENT — PAIN DESCRIPTION - ONSET: ONSET: GRADUAL

## 2019-07-27 ASSESSMENT — PAIN DESCRIPTION - LOCATION: LOCATION: NECK

## 2019-07-27 ASSESSMENT — PAIN - FUNCTIONAL ASSESSMENT: PAIN_FUNCTIONAL_ASSESSMENT: ACTIVITIES ARE NOT PREVENTED

## 2019-07-27 ASSESSMENT — PAIN DESCRIPTION - PROGRESSION: CLINICAL_PROGRESSION: GRADUALLY IMPROVING

## 2019-07-27 ASSESSMENT — PAIN DESCRIPTION - DESCRIPTORS: DESCRIPTORS: ACHING;DISCOMFORT;DULL

## 2019-07-27 NOTE — PLAN OF CARE
Problem: Pain:  Goal: Pain level will decrease  Description  Pain level will decrease  Outcome: Met This Shift     Problem: Falls - Risk of:  Goal: Will remain free from falls  Description  Will remain free from falls  Outcome: Met This Shift     Problem: Infection - Surgical Site:  Goal: Will show no infection signs and symptoms  Description  Will show no infection signs and symptoms  Outcome: Met This Shift

## 2019-07-28 PROCEDURE — 1280000000 HC REHAB R&B

## 2019-07-28 PROCEDURE — 51798 US URINE CAPACITY MEASURE: CPT

## 2019-07-28 PROCEDURE — 97530 THERAPEUTIC ACTIVITIES: CPT

## 2019-07-28 PROCEDURE — 6370000000 HC RX 637 (ALT 250 FOR IP): Performed by: PHYSICAL MEDICINE & REHABILITATION

## 2019-07-28 PROCEDURE — 6370000000 HC RX 637 (ALT 250 FOR IP): Performed by: INTERNAL MEDICINE

## 2019-07-28 RX ADMIN — ESCITALOPRAM OXALATE 20 MG: 10 TABLET, FILM COATED ORAL at 09:09

## 2019-07-28 RX ADMIN — AMLODIPINE BESYLATE 5 MG: 5 TABLET ORAL at 09:09

## 2019-07-28 RX ADMIN — BETHANECHOL CHLORIDE 10 MG: 5 TABLET ORAL at 20:26

## 2019-07-28 RX ADMIN — DOCUSATE SODIUM 100 MG: 100 CAPSULE, LIQUID FILLED ORAL at 20:25

## 2019-07-28 RX ADMIN — CYCLOBENZAPRINE 10 MG: 10 TABLET, FILM COATED ORAL at 20:25

## 2019-07-28 RX ADMIN — BETHANECHOL CHLORIDE 10 MG: 5 TABLET ORAL at 13:58

## 2019-07-28 RX ADMIN — TAMSULOSIN HYDROCHLORIDE 0.4 MG: 0.4 CAPSULE ORAL at 09:09

## 2019-07-28 RX ADMIN — VITAM B12 50 MCG: 100 TAB at 09:09

## 2019-07-28 RX ADMIN — MULTIPLE VITAMINS W/ MINERALS TAB 1 TABLET: TAB at 09:09

## 2019-07-28 RX ADMIN — BETHANECHOL CHLORIDE 10 MG: 5 TABLET ORAL at 09:09

## 2019-07-28 RX ADMIN — DOCUSATE SODIUM 100 MG: 100 CAPSULE, LIQUID FILLED ORAL at 09:09

## 2019-07-28 ASSESSMENT — PAIN SCALES - GENERAL
PAINLEVEL_OUTOF10: 0

## 2019-07-28 NOTE — FLOWSHEET NOTE
07/28/19 1915   Output (mL)   Urine 650 mL   Urine Assessment   Incontinence No   Urine Color Yellow/straw   Urine Appearance Clear   Urine Odor No odor   Bladder Scan Volume (mL) 68 mL   $ Bladder scan $ Yes     Patient up to voide. Urinated 650 mL. PVR scan for 68 mL.

## 2019-07-28 NOTE — PROGRESS NOTES
Occupational Therapy  OCCUPATIONAL THERAPY DAILY NOTE    Date:2019  Patient Name: Renard Guzman  MRN: [id number]  : 1950  Room: [address] [place] [address]     DIAGNOSIS: ISCI  Pertinent History: To ED on 19 for numbness/tingling. s/p C3-6 PCF/laminectomy on 19 after fall before being discharged to SNF. S/P cervical wound exploration/evacuation of posterior cervical epidural hematoma on 7/3/19. PMH includes anxiety, depression, KRISTAL R LE, HTN    Precautions: Cervical collar AAT, c-spine precautions, spinal neutral mechanics, falls (had an assist to ground 7/15 with RN)     Social History: Pt lives alone in a 2 floor plan 3-4 steps and 0 rails to enter. 12 steps upstairs with right HR. Prior to admission pt walked with no device but owns ww. Functional Assessment:   Date Status AE  Comments   Feeding 19 Set up      Grooming 19 Mod A     Bathing 19 UB- Max A  LB-Maximal Assist      UB Dressing 19 Max A     LB Dressing 19 Min A  long handle shoe horn To doff socks and don slip on shoes   Homemaking 19  CGA none      Functional Transfers / Balance:   Date Status DME  Comments   Sit Balance 19 SBA     Stand Balance 19 CGA     [x] Tub  [x] Shower   Transfer 19 CGA No device extended tub bench    Commode   Transfer 19 CGA No device  Grab bar    Functional   Mobility 19 CGA No device . Other: sit to stand  Bed>w/c    Supine to EOB 19 Max  A    SBA    min assist  Pt Mod A for sit<->stand from w/c, Max A for sit<->stand from solid recliner, Mod A for sit<->stand from couch, and Min A for sit<->stand from soft recliner and wooden chair. Pt did not push from surface due to shoulder pain. Functional Exercises / Activity:  Pt completed dynamic seated activity retrieving items using reacher and placing in container to increase reacher knowledge for increased independence with LB ADL's.     Sensory / Neuromuscular

## 2019-07-29 PROCEDURE — 6370000000 HC RX 637 (ALT 250 FOR IP): Performed by: PHYSICAL MEDICINE & REHABILITATION

## 2019-07-29 PROCEDURE — 97530 THERAPEUTIC ACTIVITIES: CPT

## 2019-07-29 PROCEDURE — 6370000000 HC RX 637 (ALT 250 FOR IP): Performed by: INTERNAL MEDICINE

## 2019-07-29 PROCEDURE — 97110 THERAPEUTIC EXERCISES: CPT | Performed by: OCCUPATIONAL THERAPY ASSISTANT

## 2019-07-29 PROCEDURE — 51798 US URINE CAPACITY MEASURE: CPT

## 2019-07-29 PROCEDURE — 1280000000 HC REHAB R&B

## 2019-07-29 PROCEDURE — 97530 THERAPEUTIC ACTIVITIES: CPT | Performed by: OCCUPATIONAL THERAPY ASSISTANT

## 2019-07-29 RX ADMIN — DOCUSATE SODIUM 100 MG: 100 CAPSULE, LIQUID FILLED ORAL at 21:41

## 2019-07-29 RX ADMIN — DOCUSATE SODIUM 100 MG: 100 CAPSULE, LIQUID FILLED ORAL at 09:48

## 2019-07-29 RX ADMIN — BETHANECHOL CHLORIDE 10 MG: 5 TABLET ORAL at 21:41

## 2019-07-29 RX ADMIN — BETHANECHOL CHLORIDE 10 MG: 5 TABLET ORAL at 09:50

## 2019-07-29 RX ADMIN — BETHANECHOL CHLORIDE 10 MG: 5 TABLET ORAL at 14:44

## 2019-07-29 RX ADMIN — VITAMIN D, TAB 1000IU (100/BT) 1000 UNITS: 25 TAB at 09:51

## 2019-07-29 RX ADMIN — ESCITALOPRAM OXALATE 20 MG: 10 TABLET, FILM COATED ORAL at 09:51

## 2019-07-29 RX ADMIN — TAMSULOSIN HYDROCHLORIDE 0.4 MG: 0.4 CAPSULE ORAL at 09:49

## 2019-07-29 RX ADMIN — MULTIPLE VITAMINS W/ MINERALS TAB 1 TABLET: TAB at 09:52

## 2019-07-29 RX ADMIN — AMLODIPINE BESYLATE 5 MG: 5 TABLET ORAL at 09:52

## 2019-07-29 RX ADMIN — VITAM B12 50 MCG: 100 TAB at 09:49

## 2019-07-29 ASSESSMENT — PAIN SCALES - GENERAL: PAINLEVEL_OUTOF10: 0

## 2019-07-30 PROCEDURE — 97535 SELF CARE MNGMENT TRAINING: CPT

## 2019-07-30 PROCEDURE — 6370000000 HC RX 637 (ALT 250 FOR IP): Performed by: INTERNAL MEDICINE

## 2019-07-30 PROCEDURE — 97530 THERAPEUTIC ACTIVITIES: CPT

## 2019-07-30 PROCEDURE — 6370000000 HC RX 637 (ALT 250 FOR IP): Performed by: PHYSICAL MEDICINE & REHABILITATION

## 2019-07-30 PROCEDURE — 1280000000 HC REHAB R&B

## 2019-07-30 PROCEDURE — 97110 THERAPEUTIC EXERCISES: CPT

## 2019-07-30 RX ADMIN — VITAMIN D, TAB 1000IU (100/BT) 1000 UNITS: 25 TAB at 08:29

## 2019-07-30 RX ADMIN — VITAM B12 50 MCG: 100 TAB at 08:28

## 2019-07-30 RX ADMIN — BETHANECHOL CHLORIDE 10 MG: 5 TABLET ORAL at 14:16

## 2019-07-30 RX ADMIN — AMLODIPINE BESYLATE 5 MG: 5 TABLET ORAL at 08:29

## 2019-07-30 RX ADMIN — DOCUSATE SODIUM 100 MG: 100 CAPSULE, LIQUID FILLED ORAL at 21:19

## 2019-07-30 RX ADMIN — DOCUSATE SODIUM 100 MG: 100 CAPSULE, LIQUID FILLED ORAL at 08:28

## 2019-07-30 RX ADMIN — BETHANECHOL CHLORIDE 10 MG: 5 TABLET ORAL at 21:19

## 2019-07-30 RX ADMIN — ESCITALOPRAM OXALATE 20 MG: 10 TABLET, FILM COATED ORAL at 08:29

## 2019-07-30 RX ADMIN — MULTIPLE VITAMINS W/ MINERALS TAB 1 TABLET: TAB at 08:29

## 2019-07-30 RX ADMIN — TAMSULOSIN HYDROCHLORIDE 0.4 MG: 0.4 CAPSULE ORAL at 08:28

## 2019-07-30 RX ADMIN — BETHANECHOL CHLORIDE 10 MG: 5 TABLET ORAL at 08:29

## 2019-07-30 ASSESSMENT — PAIN SCALES - GENERAL
PAINLEVEL_OUTOF10: 0
PAINLEVEL_OUTOF10: 0

## 2019-07-30 NOTE — PROGRESS NOTES
Occupational Therapy  OCCUPATIONAL THERAPY DAILY NOTE    Date:2019  Patient Name: Reinaldo Noguera  MRN: [id number]  : 1950  Room: [address] [place] [address]     DIAGNOSIS: ISCI  Pertinent History: To ED on 19 for numbness/tingling. s/p C3-6 PCF/laminectomy on 19 after fall before being discharged to SNF. S/P cervical wound exploration/evacuation of posterior cervical epidural hematoma on 7/3/19. PMH includes anxiety, depression, KRISTAL R LE, HTN    Precautions: Cervical collar AAT, c-spine precautions, spinal neutral mechanics, falls (had an assist to ground 7/15 with RN)     Social History: Pt lives alone in a 2 floor plan 3-4 steps and 0 rails to enter. 12 steps upstairs with right HR. Prior to admission pt walked with no device but owns ww.      Functional Assessment:   Date Status AE  Comments   Feeding 19 Mod I       Grooming 19  Min A      Bathing 19  Max A      UB Dressing 19  Mod A      LB Dressing 19  Min A/Mod A  long handle shoe horn    Homemaking 19   set up (laundry)  Pt completed laundry folding seated      Functional Transfers / Balance:   Date Status DME  Comments   Sit Balance 19 SBA     Stand Balance 19 CGA     [x] Tub        [x] Shower   Transfer 19  CGA        Min A  No device extended tub bench  Grab rail and shower seat    Commode   Transfer    toileting  19           CGA      Max A  No device  Grab bar    Functional   Mobility 19 CGA No device- gait belt  Throughout pt's room, on unit and in OT clinic    Other: sit to stand from chair with arms      Bed mobility      19      CGA            Min A    No device              Assist going from sit to supine      Functional Exercises / Activity:  Towel stretches to BUE's up incline wedge 3 sets 15 reps in all planes with focus on improving shld AROM    B hand strength with 3 # digi flex 3 sets 10 reps     Pt demo CGA sit<>stand with increased time to create momentum to stand. Pt demo CGA functional mobility with no device in her room, on the unit & throughout the OT clinic. Pt demo SBA to don/doff shoes with increased time. Pt demo SBA to stand @ high low table while engaged in a functional activity. Sensory / Neuromuscular Re-Education:      Cognitive Skills:   Status Comments   Problem   Solving fair  During ADL's, transfers & mobility   Memory fair + \"   Sequencing fair  \"   Safety fair  \"     Visual Perception:    Education:  Pt educated on hand placement during sit to stand transfers   Pt educated with regard to pacing & not ambulating too quickly to maintain her balance    [] Family teach completed on:    Pain Level: no report of pain    Additional Notes:   7/15/19: Pt reporting she had an assisted fall to the ground in the AM  7/23/19: dynamometer: R-29.7 L:17.7   9 hole peg: R: 1.06 L: 1.30  (table more elevated compared to eval, pt required assist to place peg into L hand by therapist)    Patient has made fair progress during treatment sessions toward set goals. Therapy emphasis to obtain goals:ADL retraining, transfer training, home management, endurance/balance retraining, therex, pt/family education      [x] Continue with current OT Plan of care.   [] Prepare for Discharge     DISCHARGE RECOMMENDATIONS  Recommended DME:  Rw, 3:1, tub bench, walker tray    Post Discharge Care:   []Home Independently  []Home with 24hr Care / Supervision []Home with Partial Supervision []Home with Home Health OT []Home with Out Pt OT []Other: ___   Comments:         Time in Time out Tx Time Breakdown  Variance:   First Session  9:15  10:00  [x] Individual Tx-45  [] Concurrent Tx -   [] Co-Tx -   [] Group Tx -   [] Time Missed -     Second Session [id number] [] Individual Tx-   [x] Concurrent Tx -  45  [] Co-Tx -   [] Group Tx -   [] Time Missed -      Third Session    [] Individual Tx-   [] Concurrent Tx -  [] Co-Tx -   [] Group Tx -   [] Time Missed -

## 2019-07-30 NOTE — PROGRESS NOTES
Progress Note    Subjective/   77y.o. year old female on the rehab unit for spinal cord injury. She denies new complaints. Nursing notes voiding better and having lower PVRs but still with large volumes. No dizziness. No chest pain or palpitations. No SOB. Objective/   VITALS:  BP (!) 120/56   Pulse 80   Temp 97.2 °F (36.2 °C)   Resp 20   Ht 5' 4\" (1.626 m)   Wt 187 lb 11.2 oz (85.1 kg)   SpO2 95%   BMI 32.22 kg/m²   24HR INTAKE/OUTPUT:      Intake/Output Summary (Last 24 hours) at 7/29/2019 2325  Last data filed at 7/29/2019 2217  Gross per 24 hour   Intake 960 ml   Output 2750 ml   Net -1790 ml     Constitutional:  Alert, awake, no apparent distress   Cardiovascular:  S1, S2 without m/r/g   Respiratory:  CTA B without w/r/r   Abdomen: +BS  Ext: no pitting LE edema  Neuro: improving balance and increased UE fine motor    Functional Level      Date   Status   AE    Comments     Feeding   7/28/19   Set up             Grooming   7/29/19    Min A        Pt completed oral care after set up . Pt able to comb front part of head. Assist to comb sides and back of head      Bathing   7/29/19    Max A        Bathing completed seated in shower. Bathing levels have remained at Max A due to being able to wash on chest and part of stomach due to limited AROM      UB Dressing   7/29/19    Mod A        Pt able to place BUE' s in sleeves and slide up to elbow. Assist to pull on over head and collar and to pull down and arrange      LB Dressing   7/29/19    Min A/Mod A    long handle shoe horn   Pt threaded depends and pants on with reacher. Assist to stand and pull up . Pt donned shoes with long shoe horn.  Pt needs Mod A to don socks    Homemaking   7/24/19    CGA   none              Functional Transfers / Balance:      Date Status DME  Comments   Sit Balance 7/29/19 SBA       Stand Balance 7/29/19 CGA       [x] Tub           [x] Shower   Transfer 7/25/19 7/29/19  CGA           Min A  No device extended tub bench  Grab rail and shower seat          Transfer in and out of walk in shower    Commode   Transfer     toileting  7/29/19 7/29/19              CGA        Max A  No device  Grab bar          Assist to pull pants up and down and to complete hygiene    Functional   Mobility 7/29/19 CGA No device In and out of bathroom    Other: sit to stand  Bed>w/c     Supine to EOB 7/28/19 7/24/19 7/24/19 Max  A     SBA     min assist          Functional Exercises / Activity:  MRMT to increase [address] and in hand manipulation of BUE's. Func FM ax to open pill bottle and place pills (candy) ack in the bottle to increase [address] and prehension of BUE's. Tabletop towel ex's to increase AROM and scapular mobilization of BUE's. 25 reps each concentrating on horizontal   Abd/adduction. Tabletop ax with incline board and wt'd box to increase AROM and strength of BUE's. 25 reps. Attempted internal/external rotation with orange theratube. Pt. Unable to complete effectively.      Sensory / Neuromuscular Re-Education:        Cognitive Skills:    Status Comments   Problem   Solving fair  During ADL's, transfers & mobility   Memory fair + \"   Sequencing fair  \"   Safety fair - \"          Scheduled Meds:   bethanechol  10 mg Oral TID    tamsulosin  0.4 mg Oral Daily    vitamin D  1,000 Units Oral Daily    docusate sodium  100 mg Oral BID    amLODIPine  5 mg Oral Daily    escitalopram  20 mg Oral Daily    fluticasone  1 spray Nasal Daily    therapeutic multivitamin-minerals  1 tablet Oral Daily    vitamin B-12  50 mcg Oral Daily     Continuous Infusions:  PRN Meds:polyvinyl alcohol, docusate sodium, oxyCODONE **OR** oxyCODONE, cyclobenzaprine, tiZANidine, acetaminophen, magnesium hydroxide  I/O last 3 completed shifts: In: 960 [P.O.:960]  Out: 2750 [Urine:2750]  No intake/output data recorded. Labs reviewed  CBC: No results for input(s): WBC, HGB, PLT in the last 72 hours.   BMP:  No results for input(s): NA, K, CL, CO2, BUN, CREATININE, GLUCOSE in the last 72 hours. Hepatic: No results for input(s): AST, ALT, ALB, BILITOT, ALKPHOS in the last 72 hours. BNP: No results for input(s): BNP in the last 72 hours. Lipids: No results for input(s): CHOL, HDL in the last 72 hours. Invalid input(s): LDLCALCU  INR: No results for input(s): INR in the last 72 hours. Assessment/  Patient Active Problem List:     Cervical stenosis of spinal canal     Essential hypertension     Paresthesia     Epidural hematoma (HCC)     Incomplete spinal cord injury at C1-C4 level without bone injury ([facility] [address]      Plan/  1. Continue rehab program with focus on increased strength and independence  2. Continue dvt prophylaxis  3. Continue collar   4. Continue bowel and bladder program  5.  Continue skin care          Patrick Sanchez MD

## 2019-07-30 NOTE — PROGRESS NOTES
)  · Usual Body Wt: (no wt hx per EMR )  · % Weight Change:  ,  CROW d/t no wt hx per EMR   · Ideal Body Wt: 120 lb (54.4 kg), % Ideal Body 156%  · BMI Classification: BMI 30.0 - 34.9 Obese Class I    Nutrition Interventions:   Continue current diet, Continue current ONS  Continued Inpatient Monitoring, Coordination of Care, No recommendations at this time    Nutrition Evaluation:   · Evaluation: Progressing toward goals   · Goals: consume 75% or more of most meals/ONS     · Monitoring: Supplement Intake, Meal Intake, Diet Tolerance, Skin Integrity, Wound Healing, I&O, Weight, Pertinent Labs, Monitor Bowel Function      Electronically signed by Jamar Dubois RD, LD on 7/30/19 at 4:18 PM    Contact Number: [phone]

## 2019-07-31 PROCEDURE — 97110 THERAPEUTIC EXERCISES: CPT

## 2019-07-31 PROCEDURE — 97530 THERAPEUTIC ACTIVITIES: CPT

## 2019-07-31 PROCEDURE — 97535 SELF CARE MNGMENT TRAINING: CPT

## 2019-07-31 PROCEDURE — 6370000000 HC RX 637 (ALT 250 FOR IP): Performed by: PHYSICAL MEDICINE & REHABILITATION

## 2019-07-31 PROCEDURE — 6370000000 HC RX 637 (ALT 250 FOR IP): Performed by: INTERNAL MEDICINE

## 2019-07-31 PROCEDURE — 1280000000 HC REHAB R&B

## 2019-07-31 RX ADMIN — BETHANECHOL CHLORIDE 10 MG: 5 TABLET ORAL at 09:24

## 2019-07-31 RX ADMIN — DOCUSATE SODIUM 100 MG: 100 CAPSULE, LIQUID FILLED ORAL at 20:28

## 2019-07-31 RX ADMIN — CYCLOBENZAPRINE 10 MG: 10 TABLET, FILM COATED ORAL at 20:29

## 2019-07-31 RX ADMIN — VITAMIN D, TAB 1000IU (100/BT) 1000 UNITS: 25 TAB at 09:24

## 2019-07-31 RX ADMIN — ESCITALOPRAM OXALATE 20 MG: 10 TABLET, FILM COATED ORAL at 09:24

## 2019-07-31 RX ADMIN — BETHANECHOL CHLORIDE 10 MG: 5 TABLET ORAL at 14:21

## 2019-07-31 RX ADMIN — BETHANECHOL CHLORIDE 10 MG: 5 TABLET ORAL at 20:28

## 2019-07-31 RX ADMIN — VITAM B12 50 MCG: 100 TAB at 09:23

## 2019-07-31 RX ADMIN — MULTIPLE VITAMINS W/ MINERALS TAB 1 TABLET: TAB at 09:24

## 2019-07-31 RX ADMIN — FLUTICASONE PROPIONATE 1 SPRAY: 50 SPRAY, METERED NASAL at 11:49

## 2019-07-31 RX ADMIN — DOCUSATE SODIUM 100 MG: 100 CAPSULE, LIQUID FILLED ORAL at 09:23

## 2019-07-31 RX ADMIN — TAMSULOSIN HYDROCHLORIDE 0.4 MG: 0.4 CAPSULE ORAL at 09:24

## 2019-07-31 RX ADMIN — AMLODIPINE BESYLATE 5 MG: 5 TABLET ORAL at 09:24

## 2019-07-31 ASSESSMENT — PAIN SCALES - GENERAL
PAINLEVEL_OUTOF10: 0
PAINLEVEL_OUTOF10: 0

## 2019-07-31 NOTE — PROGRESS NOTES
Physical Therapy    [address]  Daily Treatment Note    NAME: Reinaldo Noguera  : 1950  MRN: [id number]    Date of Service: 2019    Supervising Therapist: Jessie Valiente, PT, DPT    ROOM: [address] [place] [address]  DIAGNOSIS: incomplete spinal cord injury   PMH: To ED on 19 for numbness/tingling. s/p C3-6 PCF/laminectomy on 19 after fall before being discharged to SNF. S/P cervical wound exploration/evacuation of posterior cervical epidural hematoma on 7/3/19. PMH includes anxiety, depression, KRISTAL R LE, HTN    PRECAUTIONS: Cervical collar AAT, c-spine precautions, spinal neutral mechanics, falls     Social: Pt lives alone in a 2 floor plan 3-4 steps and 0 rails to enter. 12 steps upstairs with right HR. Prior to admission pt walked with no device but owns ww. Initial Evaluation  19 AM     PM   Short Term Goals Long Term Goals    Was pt agreeable to Eval/treatment? yes yes yes     Does pt have pain?  4/10 neck pain  none No c/o pain     Bed Mobility  Rolling: min/modA  Supine to sit: modA  Sit to supine: maxA  Scooting: modA Rolling SBA  Sit to supine SBA  Supine to sit CGA    See comments NT sup Mod I   Transfers Sit to stand: mod/maxA  Stand to sit: mod/maxA  Stand pivot: maxA with ww Sit<>Stand SBA from WC  Stand-pivot SBA without AD Sit<>stand SBA  Stand-pivot SBA without AD Jennifer with AAD  with AAD modified independent   Ambulation    50 feet with ww with Jennifer 400 feet x 2 reps without AD with SBA   400 feet x 2 reps without AD with close supervision >150 feet with AAD with sba >250 feet with AAD with  modified independent   Wheel Chair Mobility Nt NT NT  400 feet with available extremities with Modified Independent   Car Transfers NT NT SBA Jennifer Sup    Stair negotiation: ascended and descended NT 12 steps with 1 HR with CGA/SBA (descends back carnes)  12 steps with 1 HR with CGA/SBA     20 steps with 1 HR with CGA/SBA (stairwell)    All reps descend BW 4 steps with one rail with

## 2019-07-31 NOTE — PROGRESS NOTES
Occupational Therapy  OCCUPATIONAL THERAPY DAILY NOTE    Date:2019  Patient Name: Malinda Colindres  MRN: [id number]  : 1950  Room: [address] [place] [address]     DIAGNOSIS: ISCI  Pertinent History: To ED on 19 for numbness/tingling. s/p C3-6 PCF/laminectomy on 19 after fall before being discharged to SNF. S/P cervical wound exploration/evacuation of posterior cervical epidural hematoma on 7/3/19. PMH includes anxiety, depression, KRISTAL R LE, HTN  Precautions: Cervical collar AAT, c-spine precautions, spinal neutral mechanics, falls  Social History: Pt lives alone in a 2 floor plan 3-4 steps and 0 rails to enter. 12 steps upstairs with right HR. Prior to admission pt walked with no device but owns ww. Functional Assessment:   Date Status AE  Comments   Feeding 19 Mod I       Grooming 19  Min A      Bathing 19  Max A      UB Dressing 19  Mod A      LB Dressing 19  Min A  long handle shoe horn Don/doff pants and shoes. Assistance to pull pants over hips. Pt donned shoes with  shoehorn. Homemaking 19   set up (laundry)       Functional Transfers / Balance:   Date Status DME  Comments   Sit Balance 19 SBA     Stand Balance 19 SBA/CGA     [x] Tub        [x] Shower   Transfer 19  CGA        Min A  No device extended tub bench  Grab rail and shower seat    Commode   Transfer    toileting  19           CGA      Max A  No device  Grab bar    Functional   Mobility 19 SBA No device- gait belt  Pt room<> OT gym   Other: sit to stand from chair with arms      Bed mobility      19      CGA            Min A    No device                 Functional Exercises / Activity:  Angel box with 4# weight X 30 reps R and L and X 5 Mins forward/backward to increase ROM, strength and endurance for independence with functional tasks. Pipe tree design activity completed to increase B hand coordination and [facility].  Pt demo'd fair+

## 2019-08-01 PROCEDURE — 97530 THERAPEUTIC ACTIVITIES: CPT

## 2019-08-01 PROCEDURE — 1280000000 HC REHAB R&B

## 2019-08-01 PROCEDURE — 6370000000 HC RX 637 (ALT 250 FOR IP): Performed by: PHYSICAL MEDICINE & REHABILITATION

## 2019-08-01 PROCEDURE — 97535 SELF CARE MNGMENT TRAINING: CPT

## 2019-08-01 PROCEDURE — 6370000000 HC RX 637 (ALT 250 FOR IP): Performed by: INTERNAL MEDICINE

## 2019-08-01 RX ADMIN — VITAM B12 50 MCG: 100 TAB at 09:34

## 2019-08-01 RX ADMIN — ESCITALOPRAM OXALATE 20 MG: 10 TABLET, FILM COATED ORAL at 09:34

## 2019-08-01 RX ADMIN — AMLODIPINE BESYLATE 5 MG: 5 TABLET ORAL at 09:34

## 2019-08-01 RX ADMIN — FLUTICASONE PROPIONATE 1 SPRAY: 50 SPRAY, METERED NASAL at 09:40

## 2019-08-01 RX ADMIN — VITAMIN D, TAB 1000IU (100/BT) 1000 UNITS: 25 TAB at 09:34

## 2019-08-01 RX ADMIN — MULTIPLE VITAMINS W/ MINERALS TAB 1 TABLET: TAB at 09:34

## 2019-08-01 RX ADMIN — BETHANECHOL CHLORIDE 10 MG: 5 TABLET ORAL at 20:58

## 2019-08-01 RX ADMIN — DOCUSATE SODIUM 100 MG: 100 CAPSULE, LIQUID FILLED ORAL at 20:58

## 2019-08-01 RX ADMIN — BETHANECHOL CHLORIDE 10 MG: 5 TABLET ORAL at 14:08

## 2019-08-01 RX ADMIN — BETHANECHOL CHLORIDE 10 MG: 5 TABLET ORAL at 09:34

## 2019-08-01 RX ADMIN — TAMSULOSIN HYDROCHLORIDE 0.4 MG: 0.4 CAPSULE ORAL at 09:34

## 2019-08-01 RX ADMIN — DOCUSATE SODIUM 100 MG: 100 CAPSULE, LIQUID FILLED ORAL at 09:34

## 2019-08-01 ASSESSMENT — PAIN SCALES - GENERAL
PAINLEVEL_OUTOF10: 0

## 2019-08-01 NOTE — PROGRESS NOTES
Physical Therapy    Facility/Department: [address] [facility]HAB  Daily Treatment Note    NAME: Mike Rosen  : 1950  MRN: [id number]    Date of Service: 2019    Supervising Therapist: Kisha Walters, PT, DPT    ROOM: [address] [place] [address]  DIAGNOSIS: incomplete spinal cord injury   PMH: To ED on 19 for numbness/tingling. s/p C3-6 PCF/laminectomy on 19 after fall before being discharged to SNF. S/P cervical wound exploration/evacuation of posterior cervical epidural hematoma on 7/3/19. PMH includes anxiety, depression, KRISTAL R LE, HTN    PRECAUTIONS: Cervical collar AAT, c-spine precautions, spinal neutral mechanics, falls     Social: Pt lives alone in a 2 floor plan 3-4 steps and 0 rails to enter. 12 steps upstairs with right HR. Prior to admission pt walked with no device but owns ww. Initial Evaluation  19 AM     PM   Short Term Goals Long Term Goals    Was pt agreeable to Eval/treatment? yes yes yes     Does pt have pain?  4/10 neck pain  none No c/o pain     Bed Mobility  Rolling: min/modA  Supine to sit: modA  Sit to supine: maxA  Scooting: modA SBA all aspects (twin bed, no rails) NT sup Mod I   Transfers Sit to stand: mod/maxA  Stand to sit: mod/maxA  Stand pivot: maxA with ww Sit<>Stand SBA from WC, Jennifer from low armchair  Stand-pivot SBA without AD Sit<>stand SBA  Stand-pivot SBA without AD Jennifer with AAD  with AAD modified independent   Ambulation    50 feet with ww with Jennifer 400 feet x 3 reps without AD with SBA/close supervision   1000 feet x 3 reps and 500 feet x 4 reps without AD with SBA/CGA over even/uneven surfaces, inclines/declines, grass, rocks, mulch >150 feet with AAD with sba >250 feet with AAD with  modified independent   Wheel Chair Mobility Nt NT NT  400 feet with available extremities with Modified Independent   Car Transfers NT SBA SBA Jennifer Sup    Stair negotiation: ascended and descended NT 20 steps with 1 HR with CGA (descends back carnes)  10 platform steps with 1 HR outdoors x 1

## 2019-08-01 NOTE — PROGRESS NOTES
Functional Exercises / Activity:  Angel box with 4# weight X 30 reps R and L and X 5 Mins forward/backward to increase ROM, strength and endurance for independence with functional tasks.      Pipe tree design activity completed to increase B hand coordination and [address]. Pt demo'd fair+ skill throughout task.      tweezer dexterity as activity to increase B [address] for independence with ADLs and IADLs. Pt completed with fair skill occasionally presenting difficulty retrieving pieces off of table and from dish.      Mr. Mac with 1# weight to increase B hand coordination and strength for independence with functional tasks. Pt demo'd fair tolerance to exercise.     Sensory / Neuromuscular Re-Education:        Cognitive Skills:    Status Comments   Problem   Solving fair  During ADL's, transfers & mobility   Memory fair + \"   Sequencing fair  \"   Safety fair  \"          Scheduled Meds:   bethanechol  10 mg Oral TID    tamsulosin  0.4 mg Oral Daily    vitamin D  1,000 Units Oral Daily    docusate sodium  100 mg Oral BID    amLODIPine  5 mg Oral Daily    escitalopram  20 mg Oral Daily    fluticasone  1 spray Nasal Daily    therapeutic multivitamin-minerals  1 tablet Oral Daily    vitamin B-12  50 mcg Oral Daily     Continuous Infusions:  PRN Meds:polyvinyl alcohol, docusate sodium, oxyCODONE **OR** oxyCODONE, cyclobenzaprine, tiZANidine, acetaminophen, magnesium hydroxide  I/O last 3 completed shifts: In: 960 [P.O.:960]  Out: 750 [Urine:750]  No intake/output data recorded. Labs reviewed  CBC: No results for input(s): WBC, HGB, PLT in the last 72 hours. BMP:  No results for input(s): NA, K, CL, CO2, BUN, CREATININE, GLUCOSE in the last 72 hours. Hepatic: No results for input(s): AST, ALT, ALB, BILITOT, ALKPHOS in the last 72 hours. BNP: No results for input(s): BNP in the last 72 hours. Lipids: No results for input(s): CHOL, HDL in the last 72 hours.     Invalid input(s): LDLCALCU  INR: No results for input(s): INR in the last 72 hours. Assessment/  Patient Active Problem List:     Cervical stenosis of spinal canal     Essential hypertension     Paresthesia     Epidural hematoma (HCC)     Incomplete spinal cord injury at C1-C4 level without bone injury ([facility] [address]      Plan/  1. Continue rehab program  2. Increase activity as able  3. Continue focus on strengthening, mobility and independence  4. Continue dvt prophylaxis  5.  Continue bowel and bladder program          Pastor Gordo MD

## 2019-08-01 NOTE — PROGRESS NOTES
activity using hand to retrieve items from rice by feel to educate on compensatory techniques for bathing and dressing. Sensory / Neuromuscular Re-Education:      Cognitive Skills:   Status Comments   Problem   Solving fair  During ADL's, transfers & mobility   Memory fair + \"   Sequencing fair  \"   Safety fair  \"     Visual Perception:    Education:  Pt educated on techniques to increase independence with ADL's.     [] Family teach completed on:    Pain Level: no report of pain    Additional Notes:   7/15/19: Pt reporting she had an assisted fall to the ground in the AM  7/23/19: dynamometer: R-29.7 L:17.7   9 hole peg: R: 1.06 L: 1.30  (table more elevated compared to eval, pt required assist to place peg into L hand by therapist)    Patient has made fair progress during treatment sessions toward set goals. Therapy emphasis to obtain goals:ADL retraining, transfer training, home management, endurance/balance retraining, therex, pt/family education      [x] Continue with current OT Plan of care. [] Prepare for Discharge     DISCHARGE RECOMMENDATIONS  Recommended DME:  Rw, 3:1, tub bench, walker tray    Post Discharge Care:   []Home Independently  []Home with 24hr Care / Supervision []Home with Partial Supervision []Home with Home Health OT []Home with Out Pt OT []Other: ___   Comments:         Time in Time out Tx Time Breakdown  Variance:   First Session  8:30 10:00 [x] Individual Tx-90 mins  [] Concurrent Tx -  Mins  [] Co-Tx -   [] Group Tx -   [] Time Missed -     Second Session   [] Individual Tx-   [] Concurrent Tx -    [] Co-Tx -   [] Group Tx -   [] Time Missed -      Third Session    [] Individual Tx-   [] Concurrent Tx -  [] Co-Tx -   [] Group Tx -   [] Time Missed -         Total Tx Time: KAROLINE Hernandez/L [id number]      I have read & agree with the above status.     Rosa Seats OTR/L [id number]

## 2019-08-01 NOTE — PROGRESS NOTES
Physical Therapy    Facility/Department: [facility]  Weekly Team note    NAME: Andry Jernigan  : 1950  MRN: [id number]    Date of Service: 2019    Supervising Therapist: Lou Acevedo, PT, DPT    ROOM: [address] [place] [address]  DIAGNOSIS: incomplete spinal cord injury   PMH: To ED on 19 for numbness/tingling. s/p C3-6 PCF/laminectomy on 19 after fall before being discharged to SNF. S/P cervical wound exploration/evacuation of posterior cervical epidural hematoma on 7/3/19. PMH includes anxiety, depression, KRISTAL R LE, HTN    PRECAUTIONS: Cervical collar AAT, c-spine precautions, spinal neutral mechanics, falls     Social: Pt lives alone in a 2 floor plan 3-4 steps and 0 rails to enter. 12 steps upstairs with right HR. Prior to admission pt walked with no device but owns ww. Initial Evaluation  19 comments  Short Term Goals Long Term Goals    Was pt agreeable to Eval/treatment? yes yes yes yes yes      Does pt have pain?  4/10 neck pain  Pt c/o moderate neck pain Pt c/o generalized neck/back pain Moderate generalized neck pain Mild c/o neck pain      Bed Mobility  Rolling: min/modA  Supine to sit: modA  Sit to supine: maxA  Scooting: modA Rolling [place]  Supine to sit MaxA   Sit to supine MaxA  Scooting ModA Scooting Ash[place]  Supine>Sit ModA  Sit>Supine SBA  Rolling SBA  (mat table) SBA all aspects (hospital bed with rails) SBA all aspects (twin bed)  Pt no longer requires use of rails sup Mod I   Transfers Sit to stand: mod/maxA  Stand to sit: mod/maxA  Stand pivot: maxA with ww Sit<>Stand ModA  Stand-pivot ModA with [employer]    Sliding board transferMinA x1 reps from [employer] table  assitance for w/c management and parts Sit<>Stand ModA from WC, CGA/Ashwin from edge of mat table  Stand-pivot CGA/Ashwin without device Sit<>Stand CGA    Stand-pivot CGA without device Sit<>Stand SBA from WC  Stand-pivot SBA without device Pt requires assistance for anterior lean with stands from low

## 2019-08-02 PROCEDURE — 97530 THERAPEUTIC ACTIVITIES: CPT

## 2019-08-02 PROCEDURE — 6370000000 HC RX 637 (ALT 250 FOR IP): Performed by: INTERNAL MEDICINE

## 2019-08-02 PROCEDURE — 97535 SELF CARE MNGMENT TRAINING: CPT

## 2019-08-02 PROCEDURE — 6370000000 HC RX 637 (ALT 250 FOR IP): Performed by: PHYSICAL MEDICINE & REHABILITATION

## 2019-08-02 PROCEDURE — 1280000000 HC REHAB R&B

## 2019-08-02 PROCEDURE — 97110 THERAPEUTIC EXERCISES: CPT

## 2019-08-02 RX ADMIN — ESCITALOPRAM OXALATE 20 MG: 10 TABLET, FILM COATED ORAL at 08:19

## 2019-08-02 RX ADMIN — BETHANECHOL CHLORIDE 10 MG: 5 TABLET ORAL at 14:41

## 2019-08-02 RX ADMIN — BETHANECHOL CHLORIDE 10 MG: 5 TABLET ORAL at 08:18

## 2019-08-02 RX ADMIN — VITAM B12 50 MCG: 100 TAB at 08:19

## 2019-08-02 RX ADMIN — BETHANECHOL CHLORIDE 10 MG: 5 TABLET ORAL at 20:50

## 2019-08-02 RX ADMIN — TAMSULOSIN HYDROCHLORIDE 0.4 MG: 0.4 CAPSULE ORAL at 08:18

## 2019-08-02 RX ADMIN — FLUTICASONE PROPIONATE 1 SPRAY: 50 SPRAY, METERED NASAL at 08:24

## 2019-08-02 RX ADMIN — VITAMIN D, TAB 1000IU (100/BT) 1000 UNITS: 25 TAB at 08:19

## 2019-08-02 RX ADMIN — DOCUSATE SODIUM 100 MG: 100 CAPSULE, LIQUID FILLED ORAL at 20:50

## 2019-08-02 RX ADMIN — DOCUSATE SODIUM 100 MG: 100 CAPSULE, LIQUID FILLED ORAL at 08:18

## 2019-08-02 RX ADMIN — MULTIPLE VITAMINS W/ MINERALS TAB 1 TABLET: TAB at 08:19

## 2019-08-02 RX ADMIN — AMLODIPINE BESYLATE 5 MG: 5 TABLET ORAL at 08:19

## 2019-08-02 ASSESSMENT — PAIN SCALES - GENERAL
PAINLEVEL_OUTOF10: 0
PAINLEVEL_OUTOF10: 0

## 2019-08-02 NOTE — PATIENT CARE CONFERENCE
[address] [place] [address]  ACUTE REHABILITATION  TEAM CONFERENCE NOTE/PATIENT PLAN OF CARE    Date: 2019  Admission date: 2019  Patient Name: Lan Dyer        MRN: [id number]    : 1950  (67 y.o.)  Gender: female   Rehab diagnosis/surgery with date:  spinal cord injury incomplete at C1-C4  evacuation of cervical epidural hematoma 7/3/19, C3-6 PCF 19  Impairment Group Code:  [id number]      MEDICAL/FUNCTIONAL HISTORY/STATUS:  medically stable, continues to wear cervical collar    Consultations/Labs/X-rays: none recent      MEDICATION UPDATE:  no changes      NURSING FIMS:    Bowel:   Current level: Modified independent  Short term bowel goal:  Modified independent  Long term bowel goal: Modified independent    Bladder:   Current level: Modified independent  Short term bladder goal: Modified independent  Long term bladder goal: Modified independent    Toilet Hygiene:   Current level : max assist-lacks proprioception  Short term Toilet hygiene goal: mod assist  Long term toilet hygiene goal:  supervision    Skin integrity: healed incision  Pain: none    NUTRITION    Diet  general  Liquid consistency   thin    SOCIAL INFORMATION:  Lives with: alone  Prior community services:  none  Home Architecture:  2 story, 3 entry, no rails, 13 up to 2nd floor with 1 rail, may go to son's house at discharge  Prior Level of function:  independent  DME:  wheeled walker    FAMILY / PATIENT EDUCATION:  safety and self care are ongoing with patient    PHYSICAL THERAPY    Bed mobility:   Current level: standby assist  Short term bed mobility goal: supervision  Long term bed mobility goal: Modified independent    Chair/bed transfers:  Current level: standby assist  Short term Chair/bed transfers goal: standby assist  Long term Chair/bed transfers goal: Modified independent      Ambulation:   Current level: 400 ft no device at standby assist, still has balance deficits  Short term ambulation goal: met  Long term

## 2019-08-02 NOTE — PROGRESS NOTES
Modified Independent   Car Transfers NT SBA (Jennifer to buckle seatbelt) SBA (Jennifer to buckle seatbelt) Jennifer Sup    Stair negotiation: ascended and descended NT NT 10 platform steps with 1 HR outdoors x 1 rep with SBA/CGA (down FW)     16 steps with 1 HR with SBA/CGA (down FW) 4 steps with one rail with Jennifer >12 steps with one rail with sup   Curb Step:   ascended and descended NT NT 6 inch curb step x 4 reps with SBA 4 inch step with AAD and Jennifer 4 inch step with AAD and sup    BLE ROM WFL WNL         BLE Strength Bilateral LE: 4-/5 Bilateral LE: 4-/5         Balance  Sitting: sup  Standing: mod/maxA with ww Static and dynamic standing balance SBA/ close supervision without AD Static and dynamic standing balance SBA without AD             Scheduled Meds:   bethanechol  10 mg Oral TID    tamsulosin  0.4 mg Oral Daily    vitamin D  1,000 Units Oral Daily    docusate sodium  100 mg Oral BID    amLODIPine  5 mg Oral Daily    escitalopram  20 mg Oral Daily    fluticasone  1 spray Nasal Daily    therapeutic multivitamin-minerals  1 tablet Oral Daily    vitamin B-12  50 mcg Oral Daily     Continuous Infusions:  PRN Meds:polyvinyl alcohol, docusate sodium, oxyCODONE **OR** oxyCODONE, cyclobenzaprine, tiZANidine, acetaminophen, magnesium hydroxide  I/O last 3 completed shifts: In: 1560 [P.O.:1560]  Out: 4101 [Urine:3850]  No intake/output data recorded. Labs reviewed  CBC: No results for input(s): WBC, HGB, PLT in the last 72 hours. BMP:  No results for input(s): NA, K, CL, CO2, BUN, CREATININE, GLUCOSE in the last 72 hours. Hepatic: No results for input(s): AST, ALT, ALB, BILITOT, ALKPHOS in the last 72 hours. BNP: No results for input(s): BNP in the last 72 hours. Lipids: No results for input(s): CHOL, HDL in the last 72 hours. Invalid input(s): LDLCALCU  INR: No results for input(s): INR in the last 72 hours.     Assessment/  Patient Active Problem List:     Cervical stenosis of spinal canal     Essential

## 2019-08-02 NOTE — PLAN OF CARE
Problem: Pain:  Goal: Pain level will decrease  Description  Pain level will decrease  8/2/2019 0748 by Lexii Everett RN  Outcome: Met This Shift  8/1/2019 2030 by Maureen Covington RN  Outcome: Met This Shift  8/1/2019 1758 by Radha Marie RN  Outcome: Met This Shift     Problem: Infection - Surgical Site:  Goal: Will show no infection signs and symptoms  Description  Will show no infection signs and symptoms  8/2/2019 0748 by Lexii Everett RN  Outcome: Met This Shift  8/1/2019 2030 by Maureen Covington RN  Outcome: Met This Shift  8/1/2019 1758 by Radha Marie RN  Outcome: Met This Shift

## 2019-08-02 NOTE — PROGRESS NOTES
descended NT NT 10 platform steps with 1 HR outdoors x 1 rep with SBA/CGA (down FW)    16 steps with 1 HR with SBA/CGA (down FW) 4 steps with one rail with Jennifer >12 steps with one rail with sup   Curb Step:   ascended and descended NT NT 6 inch curb step x 4 reps with SBA 4 inch step with AAD and Jennifer 4 inch step with AAD and sup    BLE ROM WFL WNL      BLE Strength Bilateral LE: 4-/5 Bilateral LE: 4-/5      Balance  Sitting: sup  Standing: mod/maxA with ww Static and dynamic standing balance SBA/ close supervision without AD Static and dynamic standing balance SBA without AD     Date Family Teach Completed TBA       Is additional Family Teaching Needed? Y or N Y pending improvement Y      Hindering Progress Safety awareness, clonus/flexor withdrawal L LE, coordination Balance/ coordination and sensory deficits      PT recommended ELOS 4-5 weeks       Team's Discharge Plan        Therapist at Team Meeting          Therapeutic Exercise:   AM: Opening locked door with key x 1 rep  Apartment mobility ambulating to/from various chairs with/without armrests, recliners, couch x 5 reps with SBA  Reaching for cone stacked on ground followed by transfer to contralateral hand and stacking onto therapist hand x 12 reps L and R  PM: FW/BW stepping over quad cane on floor emphasis on slow controlled motion without UE support x 10 reps L and R    Patient education  Pt educated on JAMES relation to dynamic standing balance    Patient response to education:   Pt verbalized understanding Pt demonstrated skill Pt requires further education in this area   yes partial yes     Additional Comments: Pt exhibits improved technique with sit<>Stand transfer from low surfaces including all surfaces in apartment. Multiple attempts required to stand from low chairs with verbal cueing to increase anterior lean. Pt exhibits no LOB with reaching to floor and stacking cone activity. Pt still occasionally becomes distracted leading to LOB.  Outdoor activity progressed to more steep inclines on grass and more prolonged ambulation over inclines/declines. Pt able to progress stair training to forward descent with cues for anterior lean to prevent posterior LOB. AM  Time in: 1045  Time out: 1130    PM  Time in: 1515  Time out: 1600    Pt is making good progress toward established Physical Therapy goals. Continue with physical therapy current plan of care.     Mary Villa, PT, DPT  [phone]

## 2019-08-03 PROCEDURE — 6370000000 HC RX 637 (ALT 250 FOR IP): Performed by: PHYSICAL MEDICINE & REHABILITATION

## 2019-08-03 PROCEDURE — 51798 US URINE CAPACITY MEASURE: CPT

## 2019-08-03 PROCEDURE — 97530 THERAPEUTIC ACTIVITIES: CPT

## 2019-08-03 PROCEDURE — 6370000000 HC RX 637 (ALT 250 FOR IP): Performed by: INTERNAL MEDICINE

## 2019-08-03 PROCEDURE — 1280000000 HC REHAB R&B

## 2019-08-03 RX ORDER — TIZANIDINE 4 MG/1
4 TABLET ORAL NIGHTLY PRN
Status: DISCONTINUED | OUTPATIENT
Start: 2019-08-03 | End: 2019-08-03

## 2019-08-03 RX ORDER — CYCLOBENZAPRINE HCL 10 MG
10 TABLET ORAL NIGHTLY PRN
Status: DISCONTINUED | OUTPATIENT
Start: 2019-08-03 | End: 2019-08-09 | Stop reason: HOSPADM

## 2019-08-03 RX ORDER — ACETAMINOPHEN 325 MG/1
650 TABLET ORAL 3 TIMES DAILY PRN
Status: DISCONTINUED | OUTPATIENT
Start: 2019-08-03 | End: 2019-08-09 | Stop reason: HOSPADM

## 2019-08-03 RX ORDER — OXYCODONE HYDROCHLORIDE 5 MG/1
5 TABLET ORAL DAILY PRN
Status: DISCONTINUED | OUTPATIENT
Start: 2019-08-03 | End: 2019-08-09 | Stop reason: HOSPADM

## 2019-08-03 RX ADMIN — ESCITALOPRAM OXALATE 20 MG: 10 TABLET, FILM COATED ORAL at 09:25

## 2019-08-03 RX ADMIN — AMLODIPINE BESYLATE 5 MG: 5 TABLET ORAL at 09:25

## 2019-08-03 RX ADMIN — DOCUSATE SODIUM 100 MG: 100 CAPSULE, LIQUID FILLED ORAL at 09:24

## 2019-08-03 RX ADMIN — MULTIPLE VITAMINS W/ MINERALS TAB 1 TABLET: TAB at 09:25

## 2019-08-03 RX ADMIN — VITAM B12 50 MCG: 100 TAB at 09:24

## 2019-08-03 RX ADMIN — TAMSULOSIN HYDROCHLORIDE 0.4 MG: 0.4 CAPSULE ORAL at 09:27

## 2019-08-03 RX ADMIN — BETHANECHOL CHLORIDE 10 MG: 5 TABLET ORAL at 13:19

## 2019-08-03 RX ADMIN — BETHANECHOL CHLORIDE 10 MG: 5 TABLET ORAL at 09:24

## 2019-08-03 RX ADMIN — BETHANECHOL CHLORIDE 10 MG: 5 TABLET ORAL at 21:01

## 2019-08-03 RX ADMIN — FLUTICASONE PROPIONATE 1 SPRAY: 50 SPRAY, METERED NASAL at 09:24

## 2019-08-03 RX ADMIN — VITAMIN D, TAB 1000IU (100/BT) 1000 UNITS: 25 TAB at 09:25

## 2019-08-03 ASSESSMENT — PAIN SCALES - GENERAL: PAINLEVEL_OUTOF10: 0

## 2019-08-04 LAB
ALBUMIN SERPL-MCNC: 3.7 G/DL (ref 3.5–5.2)
ALP BLD-CCNC: 125 U/L (ref 35–104)
ALT SERPL-CCNC: 16 U/L (ref 0–32)
ANION GAP SERPL CALCULATED.3IONS-SCNC: 12 MMOL/L (ref 7–16)
AST SERPL-CCNC: 14 U/L (ref 0–31)
BILIRUB SERPL-MCNC: 0.3 MG/DL (ref 0–1.2)
BUN BLDV-MCNC: 22 MG/DL (ref 8–23)
CALCIUM SERPL-MCNC: 9.6 MG/DL (ref 8.6–10.2)
CHLORIDE BLD-SCNC: 102 MMOL/L (ref 98–107)
CO2: 27 MMOL/L (ref 22–29)
CREAT SERPL-MCNC: 0.6 MG/DL (ref 0.5–1)
GFR AFRICAN AMERICAN: >60
GFR NON-AFRICAN AMERICAN: >60 ML/MIN/1.73
GLUCOSE BLD-MCNC: 110 MG/DL (ref 74–99)
HCT VFR BLD CALC: 36.7 % (ref 34–48)
HEMOGLOBIN: 11.4 G/DL (ref 11.5–15.5)
MCH RBC QN AUTO: 28.2 PG (ref 26–35)
MCHC RBC AUTO-ENTMCNC: 31.1 % (ref 32–34.5)
MCV RBC AUTO: 90.8 FL (ref 80–99.9)
PDW BLD-RTO: 15 FL (ref 11.5–15)
PLATELET # BLD: 238 E9/L (ref 130–450)
PMV BLD AUTO: 9.1 FL (ref 7–12)
POTASSIUM SERPL-SCNC: 3.9 MMOL/L (ref 3.5–5)
RBC # BLD: 4.04 E12/L (ref 3.5–5.5)
SODIUM BLD-SCNC: 141 MMOL/L (ref 132–146)
TOTAL PROTEIN: 6.5 G/DL (ref 6.4–8.3)
WBC # BLD: 5.6 E9/L (ref 4.5–11.5)

## 2019-08-04 PROCEDURE — 97530 THERAPEUTIC ACTIVITIES: CPT

## 2019-08-04 PROCEDURE — 97112 NEUROMUSCULAR REEDUCATION: CPT

## 2019-08-04 PROCEDURE — 6370000000 HC RX 637 (ALT 250 FOR IP): Performed by: PHYSICAL MEDICINE & REHABILITATION

## 2019-08-04 PROCEDURE — 85027 COMPLETE CBC AUTOMATED: CPT

## 2019-08-04 PROCEDURE — 1280000000 HC REHAB R&B

## 2019-08-04 PROCEDURE — 6370000000 HC RX 637 (ALT 250 FOR IP): Performed by: INTERNAL MEDICINE

## 2019-08-04 PROCEDURE — 36415 COLL VENOUS BLD VENIPUNCTURE: CPT

## 2019-08-04 PROCEDURE — 80053 COMPREHEN METABOLIC PANEL: CPT

## 2019-08-04 PROCEDURE — 97535 SELF CARE MNGMENT TRAINING: CPT

## 2019-08-04 PROCEDURE — 51798 US URINE CAPACITY MEASURE: CPT

## 2019-08-04 RX ADMIN — VITAMIN D, TAB 1000IU (100/BT) 1000 UNITS: 25 TAB at 08:33

## 2019-08-04 RX ADMIN — BETHANECHOL CHLORIDE 10 MG: 5 TABLET ORAL at 08:33

## 2019-08-04 RX ADMIN — AMLODIPINE BESYLATE 5 MG: 5 TABLET ORAL at 08:34

## 2019-08-04 RX ADMIN — DOCUSATE SODIUM 100 MG: 100 CAPSULE, LIQUID FILLED ORAL at 20:52

## 2019-08-04 RX ADMIN — BETHANECHOL CHLORIDE 10 MG: 5 TABLET ORAL at 13:42

## 2019-08-04 RX ADMIN — VITAM B12 50 MCG: 100 TAB at 08:33

## 2019-08-04 RX ADMIN — ESCITALOPRAM OXALATE 20 MG: 10 TABLET, FILM COATED ORAL at 08:34

## 2019-08-04 RX ADMIN — MULTIPLE VITAMINS W/ MINERALS TAB 1 TABLET: TAB at 08:34

## 2019-08-04 RX ADMIN — ACETAMINOPHEN 650 MG: 325 TABLET, FILM COATED ORAL at 08:33

## 2019-08-04 RX ADMIN — ACETAMINOPHEN 650 MG: 325 TABLET, FILM COATED ORAL at 22:30

## 2019-08-04 RX ADMIN — BETHANECHOL CHLORIDE 10 MG: 5 TABLET ORAL at 20:51

## 2019-08-04 ASSESSMENT — PAIN SCALES - GENERAL
PAINLEVEL_OUTOF10: 0
PAINLEVEL_OUTOF10: 0
PAINLEVEL_OUTOF10: 5
PAINLEVEL_OUTOF10: 7
PAINLEVEL_OUTOF10: 0
PAINLEVEL_OUTOF10: 7

## 2019-08-04 ASSESSMENT — PAIN DESCRIPTION - PAIN TYPE
TYPE: CHRONIC PAIN
TYPE: CHRONIC PAIN

## 2019-08-04 ASSESSMENT — PAIN DESCRIPTION - DESCRIPTORS
DESCRIPTORS: THROBBING
DESCRIPTORS: ACHING

## 2019-08-04 ASSESSMENT — PAIN DESCRIPTION - LOCATION
LOCATION: SHOULDER
LOCATION: SHOULDER

## 2019-08-04 ASSESSMENT — PAIN DESCRIPTION - ORIENTATION
ORIENTATION: RIGHT;LEFT
ORIENTATION: RIGHT;LEFT

## 2019-08-05 LAB
FUNGUS (MYCOLOGY) CULTURE: NORMAL
FUNGUS STAIN: NORMAL

## 2019-08-05 PROCEDURE — 97530 THERAPEUTIC ACTIVITIES: CPT

## 2019-08-05 PROCEDURE — 97112 NEUROMUSCULAR REEDUCATION: CPT

## 2019-08-05 PROCEDURE — 6370000000 HC RX 637 (ALT 250 FOR IP): Performed by: INTERNAL MEDICINE

## 2019-08-05 PROCEDURE — 6370000000 HC RX 637 (ALT 250 FOR IP): Performed by: PHYSICAL MEDICINE & REHABILITATION

## 2019-08-05 PROCEDURE — 97535 SELF CARE MNGMENT TRAINING: CPT

## 2019-08-05 PROCEDURE — 1280000000 HC REHAB R&B

## 2019-08-05 PROCEDURE — 97110 THERAPEUTIC EXERCISES: CPT

## 2019-08-05 PROCEDURE — 97127 HC OT THER IVNTJ W/FOCUS COG FUNCJ: CPT

## 2019-08-05 RX ADMIN — VITAMIN D, TAB 1000IU (100/BT) 1000 UNITS: 25 TAB at 08:29

## 2019-08-05 RX ADMIN — ESCITALOPRAM OXALATE 20 MG: 10 TABLET, FILM COATED ORAL at 08:29

## 2019-08-05 RX ADMIN — AMLODIPINE BESYLATE 5 MG: 5 TABLET ORAL at 08:29

## 2019-08-05 RX ADMIN — MULTIPLE VITAMINS W/ MINERALS TAB 1 TABLET: TAB at 08:29

## 2019-08-05 RX ADMIN — VITAM B12 50 MCG: 100 TAB at 08:29

## 2019-08-05 ASSESSMENT — PAIN SCALES - GENERAL
PAINLEVEL_OUTOF10: 0

## 2019-08-05 NOTE — FLOWSHEET NOTE
08/04/19 2130   Output (mL)   Urine 200 mL   Urine Assessment   Incontinence No   Urine Color Yellow/straw   Urine Appearance Clear   $ Bladder scan $ Yes   Post Void Cath Residual (mL) 89

## 2019-08-05 NOTE — PROGRESS NOTES
Physical Therapy    Facility/Department: [facility]  Daily Treatment Note    NAME: Lan Dyer  : 1950  MRN: [id number]    Date of Service: 2019    Supervising Therapist: Patricia Ramirez PT, DPT    ROOM: [address] [place] [address]  DIAGNOSIS: incomplete spinal cord injury   PMH: To ED on 19 for numbness/tingling. s/p C3-6 PCF/laminectomy on 19 after fall before being discharged to SNF. S/P cervical wound exploration/evacuation of posterior cervical epidural hematoma on 7/3/19. PMH includes anxiety, depression, KRISTAL R LE, HTN    PRECAUTIONS: Cervical collar AAT, c-spine precautions, spinal neutral mechanics, falls     Social: Pt lives alone in a 2 floor plan 3-4 steps and 0 rails to enter. 12 steps upstairs with right HR. Prior to admission pt walked with no device but owns ww. Initial Evaluation  19 AM     PM   Short Term Goals Long Term Goals    Was pt agreeable to Eval/treatment? yes yes yes     Does pt have pain?  4/10 neck pain  none No c/o pain     Bed Mobility  Rolling: min/modA  Supine to sit: modA  Sit to supine: maxA  Scooting: modA Supervision all aspects (twin bed, no rails) NT sup Mod I   Transfers Sit to stand: mod/maxA  Stand to sit: mod/maxA  Stand pivot: maxA with ww Sit<>Stand SBA from all surfaces  Stand-pivot SBA without AD Sit<>stand Supervision    Stand-pivot close supervision Jennifer with AAD  with AAD modified independent   Ambulation    50 feet with ww with Jennifer 500 feet x 2 reps without AD with SBA/close supervision, 1 LOB requiring Jennifer to correct    500 feet x 2 reps without AD with Supervision    1000 feet x 3 reps and  without AD with SBA/CGA over even/uneven surfaces, various grades of inclines/declines, grass, rocks, mulch >150 feet with AAD with sba >250 feet with AAD with  modified independent   Wheel Chair Mobility Nt NT NT  400 feet with available extremities with Modified Independent   Car Transfers NT SBA (Jennifer to buckle seatbelt) NT Jennifer Sup    Stair negotiation: ascended and descended NT 20 steps with 1 HR with SBA (down FW) 10 platform steps without HR ascending, R HR descending outdoors x 1 rep with SBA/CGA (down FW)     4 steps with one rail with Jennifer >12 steps with one rail with sup   Curb Step:   ascended and descended NT 7.5 inch curb step without AD with SBA 6 inch curb step x 4 reps with SBA 4 inch step with AAD and Jennifer 4 inch step with AAD and sup    BLE ROM WFL WNL      BLE Strength Bilateral LE: 4-/5 Bilateral LE: 4-/5      Balance  Sitting: sup  Standing: mod/maxA with ww Static and dynamic standing balance SBA/ close supervision without AD Static and dynamic standing balance SBA without AD     Date Family Teach Completed TBA       Is additional Family Teaching Needed?   Y or N Y pending improvement Y      Hindering Progress Safety awareness, clonus/flexor withdrawal L LE, coordination Balance/ coordination and sensory deficits      PT recommended ELOS 4-5 weeks       Team's Discharge Plan        Therapist at Team Meeting          Therapeutic Exercise:   AM: Picking up 10 cones from floor, transferring to contralateral hand and stacking on therapist hand x 1 rep L and R  FW/lateral agility ladder negotiation without AD with SBA/CGA  BW/lateral agility ladder negotiation without AD with SBA/CGA  FW/BW/lateral agility ladder negotiation without AD x 1 rep L and R with SBA/CGA  FW/FW/BW/BW lateral agility ladder negotiation without AD x 1 rep L and R with SBA/CGA  Braiding without AD  35 feet x 1 rep L and R with Jennifer  Sit<>Stand transfer x 10 reps from low armchair with SBA  PM:   Braiding 40 feet x 3 reps L and R without UE support with CGA/Jennifer    Patient education  Pt educated on JAMES relation to dynamic standing balance with stair negotaition     Patient response to education:   Pt verbalized understanding Pt demonstrated skill Pt requires further education in this area   yes partial yes     Additional Comments: Pt exhibits significantly improving dynamic

## 2019-08-05 NOTE — PROGRESS NOTES
Progress Note    Subjective/   77y.o. year old female on the rehab unit for spinal cord injury. No SOB or chest pain. Tolerating therapy program. No change in her bowel or bladder. No dizziness. No change in swallowing. Objective/   VITALS:  /62   Pulse 72   Temp 97.9 °F (36.6 °C)   Resp 14   Ht 5' 4\" (1.626 m)   Wt 187 lb 11.2 oz (85.1 kg)   SpO2 98%   BMI 32.22 kg/m²   24HR INTAKE/OUTPUT:      Intake/Output Summary (Last 24 hours) at 8/5/2019 1647  Last data filed at 8/5/2019 1515  Gross per 24 hour   Intake 180 ml   Output 2539 ml   Net -2359 ml     Constitutional:  Alert, awake, no apparent distress   Cardiovascular:  S1, S2 without m/r/g   Respiratory:  CTA B without w/r/r   Abdomen: +BS  Ext: no pitting LE edema  Neuro: awake and alert, UE >> LE weakness with good improvement but still significant weakness    Functional Level    Initial Evaluation  7/7/19 AM     PM   Short Term Goals Long Term Goals    Was pt agreeable to Eval/treatment? yes yes yes       Does pt have pain?  4/10 neck pain  none No c/o pain       Bed Mobility  Rolling: min/modA  Supine to sit: modA  Sit to supine: maxA  Scooting: modA Supervision all aspects (twin bed, no rails) NT sup Mod I   Transfers Sit to stand: mod/maxA  Stand to sit: mod/maxA  Stand pivot: maxA with ww Sit<>Stand SBA from all surfaces  Stand-pivot SBA without AD Sit<>stand Supervision    Stand-pivot close supervision Jennifer with AAD  with AAD modified independent   Ambulation    50 feet with ww with Jennifer 500 feet x 2 reps without AD with SBA/close supervision, 1 LOB requiring Jennifer to correct     500 feet x 2 reps without AD with Supervision     1000 feet x 3 reps and  without AD with SBA/CGA over even/uneven surfaces, various grades of inclines/declines, grass, rocks, mulch >150 feet with AAD with sba >250 feet with AAD with  modified independent   Wheel Chair Mobility Nt NT NT   400 feet with available extremities with Modified Independent hematoma ([facility] [address]     Incomplete spinal cord injury at C1-C4 level without bone injury ([address]      Plan/  1. Continue rehab program  2. Increase activity as able  3. Continue medications  4. Continue dvt prophylaxis  5.  Neurosurgery note appreciated           Tamia Dorantes MD

## 2019-08-05 NOTE — PROGRESS NOTES
when pt is fatigued or sits on a lower surface. Pt demo SBA-CGA to ambulate with no device in her rooom, on the unit & throughout the OT gym. Pt require vc's for pacing & safety when up & moving. Pt demo SBA to stand @ high low table while engaged in a functional activity. Pt william improved FMC/prehension but continues to be hindered with [facility] due to sensory deficits in her hands. Pt william improved tripod grasp using her R hand & placing large pegs & small pegs       Sensory / Neuromuscular Re-Education:      Cognitive Skills:   Status Comments   Problem   Solving fair  During activities and ADLs. Memory fair + \"   Sequencing fair  \"   Safety fair  \"     Visual Perception:    Education:  Pt educated on techniques to increase independence with ADL's. Pt educated with regards to energy conservation & pacing strategies to use during mobility & transfers. [] Family teach completed on:    Pain Level: no report of pain    Additional Notes:   7/15/19: Pt reporting she had an assisted fall to the ground in the AM  7/23/19: dynamometer: R-29.7 L:17.7   9 hole peg: R: 1.06 L: 1.30  (table more elevated compared to eval, pt required assist to place peg into L hand by therapist)  8/2/19: Pt c/o fatigue and demonstrates fair balance and required CGA-Min A for recovery during X 2 LOB. Patient has made fair progress during treatment sessions toward set goals. Therapy emphasis to obtain goals:ADL retraining, transfer training, home management, endurance/balance retraining, therex, pt/family education      [x] Continue with current OT Plan of care.   [] Prepare for Discharge     DISCHARGE RECOMMENDATIONS  Recommended DME:  Rw, 3:1, tub bench, walker tray    Post Discharge Care:   []Home Independently  []Home with 24hr Care / Supervision []Home with Partial Supervision []Home with Home Health OT []Home with Out Pt OT []Other: ___   Comments:         Time in Time out Tx Time Breakdown  Variance:   First Session  [phone] [x]

## 2019-08-06 PROCEDURE — 6370000000 HC RX 637 (ALT 250 FOR IP): Performed by: PHYSICAL MEDICINE & REHABILITATION

## 2019-08-06 PROCEDURE — 97535 SELF CARE MNGMENT TRAINING: CPT

## 2019-08-06 PROCEDURE — 97110 THERAPEUTIC EXERCISES: CPT

## 2019-08-06 PROCEDURE — 6370000000 HC RX 637 (ALT 250 FOR IP): Performed by: INTERNAL MEDICINE

## 2019-08-06 PROCEDURE — 51798 US URINE CAPACITY MEASURE: CPT

## 2019-08-06 PROCEDURE — 97530 THERAPEUTIC ACTIVITIES: CPT

## 2019-08-06 PROCEDURE — 1280000000 HC REHAB R&B

## 2019-08-06 RX ADMIN — ESCITALOPRAM OXALATE 20 MG: 10 TABLET, FILM COATED ORAL at 09:09

## 2019-08-06 RX ADMIN — VITAM B12 50 MCG: 100 TAB at 09:09

## 2019-08-06 RX ADMIN — DOCUSATE SODIUM 100 MG: 100 CAPSULE, LIQUID FILLED ORAL at 20:32

## 2019-08-06 RX ADMIN — AMLODIPINE BESYLATE 5 MG: 5 TABLET ORAL at 09:09

## 2019-08-06 RX ADMIN — CYCLOBENZAPRINE 10 MG: 10 TABLET, FILM COATED ORAL at 20:33

## 2019-08-06 RX ADMIN — VITAMIN D, TAB 1000IU (100/BT) 1000 UNITS: 25 TAB at 09:08

## 2019-08-06 RX ADMIN — ACETAMINOPHEN 650 MG: 325 TABLET, FILM COATED ORAL at 09:09

## 2019-08-06 RX ADMIN — MULTIPLE VITAMINS W/ MINERALS TAB 1 TABLET: TAB at 09:08

## 2019-08-06 ASSESSMENT — PAIN SCALES - GENERAL
PAINLEVEL_OUTOF10: 1
PAINLEVEL_OUTOF10: 3
PAINLEVEL_OUTOF10: 0
PAINLEVEL_OUTOF10: 0

## 2019-08-06 NOTE — PROGRESS NOTES
Physical Therapy    Facility/Department: [address] [facility]  Daily Treatment Note    NAME: Irais Loomis  : 1950  MRN: [id number]    Date of Service: 2019    Supervising Therapist: Mohinder Stockton PT, DPT    ROOM: [address] [place], [place] [id number]  DIAGNOSIS: incomplete spinal cord injury   PMH: To ED on 19 for numbness/tingling. s/p C3-6 PCF/laminectomy on 19 after fall before being discharged to SNF. S/P cervical wound exploration/evacuation of posterior cervical epidural hematoma on 7/3/19. PMH includes anxiety, depression, KRISTAL R LE, HTN    PRECAUTIONS: Cervical collar AAT, c-spine precautions, spinal neutral mechanics, falls     Social: Pt lives alone in a 2 floor plan 3-4 steps and 0 rails to enter. 12 steps upstairs with right HR. Prior to admission pt walked with no device but owns ww. Initial Evaluation  19 AM     PM   Short Term Goals Long Term Goals    Was pt agreeable to Eval/treatment? yes yes yes     Does pt have pain?  4/10 neck pain  none No c/o pain     Bed Mobility  Rolling: min/modA  Supine to sit: modA  Sit to supine: maxA  Scooting: modA NT NT sup Mod I   Transfers Sit to stand: mod/maxA  Stand to sit: mod/maxA  Stand pivot: maxA with ww Sit<>Stand Supervision  Stand-pivot close supervision without AD Sit<>stand Supervision    Stand-pivot close supervision Jennifer with AAD  with AAD modified independent   Ambulation    50 feet with ww with Jennifer >3000 feet outdoors over even/uneven surfaces and various inclines/declines of grass/concrete    500 feet x 2 reps without AD with Supervision   >150 feet with AAD with sba >250 feet with AAD with  modified independent   Wheel Chair Mobility Nt NT NT  400 feet with available extremities with Modified Independent   Car Transfers NT NT Supervision Jennifer Sup    Stair negotiation: ascended and descended NT NT 20 steps with 1 HR with SBA (down FW)     4 steps with one rail with Jennifer >12 steps with one rail with sup   Curb Step:   ascended and descended NT 6 inch this area   yes partial yes     Additional Comments: Pt making significant progress with dynamic standing balance tasks, able to ambulate significant distance with only mild c/o fatigue. Long distance ambulation repetition completed outdoors without LOB during AM session. Pt still having mild difficulty with L weight shifting however still exhibits improving control with single leg stance activity. Plan to progress dynamic standing balance activities with emphasis on reduced JAMES. AM  Time in: 1045  Time out: 1130    PM  Time in: 1515  Time out: 1600    Pt is making good progress toward established Physical Therapy goals. Continue with physical therapy current plan of care.     Vimal Yan, PT, DPT  [phone]

## 2019-08-06 NOTE — PROGRESS NOTES
floor level to increase competency AE for improved independence with ADL tasks LE dressing. Angel box with 2-4# weight to increase BUE strength, ROM and endurance for independence with functional tasks. [address] completed using rice bin and spoon. Pt retrieved small objects from rice and sifted objects out with L hand and then placed pieces back into rice bin with R hand and demo'd fair+ skill completing task. Sensory / Neuromuscular Re-Education:      Cognitive Skills:   Status Comments   Problem   Solving fair  During activities and ADLs. Memory fair + \"   Sequencing fair  \"   Safety fair  \"     Visual Perception:    Education:  Pt educated on techniques to increase independence with ADL's. Pt educated with regards to energy conservation & pacing strategies to use during mobility & transfers. [] Family teach completed on:    Pain Level: no report of pain    Additional Notes:   7/15/19: Pt reporting she had an assisted fall to the ground in the AM  7/23/19: dynamometer: R-29.7 L:17.7   9 hole peg: R: 1.06 L: 1.30  (table more elevated compared to eval, pt required assist to place peg into L hand by therapist)  8/2/19: Pt c/o fatigue and demonstrates fair balance and required CGA-Min A for recovery during X 2 LOB. Patient has made fair progress during treatment sessions toward set goals. Therapy emphasis to obtain goals:ADL retraining, transfer training, home management, endurance/balance retraining, therex, pt/family education      [x] Continue with current OT Plan of care.   [] Prepare for Discharge     DISCHARGE RECOMMENDATIONS  Recommended DME:  Rw, 3:1, tub bench, walker tray    Post Discharge Care:   []Home Independently  []Home with 24hr Care / Supervision []Home with Partial Supervision []Home with Home Health OT []Home with Out Pt OT []Other: ___   Comments:         Time in Time out Tx Time Breakdown  Variance:   First Session   0915 1000 [x] Individual Tx- 45 mins  [] Concurrent Tx-  [] Co-Tx -   [] Group Tx -   [] Time Missed -     Second Session 1:45 2:30 [x] Individual Tx-  45 Mins  [] Concurrent Tx -    [] Co-Tx -   [] Group Tx -   [] Time Missed -      Third Session    [] Individual Tx-   [] Concurrent Tx -  [] Co-Tx -   [] Group Tx -   [] Time Missed -         Total Tx Time: 90  Mins   Brian Portal  RAMEY/L [address] RAMEY/L [id number]      I have read & agree with the above status.     Ruma Asencio OTR/L [id number]

## 2019-08-06 NOTE — PROGRESS NOTES
Nutrition Assessment    Type and Reason for Visit: Reassess    Nutrition Recommendations: Continue current diet and ONS as ordered. Will continue to monitor and follow. Nutrition Assessment: Sravanthi continues w/ stable nutritional status w/ continued PO intakes % of meals/ONS since admit. Continues at risk d/t increase nutrient needs for healing 2/2 wounds. Continue current diet and ONS as ordered     Malnutrition Assessment:  · Malnutrition Status: No malnutrition  · Context: Acute illness or injury  · Findings of the 6 clinical characteristics of malnutrition (Minimum of 2 out of 6 clinical characteristics is required to make the diagnosis of moderate or severe Protein Calorie Malnutrition based on AND/ASPEN Guidelines):  1. Energy Intake-Greater than 75% of estimated energy requirement, Greater than or equal to 7 days    2. Weight Loss-No significant weight loss,    3. Fat Loss-No significant subcutaneous fat loss,    4. Muscle Loss-No significant muscle mass loss,    5. Fluid Accumulation-No significant fluid accumulation,    6.   Strength-Not measured    Nutrition Risk Level: Low    Nutrient Needs:  · Estimated Daily Total Kcal: 1141-0564(MSJ: 1380 x 1.2 )  · Estimated Daily Protein (g): 70-80(1.3-1.5)  · Estimated Daily Total Fluid (ml/day): 1600ml     Nutrition Diagnosis:   · Problem: Increased nutrient needs  · Etiology: related to Increased demand for energy/nutrients(2/2 healing )     Signs and symptoms:  as evidenced by Presence of wounds    Objective Information:  · Nutrition-Focused Physical Findings: -i/os, soft abd, active bs, trace edema ntoed   · Wound Type: Surgical Wound  · Current Nutrition Therapies:  · Oral Diet Orders: General   · Oral Diet intake: %  · Oral Nutrition Supplement (ONS) Orders: Wound Healing Oral Supplement  · ONS intake: %  · Anthropometric Measures:  · Ht: 5' 4\" (162.6 cm)   · Current Body Wt: 187 lb (84.8 kg)(7/18 actual bedcale- CROW updated wt

## 2019-08-07 PROCEDURE — 97535 SELF CARE MNGMENT TRAINING: CPT

## 2019-08-07 PROCEDURE — 97530 THERAPEUTIC ACTIVITIES: CPT

## 2019-08-07 PROCEDURE — 6370000000 HC RX 637 (ALT 250 FOR IP): Performed by: PHYSICAL MEDICINE & REHABILITATION

## 2019-08-07 PROCEDURE — 6370000000 HC RX 637 (ALT 250 FOR IP): Performed by: INTERNAL MEDICINE

## 2019-08-07 PROCEDURE — 1280000000 HC REHAB R&B

## 2019-08-07 RX ADMIN — VITAMIN D, TAB 1000IU (100/BT) 1000 UNITS: 25 TAB at 09:00

## 2019-08-07 RX ADMIN — AMLODIPINE BESYLATE 5 MG: 5 TABLET ORAL at 08:59

## 2019-08-07 RX ADMIN — DOCUSATE SODIUM 100 MG: 100 CAPSULE, LIQUID FILLED ORAL at 21:12

## 2019-08-07 RX ADMIN — ESCITALOPRAM OXALATE 20 MG: 10 TABLET, FILM COATED ORAL at 08:59

## 2019-08-07 RX ADMIN — DOCUSATE SODIUM 100 MG: 100 CAPSULE, LIQUID FILLED ORAL at 08:59

## 2019-08-07 RX ADMIN — MULTIPLE VITAMINS W/ MINERALS TAB 1 TABLET: TAB at 09:00

## 2019-08-07 RX ADMIN — VITAM B12 50 MCG: 100 TAB at 08:59

## 2019-08-07 ASSESSMENT — PAIN SCALES - GENERAL
PAINLEVEL_OUTOF10: 0

## 2019-08-07 NOTE — PROGRESS NOTES
collar  4. Continue dvt prophylaxis  5. Continue focus on increased safety and independence  6.  Explore SNF placement at discharge          Kaleb Cazares MD

## 2019-08-07 NOTE — PROGRESS NOTES
Occupational Therapy  OCCUPATIONAL THERAPY DAILY NOTE    Date:2019  Patient Name: Aicha Del Toro  MRN: [id number]  : 1950  Room: [address] [place] [address]     DIAGNOSIS: ISCI  Pertinent History: To ED on 19 for numbness/tingling. s/p C3-6 PCF/laminectomy on 19 after fall before being discharged to SNF. S/P cervical wound exploration/evacuation of posterior cervical epidural hematoma on 7/3/19. PMH includes anxiety, depression, KRISTAL R LE, HTN  Precautions: Cervical collar AAT, c-spine precautions, spinal neutral mechanics, falls  Social History: Pt lives alone in a 2 floor plan 3-4 steps and 0 rails to enter. 12 steps upstairs with right HR. Prior to admission pt walked with no device but owns ww. Functional Assessment:   Date Status AE  Comments   Feeding 19 s/u   Continues to requires assist to open packages and cut up food   Grooming 19  Min A  Standing at sink to steady balance, pt able to use B hand to open toothpaste, quinton on toothbrush, and brush teeth   Bathing 19  Mod A   Cleared by RN to take full shower. Pt able to wash chest, abdomen, front jennifer area, and RUE. Required assist to wash LUE, buttocks, and thighs down to B feet   UB Dressing 19  Min A   Pt able to thread B arms, pull down over back, and doff from arms, required assist to quinton over head and doff overhead   LB Dressing 19  Mod A  long handle shoe horn, reacher    Pt refused use of reacher to thread BLEs into pants, therapist assisted with underwear and pants. Pt able to slip on shoes.  Pt required assist to pull underwear and pants over hips due to limited ROM to reach at back   Homemaking 19  Min A       Functional Transfers / Balance:   Date Status DME  Comments   Sit Balance 19 Supervision  On commode   Stand Balance 19 SBA     [x] Tub        [x] Shower   Transfer 19  CGA        Min A          Shower seat and grab bar         Required steadying assist with step in method into and out of shower   Commode   Transfer    toileting  8/7/19 8/5/19           Min A      Mod A  No device  Grab bar Assist with pants management   Functional   Mobility 8/7/19 SBA- occ CGA due to LOB No device- gait belt  SBA To/from bathroom, To/from OT gym    Other: sit to stand from chair with arms      Bed mobility      8/5/19 7/30/19      SBA            Min A    No device       Functional Exercises / Activity:  Pt sitting in w/c upon arrival to . Compelted ADL this AM, see above for assessment. Pt taken down to OT gym and engaged in therex using  with 1#, 2x5 up/downs to increase BUE strength for ease with functional transfers. Particiapted in card activity, standing at table to increase leisure, B hand coordination, ROM, and standing balance/tolerance for ease with stnaidng ADLS. Pt appeared to have tolerated session well. Sensory / Neuromuscular Re-Education:      Cognitive Skills:   Status Comments   Problem   Solving fair  During activities and ADLs. Memory fair + \"   Sequencing fair  \"   Safety fair  \"     Visual Perception:    Education:  Pt educated on techniques to increase independence with ADL's. Pt educated with regards to energy conservation & pacing strategies to use during mobility & transfers. [] Family teach completed on:    Pain Level: no report of pain    Additional Notes:   7/15/19: Pt reporting she had an assisted fall to the ground in the AM  7/23/19: dynamometer: R-29.7 L:17.7   9 hole peg: R: 1.06 L: 1.30  (table more elevated compared to eval, pt required assist to place peg into L hand by therapist)  8/2/19: Pt c/o fatigue and demonstrates fair balance and required CGA-Min A for recovery during X 2 LOB. Patient has made fair progress during treatment sessions toward set goals.  Therapy emphasis to obtain goals:ADL retraining, transfer training, home management, endurance/balance retraining, therex, pt/family education      [x] Continue with current OT Plan of care.   [] Prepare for Discharge     DISCHARGE RECOMMENDATIONS  Recommended DME:  Rw, 3:1, tub bench, walker tray    Post Discharge Care:   []Home Independently  []Home with 24hr Care / Supervision []Home with Partial Supervision []Home with Home Health OT []Home with Out Pt OT []Other: ___   Comments:         Time in Time out Tx Time Breakdown  Variance:   First Session  8:00 9:30 [x] Individual Tx- 90 mins  [] Concurrent Tx-  [] Co-Tx -   [] Group Tx -   [] Time Missed -     Second Session   [] Individual Tx-    [] Concurrent Tx -    [] Co-Tx -   [] Group Tx -   [] Time Missed -      Third Session    [] Individual Tx-   [] Concurrent Tx -  [] Co-Tx -   [] Group Tx -   [] Time Missed -         Total Tx Time: 90  Mins   Josie Lozano, [facility], OTR/L [id number]

## 2019-08-07 NOTE — PROGRESS NOTES
Physical Therapy    Facility/Department: [address] [facility]  Daily Treatment Note    NAME: Genny Puentes  : 1950  MRN: [id number]    Date of Service: 2019    Supervising Therapist: David Banerjee, PT, DPT    ROOM: [address] [place], [place] [id number]  DIAGNOSIS: incomplete spinal cord injury   PMH: To ED on 19 for numbness/tingling. s/p C3-6 PCF/laminectomy on 19 after fall before being discharged to SNF. S/P cervical wound exploration/evacuation of posterior cervical epidural hematoma on 7/3/19. PMH includes anxiety, depression, KRISTAL R LE, HTN    PRECAUTIONS: Cervical collar AAT, c-spine precautions, spinal neutral mechanics, falls     Social: Pt lives alone in a 2 floor plan 3-4 steps and 0 rails to enter. 12 steps upstairs with right HR. Prior to admission pt walked with no device but owns ww. Initial Evaluation  19 AM     PM   Short Term Goals Long Term Goals    Was pt agreeable to Eval/treatment? yes yes yes     Does pt have pain?  4/10 neck pain  none No c/o pain     Bed Mobility  Rolling: min/modA  Supine to sit: modA  Sit to supine: maxA  Scooting: modA Modified Independent all aspects (twin bed) NT sup Mod I   Transfers Sit to stand: mod/maxA  Stand to sit: mod/maxA  Stand pivot: maxA with ww Sit<>Stand Supervision  Stand-pivot close supervision without AD Sit<>stand Supervision    Stand-pivot close supervision Jennifer with AAD  with AAD modified independent   Ambulation    50 feet with ww with Jennifer 800 feet x 1 rep and 400 feet x 1 rep without AD with Supervision    800 feet x 1 rep and 400 feet x 1 rep without AD with Supervision >150 feet with AAD with sba >250 feet with AAD with  modified independent   Wheel Chair Mobility Nt NT NT  400 feet with available extremities with Modified Independent   Car Transfers NT Supervision NT Jennifer Sup    Stair negotiation: ascended and descended NT 20 steps with 1 HR with SBA (down FW) 30 steps with 1 HR with SBA (down FW)     4 steps with one rail with Jennifer >12 steps with basic mobility activities. Higher level balance activities however result in occasional LOB from excessive R lateral lean and impulsive attempts at correction. Cervical collar results in partially impeded vision which contributes to occasional LOB as pt exhibits sensory/proprioceptive deficits of BLE. Pt had significant difficulty and multiple LOB with static standing and visually tracking therapist hand. Will continue to address this next session. AM  Time in: 1045  Time out: 1130    PM  Time in: 1515  Time out: 1600    Pt is making good progress toward established Physical Therapy goals. Continue with physical therapy current plan of care.     Rebecca Poole, PT, DPT  [phone]

## 2019-08-08 PROCEDURE — 97530 THERAPEUTIC ACTIVITIES: CPT

## 2019-08-08 PROCEDURE — 97535 SELF CARE MNGMENT TRAINING: CPT

## 2019-08-08 PROCEDURE — 1280000000 HC REHAB R&B

## 2019-08-08 PROCEDURE — 6370000000 HC RX 637 (ALT 250 FOR IP): Performed by: INTERNAL MEDICINE

## 2019-08-08 PROCEDURE — 6370000000 HC RX 637 (ALT 250 FOR IP): Performed by: PHYSICAL MEDICINE & REHABILITATION

## 2019-08-08 PROCEDURE — 97110 THERAPEUTIC EXERCISES: CPT

## 2019-08-08 RX ADMIN — DOCUSATE SODIUM 100 MG: 100 CAPSULE, LIQUID FILLED ORAL at 08:13

## 2019-08-08 RX ADMIN — VITAMIN D, TAB 1000IU (100/BT) 1000 UNITS: 25 TAB at 08:13

## 2019-08-08 RX ADMIN — MULTIPLE VITAMINS W/ MINERALS TAB 1 TABLET: TAB at 08:13

## 2019-08-08 RX ADMIN — DOCUSATE SODIUM 100 MG: 100 CAPSULE, LIQUID FILLED ORAL at 20:58

## 2019-08-08 RX ADMIN — AMLODIPINE BESYLATE 5 MG: 5 TABLET ORAL at 08:13

## 2019-08-08 RX ADMIN — ESCITALOPRAM OXALATE 20 MG: 10 TABLET, FILM COATED ORAL at 08:13

## 2019-08-08 ASSESSMENT — PAIN SCALES - GENERAL
PAINLEVEL_OUTOF10: 0

## 2019-08-08 NOTE — PROGRESS NOTES
SBA       [x] Tub           [x] Shower   Transfer 7/25/19 8/7/19  CGA           Min A              Shower seat and grab bar             Required steadying assist with step in method into and out of shower   Commode   Transfer     toileting  8/7/19 8/5/19              Min A        Mod A  No device  Grab bar Assist with pants management   Functional   Mobility 8/7/19 SBA- occ CGA due to LOB No device- gait belt  SBA To/from bathroom, To/from OT gym    Other: sit to stand from chair with arms        Bed mobility        8/5/19 7/30/19        SBA                 Min A     No device         Functional Exercises / Activity:  Pt sitting in w/c upon arrival to . Compelted ADL this AM, see above for assessment. Pt taken down to OT gym and engaged in therex using  with 1#, 2x5 up/downs to increase BUE strength for ease with functional transfers. Particiapted in card activity, standing at table to increase leisure, B hand coordination, ROM, and standing balance/tolerance for ease with stnaidng ADLS. Pt appeared to have tolerated session well.     Sensory / Neuromuscular Re-Education:        Cognitive Skills:    Status Comments   Problem   Solving fair  During activities and ADLs. Memory fair + \"   Sequencing fair  \"   Safety fair  \"         Scheduled Meds:   vitamin D  1,000 Units Oral Daily    docusate sodium  100 mg Oral BID    amLODIPine  5 mg Oral Daily    escitalopram  20 mg Oral Daily    fluticasone  1 spray Nasal Daily    therapeutic multivitamin-minerals  1 tablet Oral Daily    vitamin B-12  50 mcg Oral Daily     Continuous Infusions:  PRN Meds:oxyCODONE **OR** [DISCONTINUED] oxyCODONE, cyclobenzaprine, acetaminophen, polyvinyl alcohol  I/O last 3 completed shifts: In: 1080 [P.O.:1080]  Out: 2100 [Urine:2100]  I/O this shift: In: 480 [P.O.:480]  Out: 900 [Urine:900]    Labs reviewed  CBC: No results for input(s): WBC, HGB, PLT in the last 72 hours.   BMP:  No

## 2019-08-08 NOTE — PLAN OF CARE
Problem: Pain:  Goal: Pain level will decrease  Description  Pain level will decrease  Outcome: Met This Shift     Problem: Neurological  Goal: Maximum potential motor/sensory/cognitive function  Outcome: Ongoing     Problem: Falls - Risk of:  Goal: Will remain free from falls  Description  Will remain free from falls  Outcome: Met This Shift

## 2019-08-08 NOTE — PROGRESS NOTES
deficits      PT recommended ELOS 4-5 weeks 4 weeks 3 weeks 2 weeks 1 week Rec discharge 8/9      Team's Discharge Plan  4 weeks 3 weeks 2 weeks 1 week  Discharge 8/9/19 Discharge 8/9/19      Therapist at [address], [place], DPT [id number]   Jessie Valiente, PT,DPT Ashish Dillard, PT, DPT [id number]   Ashish Dillard, PT, DPT [id number]   Ashish Dillard, PT, DPT [id number]          Date:  7/11/19  Supporting factors:  Pt motivated and has good strength  Barriers to discharge:  Sensory deficits, decreased coordination, easily distracted  Additional comments:  Pt has poor  of L hand on ww even with  on ww built up. Pt amb without A.D. Mod/MaxA  DME:  TBD  After Care:  HHPT with 24 hour assistance    Mark Robertson [id number]    Date:  7/18/19  Supporting factors:  Pt exhibiting significantly improving physical function  Barriers to discharge:  Poor safety awareness, poor righting reactions, easily distractible, not proactive in care  Additional comments:  Pt unable to safely control WW/device due to UE deficits, pt distracted by attempts to use device leading to LOB. Pt has impaired attention and requires frequent cueing for safe technique. DME:  TBD  After Care:  HHPT with 24/7 supervision    Date:  7/25/19  Supporting factors:  Pt continues to exhibit significant functional improvement. Sit<>Stand transfer training has resulted in significant improvement. Pt participates well and works hard. Barriers to discharge:  Remaining balance/sensory deficits result in impaired righting reactions. Pt has some safety deficits, is easily distracted and has difficulty maintaining attention to task  Additional comments:  Goals updated based on rate of progress.  Pt has potential to achieve independence with basic transfers/mobility  DME:  1/2 rails for bed to facilitate independent bed mobility  After Care:  HHPT    Date:  8/1/19  Supporting factors:  Pt continues to exhibit improving balance and mobility tolerance  Barriers to discharge:  Remains mildly impulsive and with poor safety awareness/judgement at times  Additional comments:  Pt tolerates long distance ambulation outdoors over even/uneven concrete, grass, rocks, mulch, incline/declines on concrete and grass with occasional and self corrected LOB. Pt still having difficulty standing from low surfaces. Sensory/proprioceptive deficits remain an issue.    DME:  N/A  After Care:  HHPT    Date:  8/8/19  Supporting factors:  Pt exhibits improving balance and activity tolerance  Barriers to discharge:  UE deficits, poor safety, sensory/proprioceptive deficits  Additional comments:  Recommend FT if son is transporting pt, scheduled for 8/8  DME:  NA/  After Care:  NA      Sesar Gamez, PT, DPT  [phone]

## 2019-08-08 NOTE — PROGRESS NOTES
rail with sup   Curb Step:   ascended and descended NT NT 6 inch curb step outdoors x 3 reps with SBA 4 inch step with AAD and Jennifer 4 inch step with AAD and sup    BLE ROM WFL WNL      BLE Strength Bilateral LE: 4-/5 Bilateral LE: 4-/5      Balance  Sitting: sup  Standing: mod/maxA with ww Static and dynamic standing balance SBA/ close supervision without AD Static and dynamic standing balance SBA/supervision without AD     Date Family Teach Completed TBA       Is additional Family Teaching Needed? Y or N Y pending improvement Y Y     Hindering Progress Safety awareness, clonus/flexor withdrawal L LE, coordination Balance/ coordination and sensory deficits Balance/ coordination and sensory deficits     PT recommended ELOS 4-5 weeks       Team's Discharge Plan        Therapist at Team Meeting          Therapeutic Exercise:   AM: Semi-tandem walking without UE support pausing between steps 100 feet x 2 reps with SBA/CGA  Static standing semi-tandem stance visually tracking target 1 minute x 4 reps L and R FW with CGA/Jennifer  Braiding 45 feet x 1 rep L and R with CGA  PM:   Braiding 55 feet x 1 rep L and R with SBA/CGA  Tandem walking 100 feet x 1 rep without UE support with SBA  Sit<>stand transfer x 10 reps from low armchair with SBA  Static standing with leading foot on 4 inch step, semi-tandem stance visually tracking target through eye fields 1 minute x 2 reps L and R    Patient education  Pt educated on slowed speed of turning following stair negotiation    Patient response to education:   Pt verbalized understanding Pt demonstrated skill Pt requires further education in this area   yes partial yes     Additional Comments: Pt exhibits improved tolerance and ability to maintain static standing balance during visual tracking tasks when leading foot of semi-tandem stance on level ground. Pt continues to exhibit sensory/proprioceptive deficits of BLE and mild impulsivity leading to LOB with turning.  Pt able to self correct LOB. Excessive R lateral lean persists with activities with reduced JAMES. Outdoor ambulation repetition progressed to increased time/distance without rest break. Pt exhibits no LOB outdoors. Plan for FT prior to discharge tomorrow. AM  Time in: 1045  Time out: 1130    PM  Time in: 1515  Time out: 1600    Pt is making good progress toward established Physical Therapy goals. Continue with physical therapy current plan of care.     Crystal Parekh, PT, DPT  [phone]

## 2019-08-09 VITALS
TEMPERATURE: 97.8 F | WEIGHT: 187.7 LBS | HEIGHT: 64 IN | BODY MASS INDEX: 32.04 KG/M2 | OXYGEN SATURATION: 92 % | SYSTOLIC BLOOD PRESSURE: 141 MMHG | RESPIRATION RATE: 18 BRPM | HEART RATE: 87 BPM | DIASTOLIC BLOOD PRESSURE: 76 MMHG

## 2019-08-09 PROCEDURE — 97530 THERAPEUTIC ACTIVITIES: CPT

## 2019-08-09 PROCEDURE — 6370000000 HC RX 637 (ALT 250 FOR IP): Performed by: PHYSICAL MEDICINE & REHABILITATION

## 2019-08-09 PROCEDURE — 6370000000 HC RX 637 (ALT 250 FOR IP): Performed by: INTERNAL MEDICINE

## 2019-08-09 PROCEDURE — 97535 SELF CARE MNGMENT TRAINING: CPT

## 2019-08-09 RX ORDER — PSEUDOEPHEDRINE HCL 30 MG
100 TABLET ORAL 2 TIMES DAILY
COMMUNITY
Start: 2019-08-09

## 2019-08-09 RX ORDER — CYCLOBENZAPRINE HCL 10 MG
10 TABLET ORAL NIGHTLY PRN
Qty: 20 TABLET | Refills: 0
Start: 2019-08-09 | End: 2019-08-19

## 2019-08-09 RX ORDER — POLYVINYL ALCOHOL 14 MG/ML
1 SOLUTION/ DROPS OPHTHALMIC PRN
Qty: 1 BOTTLE | Refills: 4
Start: 2019-08-09 | End: 2019-09-08

## 2019-08-09 RX ADMIN — AMLODIPINE BESYLATE 5 MG: 5 TABLET ORAL at 08:08

## 2019-08-09 RX ADMIN — DOCUSATE SODIUM 100 MG: 100 CAPSULE, LIQUID FILLED ORAL at 08:09

## 2019-08-09 RX ADMIN — FLUTICASONE PROPIONATE 1 SPRAY: 50 SPRAY, METERED NASAL at 08:11

## 2019-08-09 RX ADMIN — VITAMIN D, TAB 1000IU (100/BT) 1000 UNITS: 25 TAB at 08:08

## 2019-08-09 RX ADMIN — MULTIPLE VITAMINS W/ MINERALS TAB 1 TABLET: TAB at 08:08

## 2019-08-09 RX ADMIN — ESCITALOPRAM OXALATE 20 MG: 10 TABLET, FILM COATED ORAL at 08:08

## 2019-08-09 RX ADMIN — VITAM B12 50 MCG: 100 TAB at 08:08

## 2019-08-09 ASSESSMENT — PAIN SCALES - GENERAL: PAINLEVEL_OUTOF10: 0

## 2019-08-09 NOTE — PROGRESS NOTES
Physical Therapy    Facility/Department: [facility] 5SE REHAB  Daily Treatment Note    NAME: Elizabeth Vallecillo  : 1950  MRN: [id number]    Date of Service: 2019    Supervising Therapist: Mali Bangura, PT, DPT    ROOM: [address] [place] [address]  DIAGNOSIS: incomplete spinal cord injury   PMH: To ED on 19 for numbness/tingling. s/p C3-6 PCF/laminectomy on 19 after fall before being discharged to SNF. S/P cervical wound exploration/evacuation of posterior cervical epidural hematoma on 7/3/19. PMH includes anxiety, depression, KRISTAL R LE, HTN    PRECAUTIONS: Cervical collar AAT, c-spine precautions, spinal neutral mechanics, falls     Social: Pt lives alone in a 2 floor plan 3-4 steps and 0 rails to enter. 12 steps upstairs with right HR. Prior to admission pt walked with no device but owns ww. Initial Evaluation  19 AM     PM   Short Term Goals Long Term Goals    Was pt agreeable to Eval/treatment? yes yes yes     Does pt have pain?  4/10 neck pain  none No c/o pain     Bed Mobility  Rolling: min/modA  Supine to sit: modA  Sit to supine: maxA  Scooting: modA Modified Independent all aspects (twin bed) NT sup Mod I   Transfers Sit to stand: mod/maxA  Stand to sit: mod/maxA  Stand pivot: maxA with ww Sit<>Stand Supervision  Stand-pivot close supervision without AD Sit<>stand Supervision    Stand-pivot close supervision Jennifer with AAD  with AAD modified independent   Ambulation    50 feet with ww with Jennifer 800 feet x 1 rep and 400 feet x 1 rep without AD with Supervision    400 feet x 2 reps without AD with Supervision >150 feet with AAD with sba >250 feet with AAD with  modified independent   Wheel Chair Mobility Nt NT NT  400 feet with available extremities with Modified Independent   Car Transfers NT Supervision Supervision Jennifer Sup    Stair negotiation: ascended and descended NT 30 steps with 1 HR with SBA (down FW) NT     4 steps with one rail with Jennifer >12 steps with one rail with sup   Curb Step:   ascended and

## 2019-08-09 NOTE — PROGRESS NOTES
term goal 8: Pt will demo G endurance to tolerate a 20-30 min functional activity session  Long term goal 9: Pt will demo G insight, reasoning, judgement, safety, and adherence to spinal precautions during functional activities           Recommended DME:  Rw, 3:1, tub bench, walker tray    Post Discharge Care:   []Home Independently  []Home with 24hr Care / Supervision []Home with Partial Supervision []Home with Home Health OT []Home with Out Pt OT []Other: ___   Comments:         Time in Time out Tx Time Breakdown  Variance:   First Session  0915 1000 [x] Individual Tx- 45 mins  [] Concurrent Tx-  [] Co-Tx -   [] Group Tx -   [] Time Missed -     Second Session   [] Individual Tx-    [] Concurrent Tx -   [] Co-Tx -   [] Group Tx -   [] Time Missed -      Third Session    [] Individual Tx-   [] Concurrent Tx -  [] Co-Tx -   [] Group Tx -   [] Time Missed -         Total Tx Time: 45 Mins     Kunal Navarrete  RAMEY/L [id number]     I have read & agree with the above status.     [place] OTR/L [id number]

## 2019-08-20 LAB
AFB CULTURE (MYCOBACTERIA): NORMAL
AFB SMEAR: NORMAL

## 2019-09-03 DIAGNOSIS — M54.2 NECK PAIN: Primary | ICD-10-CM

## 2019-09-20 ENCOUNTER — HOSPITAL ENCOUNTER (OUTPATIENT)
Dept: GENERAL RADIOLOGY | Age: 69
Discharge: HOME | End: 2019-09-22
Payer: MEDICARE

## 2019-09-20 ENCOUNTER — HOSPITAL ENCOUNTER (OUTPATIENT)
Age: 69
Discharge: HOME | End: 2019-09-22
Payer: MEDICARE

## 2019-09-20 ENCOUNTER — OFFICE VISIT (OUTPATIENT)
Dept: NEUROSURGERY | Age: 69
End: 2019-09-20

## 2019-09-20 VITALS
BODY MASS INDEX: 30.73 KG/M2 | DIASTOLIC BLOOD PRESSURE: 75 MMHG | WEIGHT: 180 LBS | SYSTOLIC BLOOD PRESSURE: 144 MMHG | HEART RATE: 83 BPM | HEIGHT: 64 IN

## 2019-09-20 DIAGNOSIS — M48.02 CERVICAL STENOSIS OF SPINAL CANAL: Primary | ICD-10-CM

## 2019-09-20 DIAGNOSIS — M54.2 CERVICALGIA: ICD-10-CM

## 2019-09-20 PROCEDURE — 99024 POSTOP FOLLOW-UP VISIT: CPT | Performed by: PHYSICIAN ASSISTANT

## 2019-09-20 PROCEDURE — 72040 X-RAY EXAM NECK SPINE 2-3 VW: CPT

## 2019-09-20 NOTE — LETTER
[facility] Neurosurgery  [address]  Phone: [phone]  Fax: [phone]    Juan Carlos Celis        September 20, 2019     Umer Lawson, [address]    Patient: Ibeth Wood  MR Number: [id number]  YOB: 1950  Date of Visit: 9/20/2019    Dear Dr. Umer Lawson:    Thank you for the request for consultation for Ibeth Wood to me for the evaluation. Below are the relevant portions of my assessment and plan of care. Vitals:    09/20/19 1221   BP: (!) 144/75   Site: Left Upper Arm   Position: Sitting   Pulse: 83   Weight: 180 lb (81.6 kg)   Height: 5' 4\" (1.626 m)     Post Operative Visit     This is a 77year old white female who presents to the office for 3 months follow-up for C3-C6 PCF with post-op cervical hematoma evacuation. Subjective: Patient denies neck pain. Admits to continued n/t and weakness in the arms and hands. Collar compliance. Is participating in home care. Ambulating well independently. Denies incisional issues. Present with son, generalized questions answered. Patient very upset about her insurance bill. Physical Exam:   WDWN, no apparent distress   Non-labored breathing    Vitals Stable   A&O x 3   FC x 4 ext   Pupils equal in size   EOMI   4/5 in UE bilaterally   4/5 in LE bilaterally   Sensation to light touch intact bilaterally    Imaging: Cervical x-rays reviewed    Assessment: Patient is 3 months s/p C3-C6 PCF. Stable. Plan:  -X-rays reviewed  -D/C collar  -PT for neck and arm exercises--will add to existing home PT. Patient to call is able to receive outpatient PT at any time  -Ambulation encouraged  -Activities as tolerated   -Instructed to call the hospital for insurance questions pertaining to her admission   -RTC in 9 months with x-rays       If you have questions, please do not hesitate to call me. I look forward to following Quinton Holloway along with you.     Sincerely, Daniela Stubbs, PA

## 2019-10-01 NOTE — DISCHARGE SUMMARY
hand to bring to mouth. Later on, LPN reporting pt spilled juice on herself post session)  Grooming: Minimal assistance (seated at sink for oral hygiene, required assist to reach items, open toothpaste, and comb back of hair, observed occasional shaky hands)  UE sponge Bathing: Maximum assistance (pt able to only complete chest and surface of L arm, limited due to ROM)  LE sponge Bathing: Maximum assistance (pt able to complete thighs, but required assist with jennifer areas, B feet/shins, sit-stand)  UE Dressing: Moderate assistance (required cueing to thread B arms first, but required assist to quinton overhead and pull shirt down back)  LE Dressing: Maximum assistance (assist to thread B legs into pants and pull over hips)     Tone RUE  RUE Tone: Normotonic  Tone LUE  LUE Tone: Normotonic  Coordination  Movements Are Fluid And Coordinated: No (see 9 hole peg test, limited due to numbness in B hands)  Bed mobility  Supine to Sit: Moderate assistance (cues for log rolling technique)  Transfers  Sit to stand: Minimal assistance (from bed)  Vision - Basic Assessment  Prior Vision: Wears glasses all the time     Cognition  Overall Cognitive Status: WFL  Arousal/Alertness: Appropriate responses to stimuli  Following Commands: Follows multistep commands consistently; Follows multistep commands with increased time (pt able to follow a 3 step command of writing name, writing fun fact about self, and folding paper in half)  Attention Span: Appears intact  Memory: Appears intact  Safety Judgement: Decreased awareness of need for safety;Decreased awareness of need for assistance  Problem Solving: Assistance required to generate solutions;Assistance required to correct errors made;Assistance required to implement solutions;Assistance required to identify errors made  Insights: Decreased awareness of deficits  Initiation: Does not require cues  Sequencing: Does not require cues       Discharge Functional Status:      Initial 3.9    Chloride Latest Ref Range: 98 - 107 mmol/L 97 (L) 102    CO2 Latest Ref Range: 22 - 29 mmol/L 28 27    BUN Latest Ref Range: 8 - 23 mg/dL 24 (H) 22    Creatinine Latest Ref Range: 0.5 - 1.0 mg/dL 0.7 0.6    Anion Gap Latest Ref Range: 7 - 16 mmol/L 12 12    GFR Non- Latest Ref Range: >=60 mL/min/1.73 >60 >60    GFR  Unknown >60 >60    Glucose Latest Ref Range: 74 - 99 mg/dL 198 (H) 110 (H)    Calcium Latest Ref Range: 8.6 - 10.2 mg/dL 10.0 9.6    Total Protein Latest Ref Range: 6.4 - 8.3 g/dL  6.5    Albumin Latest Ref Range: 3.5 - 5.2 g/dL  3.7    Alk Phos Latest Ref Range: 35 - 104 U/L  125 (H)    ALT Latest Ref Range: 0 - 32 U/L  16    AST Latest Ref Range: 0 - 31 U/L  14    Bilirubin Latest Ref Range: 0.0 - 1.2 mg/dL  0.3    WBC Latest Ref Range: 4.5 - 11.5 E9/L 9.0 5.6    RBC Latest Ref Range: 3.50 - 5.50 E12/L 3.84 4.04    Hemoglobin Quant Latest Ref Range: 11.5 - 15.5 g/dL 10.8 (L) 11.4 (L)    Hematocrit Latest Ref Range: 34.0 - 48.0 % 34.6 36.7    MCV Latest Ref Range: 80.0 - 99.9 fL 90.1 90.8    MCH Latest Ref Range: 26.0 - 35.0 pg 28.1 28.2    MCHC Latest Ref Range: 32.0 - 34.5 % 31.2 (L) 31.1 (L)    MPV Latest Ref Range: 7.0 - 12.0 fL 9.4 9.1    RDW Latest Ref Range: 11.5 - 15.0 fL 14.1 15.0    Platelet Count Latest Ref Range: 130 - 450 E9/L 321 238    Neutrophils % Latest Ref Range: 43.0 - 80.0 % 72.5     Immature Granulocytes % Latest Ref Range: 0.0 - 5.0 % 1.0     Lymphocyte % Latest Ref Range: 20.0 - 42.0 % 14.8 (L)     Monocytes % Latest Ref Range: 2.0 - 12.0 % 7.8     Eosinophils % Latest Ref Range: 0.0 - 6.0 % 3.3     Basophils % Latest Ref Range: 0.0 - 2.0 % 0.6     Neutrophils Absolute Latest Ref Range: 1.80 - 7.30 E9/L 6.55     Immature Granulocytes # Latest Units: E9/L 0.09     Lymphocytes Absolute Latest Ref Range: 1.50 - 4.00 E9/L 1.34 (L)     Monocytes Absolute Latest Ref Range: 0.10 - 0.95 E9/L 0.70     Eosinophils Absolute Latest Ref Range: 0.05 -

## 2019-11-12 ENCOUNTER — TELEPHONE (OUTPATIENT)
Dept: NEUROSURGERY | Age: 69
End: 2019-11-12

## 2019-11-15 ENCOUNTER — TELEPHONE (OUTPATIENT)
Dept: NEUROSURGERY | Age: 69
End: 2019-11-15

## 2019-11-15 RX ORDER — GABAPENTIN 300 MG/1
300 CAPSULE ORAL 3 TIMES DAILY
Qty: 90 CAPSULE | Refills: 3 | Status: SHIPPED | OUTPATIENT
Start: 2019-11-15 | End: 2019-12-15

## 2020-01-01 ENCOUNTER — HOSPITAL ENCOUNTER (OUTPATIENT)
Dept: GENERAL RADIOLOGY | Age: 70
Discharge: HOME | End: 2020-07-03
Payer: MEDICARE

## 2020-01-01 ENCOUNTER — OFFICE VISIT (OUTPATIENT)
Dept: NEUROSURGERY | Age: 70
End: 2020-01-01
Payer: MEDICARE

## 2020-01-01 ENCOUNTER — HOSPITAL ENCOUNTER (OUTPATIENT)
Age: 70
Discharge: HOME | End: 2020-09-05

## 2020-01-01 ENCOUNTER — HOSPITAL ENCOUNTER (OUTPATIENT)
Age: 70
Discharge: HOME | End: 2020-09-13

## 2020-01-01 ENCOUNTER — HOSPITAL ENCOUNTER (OUTPATIENT)
Age: 70
Discharge: HOME | End: 2020-07-03
Payer: MEDICARE

## 2020-01-01 VITALS
TEMPERATURE: 98.1 F | DIASTOLIC BLOOD PRESSURE: 63 MMHG | SYSTOLIC BLOOD PRESSURE: 140 MMHG | HEIGHT: 64 IN | WEIGHT: 180 LBS | HEART RATE: 68 BPM | BODY MASS INDEX: 30.73 KG/M2

## 2020-01-01 LAB
ABO/RH: NORMAL
ANION GAP SERPL CALCULATED.3IONS-SCNC: 10 MMOL/L (ref 7–16)
ANION GAP SERPL CALCULATED.3IONS-SCNC: 11 MMOL/L (ref 7–16)
ANTIBODY SCREEN: NORMAL
BUN BLDV-MCNC: 11 MG/DL (ref 8–23)
BUN BLDV-MCNC: 16 MG/DL (ref 8–23)
CALCIUM SERPL-MCNC: 9.1 MG/DL (ref 8.6–10.2)
CALCIUM SERPL-MCNC: 9.5 MG/DL (ref 8.6–10.2)
CHLORIDE BLD-SCNC: 103 MMOL/L (ref 98–107)
CHLORIDE BLD-SCNC: 104 MMOL/L (ref 98–107)
CO2: 24 MMOL/L (ref 22–29)
CO2: 27 MMOL/L (ref 22–29)
CREAT SERPL-MCNC: 0.6 MG/DL (ref 0.5–1)
CREAT SERPL-MCNC: 0.7 MG/DL (ref 0.5–1)
GFR AFRICAN AMERICAN: >60
GFR AFRICAN AMERICAN: >60
GFR NON-AFRICAN AMERICAN: >60 ML/MIN/1.73
GFR NON-AFRICAN AMERICAN: >60 ML/MIN/1.73
GLUCOSE BLD-MCNC: 108 MG/DL (ref 74–99)
GLUCOSE BLD-MCNC: 139 MG/DL (ref 74–99)
HCT VFR BLD CALC: 40.5 % (ref 34–48)
HCT VFR BLD CALC: 42 % (ref 34–48)
HEMOGLOBIN: 12.9 G/DL (ref 11.5–15.5)
HEMOGLOBIN: 13.4 G/DL (ref 11.5–15.5)
MCH RBC QN AUTO: 29.3 PG (ref 26–35)
MCHC RBC AUTO-ENTMCNC: 31.9 % (ref 32–34.5)
MCV RBC AUTO: 91.7 FL (ref 80–99.9)
MRSA CULTURE ONLY: NORMAL
PDW BLD-RTO: 13.9 FL (ref 11.5–15)
PLATELET # BLD: 91 E9/L (ref 130–450)
PLATELET CONFIRMATION: NORMAL
PMV BLD AUTO: 10.9 FL (ref 7–12)
POTASSIUM SERPL-SCNC: 3.8 MMOL/L (ref 3.5–5)
POTASSIUM SERPL-SCNC: 4.1 MMOL/L (ref 3.5–5)
RBC # BLD: 4.58 E12/L (ref 3.5–5.5)
SODIUM BLD-SCNC: 138 MMOL/L (ref 132–146)
SODIUM BLD-SCNC: 141 MMOL/L (ref 132–146)
WBC # BLD: 8.2 E9/L (ref 4.5–11.5)

## 2020-01-01 PROCEDURE — 86901 BLOOD TYPING SEROLOGIC RH(D): CPT

## 2020-01-01 PROCEDURE — 1123F ACP DISCUSS/DSCN MKR DOCD: CPT | Performed by: NEUROLOGICAL SURGERY

## 2020-01-01 PROCEDURE — 80048 BASIC METABOLIC PNL TOTAL CA: CPT

## 2020-01-01 PROCEDURE — 87081 CULTURE SCREEN ONLY: CPT

## 2020-01-01 PROCEDURE — 85014 HEMATOCRIT: CPT

## 2020-01-01 PROCEDURE — 99213 OFFICE O/P EST LOW 20 MIN: CPT | Performed by: NEUROLOGICAL SURGERY

## 2020-01-01 PROCEDURE — 1090F PRES/ABSN URINE INCON ASSESS: CPT | Performed by: NEUROLOGICAL SURGERY

## 2020-01-01 PROCEDURE — G8427 DOCREV CUR MEDS BY ELIG CLIN: HCPCS | Performed by: NEUROLOGICAL SURGERY

## 2020-01-01 PROCEDURE — 86900 BLOOD TYPING SEROLOGIC ABO: CPT

## 2020-01-01 PROCEDURE — G8417 CALC BMI ABV UP PARAM F/U: HCPCS | Performed by: NEUROLOGICAL SURGERY

## 2020-01-01 PROCEDURE — G8400 PT W/DXA NO RESULTS DOC: HCPCS | Performed by: NEUROLOGICAL SURGERY

## 2020-01-01 PROCEDURE — 1036F TOBACCO NON-USER: CPT | Performed by: NEUROLOGICAL SURGERY

## 2020-01-01 PROCEDURE — 3017F COLORECTAL CA SCREEN DOC REV: CPT | Performed by: NEUROLOGICAL SURGERY

## 2020-01-01 PROCEDURE — 86850 RBC ANTIBODY SCREEN: CPT

## 2020-01-01 PROCEDURE — 72040 X-RAY EXAM NECK SPINE 2-3 VW: CPT

## 2020-01-01 PROCEDURE — 4040F PNEUMOC VAC/ADMIN/RCVD: CPT | Performed by: NEUROLOGICAL SURGERY

## 2020-01-01 PROCEDURE — 85018 HEMOGLOBIN: CPT

## 2020-01-01 PROCEDURE — 85027 COMPLETE CBC AUTOMATED: CPT

## 2020-01-24 ENCOUNTER — TELEPHONE (OUTPATIENT)
Dept: NEUROSURGERY | Age: 70
End: 2020-01-24

## 2020-02-21 ENCOUNTER — TELEPHONE (OUTPATIENT)
Dept: NEUROSURGERY | Age: 70
End: 2020-02-21

## 2020-07-01 NOTE — PROGRESS NOTES
Post Operative Follow-up    Patient is status post: posterior C3-C6 fusion. She still has some neck pain and myelopathy    Physical Exam  Alert and Oriented X 3  PERRLA, EOMI  ARAUJO /5  Wound: C/D/I    A/P: patient is s/p cervical fusion. She is stable. Her x-rays look good.   No intervention follow up elsie Moctezuma

## 2021-01-01 ENCOUNTER — IMMUNIZATION (OUTPATIENT)
Dept: PRIMARY CARE CLINIC | Age: 71
End: 2021-01-01
Payer: MEDICARE

## 2021-01-01 ENCOUNTER — HOSPITAL ENCOUNTER (EMERGENCY)
Age: 71
End: 2021-06-30
Attending: EMERGENCY MEDICINE
Payer: MEDICARE

## 2021-01-01 DIAGNOSIS — I46.9 CARDIAC ARREST (HCC): ICD-10-CM

## 2021-01-01 PROCEDURE — 0002A COVID-19, PFIZER VACCINE 30MCG/0.3ML DOSE: CPT | Performed by: PHYSICIAN ASSISTANT

## 2021-01-01 PROCEDURE — 2500000003 HC RX 250 WO HCPCS

## 2021-01-01 PROCEDURE — 2580000003 HC RX 258

## 2021-01-01 PROCEDURE — 31500 INSERT EMERGENCY AIRWAY: CPT

## 2021-01-01 PROCEDURE — 91300 COVID-19, PFIZER VACCINE 30MCG/0.3ML DOSE: CPT | Performed by: CLINICAL NURSE SPECIALIST

## 2021-01-01 PROCEDURE — 6360000002 HC RX W HCPCS: Performed by: EMERGENCY MEDICINE

## 2021-01-01 PROCEDURE — 91300 COVID-19, PFIZER VACCINE 30MCG/0.3ML DOSE: CPT | Performed by: PHYSICIAN ASSISTANT

## 2021-01-01 PROCEDURE — 99282 EMERGENCY DEPT VISIT SF MDM: CPT

## 2021-01-01 PROCEDURE — 0001A COVID-19, PFIZER VACCINE 30MCG/0.3ML DOSE: CPT | Performed by: CLINICAL NURSE SPECIALIST

## 2021-01-01 PROCEDURE — 92950 HEART/LUNG RESUSCITATION CPR: CPT

## 2021-01-01 PROCEDURE — 93005 ELECTROCARDIOGRAM TRACING: CPT

## 2021-01-01 PROCEDURE — 2500000003 HC RX 250 WO HCPCS: Performed by: EMERGENCY MEDICINE

## 2021-01-01 PROCEDURE — 6360000002 HC RX W HCPCS

## 2021-01-01 RX ORDER — EPINEPHRINE 1 MG/ML
INJECTION, SOLUTION, CONCENTRATE INTRAVENOUS DAILY PRN
Status: COMPLETED | OUTPATIENT
Start: 2021-01-01 | End: 2021-01-01

## 2021-01-01 RX ORDER — SODIUM CHLORIDE 9 MG/ML
INJECTION INTRAVENOUS DAILY PRN
Status: COMPLETED | OUTPATIENT
Start: 2021-01-01 | End: 2021-07-01

## 2021-01-01 RX ORDER — MAGNESIUM SULFATE HEPTAHYDRATE 500 MG/ML
INJECTION, SOLUTION INTRAMUSCULAR; INTRAVENOUS DAILY PRN
Status: COMPLETED | OUTPATIENT
Start: 2021-01-01 | End: 2021-01-01

## 2021-01-01 RX ORDER — EPINEPHRINE 0.1 MG/ML
SYRINGE (ML) INJECTION DAILY PRN
Status: COMPLETED | OUTPATIENT
Start: 2021-01-01 | End: 2021-01-01

## 2021-01-01 RX ADMIN — SODIUM BICARBONATE 50 MEQ: 84 INJECTION, SOLUTION INTRAVENOUS at 15:10

## 2021-01-01 RX ADMIN — MAGNESIUM SULFATE HEPTAHYDRATE 2000 MG: 500 INJECTION, SOLUTION INTRAMUSCULAR; INTRAVENOUS at 15:11

## 2021-01-01 RX ADMIN — EPINEPHRINE 1 MG: 0.1 INJECTION, SOLUTION ENDOTRACHEAL; INTRACARDIAC; INTRAVENOUS at 15:07

## 2021-01-01 RX ADMIN — EPINEPHRINE 1 MG: 1 INJECTION, SOLUTION, CONCENTRATE INTRAVENOUS at 15:18

## 2021-01-01 RX ADMIN — EPINEPHRINE 1 MG: 0.1 INJECTION, SOLUTION ENDOTRACHEAL; INTRACARDIAC; INTRAVENOUS at 14:59

## 2021-01-01 RX ADMIN — EPINEPHRINE 1 MG: 0.1 INJECTION, SOLUTION ENDOTRACHEAL; INTRACARDIAC; INTRAVENOUS at 15:04

## 2021-01-01 RX ADMIN — EPINEPHRINE 1 MG: 0.1 INJECTION, SOLUTION ENDOTRACHEAL; INTRACARDIAC; INTRAVENOUS at 15:12

## 2021-06-30 NOTE — ED PROVIDER NOTES
Patient presenting the emergency department as a cardiac arrest.  Had witnessed arrest at home, EMS briefly regained pulses, but lost pulses prior to arrival, she had received 4 rounds of epi, as well as a round of amiodarone, and 2 doses of atropine prior to arrival.  She was in PEA 7 minutes out and when she arrived in the emergency department. In the ED she had 5 rounds of CPR with 5 doses of epi, she also received 2 g of mag, and a gram of calcium as well as 1 amp of bicarb. Her Charma Hodgkins airway was exchanged and she was intubated using a 7.5, with bilateral breath sounds confirmed after placement. Patient briefly regained pulses after on 3 rounds of epi, obtain EKG, showed an inferior STEMI, lost pulses about 20 seconds later, 2 more rounds of CPR were performed, code was then called. Review of Systems   Unable to perform ROS: Acuity of condition        Physical Exam  Constitutional:       Appearance: She is ill-appearing and toxic-appearing. HENT:      Head: Normocephalic and atraumatic. Right Ear: External ear normal.      Left Ear: External ear normal.      Nose: Nose normal.      Mouth/Throat:      Mouth: Mucous membranes are dry. Eyes:      Conjunctiva/sclera: Conjunctivae normal.      Comments: No pupillary reaction or extraocular motion   Cardiovascular:      Comments: No pulses present  Pulmonary:      Comments: Bilateral breath sounds with bagging  Skin:     Coloration: Skin is pale. Comments: Dusky appearing          Procedures     MDM         Patient presented the emergency department as a cardiac arrest, perform multiple rounds of CPR, changed out Charma Hodgkins airway for intubation, found to have an inferior STEMI present on EKG, lost pulses after EKG obtained, tumor rounds of CPR performed, no pulses back, code was then called. EKG: This EKG is signed by emergency department physician.     Rate: 57  Rhythm: Sinus  Interpretation: ST elevation in leads II, III, aVF, reciprocal ST depressions in the anterior leads  Comparison: changes compared to previous EKG          --------------------------------------------- PAST HISTORY ---------------------------------------------  Past Medical History:  has a past medical history of Anxiety, Depression, and Osteoarthritis. Past Surgical History:  has a past surgical history that includes joint replacement; cyst removal; Tonsillectomy and adenoidectomy; Total hip arthroplasty (Right, 2015); cervical fusion (N/A, 2019); and cervical fusion (N/A, 7/3/2019). Social History:  reports that she has never smoked. She has never used smokeless tobacco. She reports that she does not drink alcohol and does not use drugs. Family History: family history is not on file. The patients home medications have been reviewed. Allergies: Sulfa antibiotics    -------------------------------------------------- RESULTS -------------------------------------------------  Labs:  No results found for this visit on 21. Radiology:  No orders to display       ------------------------- NURSING NOTES AND VITALS REVIEWED ---------------------------  Date / Time Roomed:  2021  3:00 PM  ED Bed Assignment:      The nursing notes within the ED encounter and vital signs as below have been reviewed. There were no vitals taken for this visit. Oxygen Saturation Interpretation: Abnormal          New Prescriptions    No medications on file       Diagnosis:  1.     2. Cardiac arrest [facility])        Disposition:  Patient's disposition: Discharge to   Patient's condition is .            Viviana Plascencia MD  Resident  [phone]

## 2021-06-30 NOTE — ED NOTES
Bed: 06  Expected date:   Expected time:   Means of arrival:   Comments:  EMS     Tae Peng, RN  06/30/21 1500

## 2021-07-01 LAB
EKG ATRIAL RATE: 18 BPM
EKG Q-T INTERVAL: 300 MS
EKG QRS DURATION: 90 MS
EKG QTC CALCULATION (BAZETT): 292 MS
EKG R AXIS: 118 DEGREES
EKG T AXIS: 125 DEGREES
EKG VENTRICULAR RATE: 57 BPM

## 2021-07-01 NOTE — ED NOTES
Per [facility], [address] [place] [address] Rd collected body.      Tian Pierre RN  16/66/74 [id number]

## 2021-07-01 NOTE — ED NOTES
[facility] notified that family requests their service, but that we will call them when [facility] has made decision     Ankur Lucas, RN  60/63/86 [phone]

## 2021-07-01 NOTE — ED NOTES
Spoke with [facility], at his time, they have not been able to reach the family, so still holding for [facility] decision     Zena Leong RN  77/09/99 0075

## 2021-07-01 NOTE — ED NOTES
[facility] notified this RN that they are no longer pursuing donation and body can be released to  home     Mariela Dent, RN  75/70/52 0493
